# Patient Record
Sex: FEMALE | Race: WHITE | NOT HISPANIC OR LATINO | Employment: OTHER | URBAN - METROPOLITAN AREA
[De-identification: names, ages, dates, MRNs, and addresses within clinical notes are randomized per-mention and may not be internally consistent; named-entity substitution may affect disease eponyms.]

---

## 2019-11-07 ENCOUNTER — TELEPHONE (OUTPATIENT)
Dept: FAMILY MEDICINE CLINIC | Facility: CLINIC | Age: 44
End: 2019-11-07

## 2019-11-07 ENCOUNTER — APPOINTMENT (OUTPATIENT)
Dept: LAB | Facility: HOSPITAL | Age: 44
End: 2019-11-07

## 2019-11-07 ENCOUNTER — OFFICE VISIT (OUTPATIENT)
Dept: BARIATRICS/WEIGHT MGMT | Facility: CLINIC | Age: 44
End: 2019-11-07

## 2019-11-07 VITALS
TEMPERATURE: 97.8 F | HEIGHT: 64 IN | BODY MASS INDEX: 46.1 KG/M2 | SYSTOLIC BLOOD PRESSURE: 124 MMHG | WEIGHT: 270 LBS | RESPIRATION RATE: 18 BRPM | DIASTOLIC BLOOD PRESSURE: 72 MMHG | HEART RATE: 87 BPM | OXYGEN SATURATION: 99 %

## 2019-11-07 DIAGNOSIS — R10.13 DYSPEPSIA: ICD-10-CM

## 2019-11-07 DIAGNOSIS — K21.9 GASTROESOPHAGEAL REFLUX DISEASE, ESOPHAGITIS PRESENCE NOT SPECIFIED: Primary | ICD-10-CM

## 2019-11-07 DIAGNOSIS — E66.9 DIABETES MELLITUS TYPE 2 IN OBESE (HCC): ICD-10-CM

## 2019-11-07 DIAGNOSIS — R06.09 DYSPNEA ON EXERTION: ICD-10-CM

## 2019-11-07 DIAGNOSIS — R11.0 POSTPRANDIAL NAUSEA: ICD-10-CM

## 2019-11-07 DIAGNOSIS — Z87.891 FORMER SMOKER: ICD-10-CM

## 2019-11-07 DIAGNOSIS — I10 HYPERTENSION, UNSPECIFIED TYPE: ICD-10-CM

## 2019-11-07 DIAGNOSIS — R53.83 FATIGUE, UNSPECIFIED TYPE: ICD-10-CM

## 2019-11-07 DIAGNOSIS — E66.01 MORBID OBESITY (HCC): ICD-10-CM

## 2019-11-07 DIAGNOSIS — E78.1 HYPERTRIGLYCERIDEMIA: ICD-10-CM

## 2019-11-07 DIAGNOSIS — E11.69 DIABETES MELLITUS TYPE 2 IN OBESE (HCC): ICD-10-CM

## 2019-11-07 PROCEDURE — 84443 ASSAY THYROID STIM HORMONE: CPT | Performed by: PHYSICIAN ASSISTANT

## 2019-11-07 PROCEDURE — 83036 HEMOGLOBIN GLYCOSYLATED A1C: CPT | Performed by: PHYSICIAN ASSISTANT

## 2019-11-07 PROCEDURE — 36415 COLL VENOUS BLD VENIPUNCTURE: CPT | Performed by: PHYSICIAN ASSISTANT

## 2019-11-07 PROCEDURE — 80053 COMPREHEN METABOLIC PANEL: CPT | Performed by: PHYSICIAN ASSISTANT

## 2019-11-07 PROCEDURE — 80061 LIPID PANEL: CPT | Performed by: PHYSICIAN ASSISTANT

## 2019-11-07 PROCEDURE — 85025 COMPLETE CBC W/AUTO DIFF WBC: CPT | Performed by: PHYSICIAN ASSISTANT

## 2019-11-07 PROCEDURE — 99205 OFFICE O/P NEW HI 60 MIN: CPT | Performed by: PHYSICIAN ASSISTANT

## 2019-11-07 RX ORDER — BACLOFEN 10 MG/1
10 TABLET ORAL NIGHTLY PRN
COMMUNITY
Start: 2019-10-25 | End: 2020-04-01

## 2019-11-07 RX ORDER — L.RHAMNOSUS/B.ANIMALIS(LACTIS) 3B CELL
1 CAPSULE ORAL DAILY
COMMUNITY
End: 2021-12-09

## 2019-11-07 RX ORDER — OMEPRAZOLE 20 MG/1
20 CAPSULE, DELAYED RELEASE ORAL 2 TIMES DAILY
Refills: 1 | COMMUNITY
Start: 2019-09-30 | End: 2020-02-27 | Stop reason: HOSPADM

## 2019-11-07 RX ORDER — ALBUTEROL SULFATE 90 UG/1
AEROSOL, METERED RESPIRATORY (INHALATION)
COMMUNITY
Start: 2019-10-15 | End: 2021-12-09

## 2019-11-07 RX ORDER — IBUPROFEN 200 MG
200 TABLET ORAL EVERY 6 HOURS PRN
COMMUNITY
End: 2020-02-27 | Stop reason: HOSPADM

## 2019-11-07 RX ORDER — INHALER, ASSIST DEVICES
SPACER (EA) MISCELLANEOUS
Refills: 0 | COMMUNITY
Start: 2019-10-19 | End: 2020-04-01

## 2019-11-07 RX ORDER — LISINOPRIL 10 MG/1
10 TABLET ORAL DAILY
COMMUNITY
Start: 2019-10-25

## 2019-11-07 RX ORDER — FENOFIBRIC ACID 135 MG/1
135 CAPSULE, DELAYED RELEASE ORAL DAILY
COMMUNITY
Start: 2019-10-25

## 2019-11-07 NOTE — PROGRESS NOTES
[facility] GROUP BARIATRIC SURGERY  [address]  [facility] [address]  [phone]      Patient  Name:  Eloise Delatorre  :  1975      Date of Visit: 2019      Chief Complaint:  weight gain; unable to maintain weight loss    History of Present Illness:  Eloise Delatorre is a 44 y.o. female who presents today for evaluation, education and consultation regarding weight loss surgery. The patient is interested in sleeve gastrectomy with Dr. Guzman.     Eloise has been overweight for at least 17 years, has been 35 pounds or more overweight for at least 10 years, has been 100 pounds or more overweight for 10 or more years and started dieting at age 32.  Previous diet attempts include: High Protein, Herbal Life, Low Carbohydrate, Low Fat, Calorie Counting, Cee's Diet, Cabbage Soup, Fasting and Slim Fast; Nutri-System and Weight Watchers; Wellbutrin, Phenteramine and Contrave.  The most weight Eloise lost was 15 pounds on WW but was unable to maintain that weight loss.  Her maximum lifetime weight is 270 pounds - current weight.    As above, patient has been overweight for many years, with numerous failed dietary/weight loss attempts.  She now has obesity related comorbidities and as such has decided to pursue weight loss surgery.  All past medical, surgical, social and family history have been obtained and discussed today, as pertinent to bariatric surgery.     Past Medical History:   Diagnosis Date   • Anxiety     no meds, previously on Zoloft, avoids d/t weight gain   • Asthma     allergy induced, prn inhalers   • Chronic back pain     Baclofen + Ibuprofen, denies steroid injections d/t weight gain   • Dyspepsia     w/ postprandial nausea + abd.pain   • Dyspnea on exertion    • Fatigue    • Fibromyalgia    • Former smoker     quit    • GERD (gastroesophageal reflux disease)     BID PPI, denies prior eval   • Hypertension    • Hypertriglyceridemia    • Iron deficiency     in  the past   • Morbid obesity (CMS/HCC)    • Psoriasis    • Sleep apnea     suspected, denies prior testing   • Type 2 diabetes mellitus (CMS/HCC)     never on insulin, does not check FSBS, A1C 11??   • Vitamin D deficiency      Past Surgical History:   Procedure Laterality Date   • BACK SURGERY  2012    L4-L5   • BLADDER SUSPENSION  2009    during hysterectomy   • COLONOSCOPY  2015    unremarkable, but w/ hx polyps   • DIAGNOSTIC LAPAROSCOPY  1994    x 8 ([id number]) for endometriosis   • TOTAL LAPAROSCOPIC HYSTERECTOMY  2009    for endometriosis   • WISDOM TOOTH EXTRACTION  1992       No Known Allergies    Current Outpatient Medications:   •  albuterol sulfate  (90 Base) MCG/ACT inhaler, , Disp: , Rfl:   •  baclofen (LIORESAL) 10 MG tablet, Take 10 mg by mouth At Night As Needed., Disp: , Rfl:   •  Cholecalciferol (VITAMIN D3) 125 MCG (5000 UT) capsule capsule, Take 5,000 Units by mouth Daily., Disp: , Rfl:   •  fenofibric acid (TRILIPIX) 135 MG capsule delayed-release delayed release capsule, , Disp: , Rfl:   •  ibuprofen (ADVIL,MOTRIN) 200 MG tablet, Take 200 mg by mouth Every 6 (Six) Hours As Needed for Mild Pain ., Disp: , Rfl:   •  lisinopril (PRINIVIL,ZESTRIL) 10 MG tablet, Take 10 mg by mouth Daily., Disp: , Rfl:   •  Loratadine-Pseudoephedrine (CLARITIN-D 12 HOUR PO), Take  by mouth 2 (Two) Times a Day., Disp: , Rfl:   •  metFORMIN (GLUCOPHAGE) 500 MG tablet, TAKE 1 TABLET BY MOUTH EVERY DAY IN THE MORNING WITH BREAKFAST, Disp: , Rfl: 1  •  omeprazole (priLOSEC) 20 MG capsule, Take 20 mg by mouth 2 (Two) Times a Day., Disp: , Rfl: 1  •  Probiotic Product ([employer]) capsule, Take  by mouth., Disp: , Rfl:   •  Spacer/Aero-Holding Chambers (OPTICJohn [facility]. [facility] Children's Hospital[facility] RICCARDO) misc, 1 EACH BY MISC.(NON-DRUG COMBO ROUTE) ROUTE 4 TIMES DAILY., Disp: , Rfl: 0    Social History     Socioeconomic History   • Marital status:      Spouse name: Not on file   • Number of children: Not on file   • Years of  education: Not on file   • Highest education level: Not on file   Tobacco Use   • Smoking status: Former Smoker     Packs/day: 3.00     Years: 20.00     Pack years: 60.00     Last attempt to quit: 2009     Years since quitting: 10.8   • Smokeless tobacco: Never Used   Substance and Sexual Activity   • Alcohol use: No     Frequency: Never   • Drug use: No   Social History Narrative    Living in [place], [place] w/  and daughter.  On disability since 2/2019 for back pain.  Formerly worked w/ [employer] x 17 years.     Family History   Problem Relation Age of Onset   • Obesity Mother    • Hypertension Mother    • Diabetes Mother    • Obesity Father    • Hypertension Father    • Heart attack Father    • Aneurysm Father    • Obesity Maternal Grandmother    • Diabetes Maternal Grandmother    • Hypertension Maternal Grandmother    • Colon cancer Maternal Grandfather 87   • Obesity Maternal Grandfather    • Hypertension Maternal Grandfather    • Obesity Paternal Grandmother    • Hypertension Paternal Grandmother    • Obesity Paternal Grandfather    • Hypertension Paternal Grandfather        Review of Systems:  Constitutional:  reports fatigue, weight gain and denies fevers, chills.  HEENT:  denies headache, ear pain or loss of hearing, blurred or double vision, nasal discharge or sore throat.  Cardiovascular:  reports HTN, HLD and denies hx heart disease, hx MI, chest pain, palpitations, hx DVT.  Respiratory:  reports asthma, suspected sleep apnea and denies cough , wheezing, hx PE.  Gastrointestinal:  reports dysphagia, heartburn, nausea, abdominal pain, and denies liver disease.  Genitourinary:  denies history of  frequent UTI, incontinence, hematuria, dysuria, polyuria, renal insufficiency.    Musculoskeletal:  denies autoimmune disease.  Neurological:  denies migraines, numbness /tingling, dizziness, confusion, seizure.  Psychiatric:  reports anxiety, depression and denies bipolar disorder.  Endocrine:  denies  thyroid disease, gout.  Hematologic:  denies bleeding disorder, hx blood transfusion.  Skin:  denies rashes, hx MRSA.    Physical Exam:  Vital Signs:  Weight: 122 kg (270 lb)   Body mass index is 47.08 kg/m².  Temp: 97.8 °F (36.6 °C)   Heart Rate: 87   BP: 124/72     Physical Exam   Constitutional: She is oriented to person, place, and time. She appears well-developed and well-nourished.   HENT:   Head: Normocephalic and atraumatic.   Eyes: Conjunctivae are normal. No scleral icterus.   Neck: Neck supple. No thyromegaly present.   Cardiovascular: Normal rate and regular rhythm.   No murmur heard.  Pulmonary/Chest: Effort normal and breath sounds normal. No respiratory distress. She has no wheezes. She has no rales.   Abdominal: Soft. Bowel sounds are normal. She exhibits no mass. There is tenderness (RUQ). There is no rebound and no guarding. No hernia.   scars: lower lap incisions (KENDRA/BSO), periumbilical   Musculoskeletal: Normal range of motion. She exhibits no edema.   Neurological: She is alert and oriented to person, place, and time. Gait normal.   Skin: Skin is warm and dry. No rash noted.   Psychiatric: She has a normal mood and affect. Judgment normal.   Vitals reviewed.      Patient Active Problem List   Diagnosis   • Fatigue   • Dyspepsia   • Dyspnea on exertion   • Morbid obesity (CMS/HCC)   • Hypertension   • GERD (gastroesophageal reflux disease)   • Type 2 diabetes mellitus (CMS/HCC)   • Hypertriglyceridemia   • Chronic back pain   • Vitamin D deficiency   • Asthma   • Iron deficiency   • Former smoker   • Fibromyalgia   • Sleep apnea   • Anxiety   • Psoriasis       Assessment:    Eloise Delatorre is a 44 y.o. female with medically complicated obesity pursuing sleeve gastrectomy.    Weight loss surgery is deemed medically necessary given the following obesity related comorbidities including diabetes, hypertension, dyslipidemia, back pain, GERD and asthma with current Weight: 122 kg (270 lb) and Body mass  index is 47.08 kg/m².    Plan:  Patient understands that bariatric surgery is not cosmetic surgery but rather a tool to help make a lifelong commitment to lifestyle changes including diet, exercise, behavior modifications, and healthy habits.  The patient has been educated today on those expected postoperative lifestyle changes.  The surgical procedure was discussed and all questions were answered.  Instructions on how to access [employer] (an internet based site w/ educational surgical videos) were given to the patient.  Recommended vitamin supplementation was reviewed.  The importance of avoiding ASA/ NSAIDS/ steroids/ tobacco/ hormones/ immunomodulators perioperatively was discussed in detail.  Psychological and Nutritional evaluations will be arranged prior to surgery.  The patient was advised to start on a high protein and low carbohydrate diet in preparation for surgery.      Preop testing will include: CBC, CMP, Lipids, TSH, HgA1C, H.Pylori stool, Pulmonary Function Testing, CXR, EKG, Gallbladder Eval and EGD.    The risks and benefits of the upper endoscopy were discussed with the patient in detail and all questions were answered.  Possibility of perforation, bleeding, aspiration, and anesthesia reaction were reviewed.  Patient agrees to proceed.    Additional preop clearances required prior to surgery: Cardiac.      Patient is an acceptable candidate for surgery pending the above results and final consultation w/ Dr. Guzman.    AIMEE Garcia        MDM: New problem w/ further workup planned.  Labs, imaging, additional testing planned, old records (op notes) obtained /reviewed, all to be discussed further w/ surgeon.  HIGH complexity.

## 2019-11-08 LAB
ALBUMIN SERPL-MCNC: 4 G/DL (ref 3.5–5.2)
ALBUMIN/GLOB SERPL: 1.5 G/DL
ALP SERPL-CCNC: 87 U/L (ref 39–117)
ALT SERPL W P-5'-P-CCNC: 26 U/L (ref 1–33)
ANION GAP SERPL CALCULATED.3IONS-SCNC: 10.8 MMOL/L (ref 5–15)
AST SERPL-CCNC: 15 U/L (ref 1–32)
BASOPHILS # BLD AUTO: 0.05 10*3/MM3 (ref 0–0.2)
BASOPHILS NFR BLD AUTO: 0.5 % (ref 0–1.5)
BILIRUB SERPL-MCNC: <0.2 MG/DL (ref 0.2–1.2)
BUN BLD-MCNC: 15 MG/DL (ref 6–20)
BUN/CREAT SERPL: 22.4 (ref 7–25)
CALCIUM SPEC-SCNC: 9.1 MG/DL (ref 8.6–10.5)
CHLORIDE SERPL-SCNC: 107 MMOL/L (ref 98–107)
CHOLEST SERPL-MCNC: 165 MG/DL (ref 0–200)
CO2 SERPL-SCNC: 26.2 MMOL/L (ref 22–29)
CREAT BLD-MCNC: 0.67 MG/DL (ref 0.57–1)
DEPRECATED RDW RBC AUTO: 45.7 FL (ref 37–54)
EOSINOPHIL # BLD AUTO: 0.15 10*3/MM3 (ref 0–0.4)
EOSINOPHIL NFR BLD AUTO: 1.4 % (ref 0.3–6.2)
ERYTHROCYTE [DISTWIDTH] IN BLOOD BY AUTOMATED COUNT: 17.1 % (ref 12.3–15.4)
GFR SERPL CREATININE-BSD FRML MDRD: 95 ML/MIN/1.73
GLOBULIN UR ELPH-MCNC: 2.6 GM/DL
GLUCOSE BLD-MCNC: 167 MG/DL (ref 65–99)
HBA1C MFR BLD: 6.7 % (ref 4.8–5.6)
HCT VFR BLD AUTO: 36.2 % (ref 34–46.6)
HDLC SERPL-MCNC: 38 MG/DL (ref 40–60)
HGB BLD-MCNC: 10.7 G/DL (ref 12–15.9)
IMM GRANULOCYTES # BLD AUTO: 0.18 10*3/MM3 (ref 0–0.05)
IMM GRANULOCYTES NFR BLD AUTO: 1.7 % (ref 0–0.5)
LDLC SERPL CALC-MCNC: 66 MG/DL (ref 0–100)
LDLC/HDLC SERPL: 1.74 {RATIO}
LYMPHOCYTES # BLD AUTO: 2.35 10*3/MM3 (ref 0.7–3.1)
LYMPHOCYTES NFR BLD AUTO: 22.3 % (ref 19.6–45.3)
MCH RBC QN AUTO: 22.3 PG (ref 26.6–33)
MCHC RBC AUTO-ENTMCNC: 29.6 G/DL (ref 31.5–35.7)
MCV RBC AUTO: 75.6 FL (ref 79–97)
MONOCYTES # BLD AUTO: 0.95 10*3/MM3 (ref 0.1–0.9)
MONOCYTES NFR BLD AUTO: 9 % (ref 5–12)
NEUTROPHILS # BLD AUTO: 6.85 10*3/MM3 (ref 1.7–7)
NEUTROPHILS NFR BLD AUTO: 65.1 % (ref 42.7–76)
NRBC BLD AUTO-RTO: 0 /100 WBC (ref 0–0.2)
PLATELET # BLD AUTO: 302 10*3/MM3 (ref 140–450)
PMV BLD AUTO: 13.3 FL (ref 6–12)
POTASSIUM BLD-SCNC: 4.5 MMOL/L (ref 3.5–5.2)
PROT SERPL-MCNC: 6.6 G/DL (ref 6–8.5)
RBC # BLD AUTO: 4.79 10*6/MM3 (ref 3.77–5.28)
SODIUM BLD-SCNC: 144 MMOL/L (ref 136–145)
TRIGL SERPL-MCNC: 304 MG/DL (ref 0–150)
TSH SERPL DL<=0.05 MIU/L-ACNC: 1.11 UIU/ML (ref 0.27–4.2)
VLDLC SERPL-MCNC: 60.8 MG/DL (ref 5–40)
WBC NRBC COR # BLD: 10.53 10*3/MM3 (ref 3.4–10.8)

## 2019-11-12 ENCOUNTER — DOCUMENTATION (OUTPATIENT)
Dept: BARIATRICS/WEIGHT MGMT | Facility: CLINIC | Age: 44
End: 2019-11-12

## 2019-11-12 NOTE — PROGRESS NOTES
"Weight Loss Surgery  Presurgical Nutrition Assessment     Eloise Delatorre  11/12/2019 ( Nutrition consultation /c dietitian on 11/07/2019)  [id number]  [id number]  1975  female    Surgery desired: Sleeve Gastrectomy    Ht 161.3 cm (63.5\"); Wt 122 kg (270 #); BMI 47.1  Past Medical History:   Diagnosis Date   • Anxiety     no meds, previously on Zoloft, avoids d/t weight gain   • Asthma     allergy induced, prn inhalers   • Chronic back pain     Baclofen + Ibuprofen, denies steroid injections d/t weight gain   • Dyspepsia     w/ postprandial nausea + abd.pain   • Dyspnea on exertion    • Fatigue    • Fibromyalgia    • Former smoker     quit 2009   • GERD (gastroesophageal reflux disease)     BID PPI, denies prior eval   • Hypertension    • Hypertriglyceridemia    • Iron deficiency     in the past   • Morbid obesity (CMS/HCC)    • Psoriasis    • Sleep apnea     suspected, denies prior testing   • Type 2 diabetes mellitus (CMS/HCC)     never on insulin, does not check FSBS, A1C 11??   • Vitamin D deficiency      Past Surgical History:   Procedure Laterality Date   • BACK SURGERY  2012    L4-L5   • BLADDER SUSPENSION  2009    during hysterectomy   • COLONOSCOPY  2015    unremarkable, but w/ hx polyps   • DIAGNOSTIC LAPAROSCOPY  1994    x 8 ([id number]) for endometriosis   • TOTAL LAPAROSCOPIC HYSTERECTOMY  2009    for endometriosis   • WISDOM TOOTH EXTRACTION  1992     No Known Allergies    Current Outpatient Medications:   •  albuterol sulfate  (90 Base) MCG/ACT inhaler, , Disp: , Rfl:   •  baclofen (LIORESAL) 10 MG tablet, Take 10 mg by mouth At Night As Needed., Disp: , Rfl:   •  Cholecalciferol (VITAMIN D3) 125 MCG (5000 UT) capsule capsule, Take 5,000 Units by mouth Daily., Disp: , Rfl:   •  fenofibric acid (TRILIPIX) 135 MG capsule delayed-release delayed release capsule, , Disp: , Rfl:   •  ibuprofen (ADVIL,MOTRIN) 200 MG tablet, Take 200 mg by mouth Every 6 (Six) Hours As Needed for Mild Pain ., Disp: " , Rfl:   •  lisinopril (PRINIVIL,ZESTRIL) 10 MG tablet, Take 10 mg by mouth Daily., Disp: , Rfl:   •  Loratadine-Pseudoephedrine (CLARITIN-D 12 HOUR PO), Take  by mouth 2 (Two) Times a Day., Disp: , Rfl:   •  metFORMIN (GLUCOPHAGE) 500 MG tablet, TAKE 1 TABLET BY MOUTH EVERY DAY IN THE MORNING WITH BREAKFAST, Disp: , Rfl: 1  •  omeprazole (priLOSEC) 20 MG capsule, Take 20 mg by mouth 2 (Two) Times a Day., Disp: , Rfl: 1  •  Probiotic Product ([employer]) capsule, Take  by mouth., Disp: , Rfl:   •  Spacer/Aero-Holding Chambers (OPTICSt[facility] HospitalAventones) misc, 1 EACH BY MISC.(NON-DRUG COMBO ROUTE) ROUTE 4 TIMES DAILY., Disp: , Rfl: 0      Nutrition Assessment    Estimated energy needs:  1840 kcal    Estimated calories for weight loss:  1400 kcal    IBW (Pounds):  145 #        Excess body weight (Pounds):  125 #       Nutrition Recall  24 Hour recall: (B) (L) (D) -  Reviewed and discussed with patient.  Drinks water primarily - no soda or sweet tea.  Brkfast @ 8 am = 2 1/2 c salad:  Lettuce, 1/4 c shredded cheese, 20 jumbo shrimp /c no sauce & blue cheese dressing.  Dinner @ 4:30 pm = 6 slices of [employer] supreme pizza, ea piece /c ~ 1 oz cheese + 1 oz meat.  Snack @ 8 pm = reg sz Brandy bar /c almonds.  States that serving sizes & meal skipping are her primary food-related issues.  Diet adeq in total protein which is consumed in only 2 feedings. Pt to focus on ingesting adeq but not excessive prot for wt mgt in 3 meals + 2-3 snks qd.       Exercise  never      Education    Provided information packet re:  Sleeve Gastrectomy  1. Reviewed guidelines for higher protein, limited carbohydrate diet to promote weight loss.  Encouraged patient to incorporate these principles of healthy eating from now until approximately 2 weeks prior to bariatric surgery date, when an even lower carbohydrate “liver-shrinking” regimen will be followed. (Information sheet re pre-op diet given).  Explained that after recovery from  surgery this diet will again be followed to ensure further loss and for weight maintenance.    2. Encouraged patient to choose an acceptable protein supplement powder or shake for post-surgery liquid diet.  Provided product guidelines and examples.    3. Explained importance of goal setting to help in changing eating behaviors that are not conducive to weight loss.  Targeted several on a worksheet which also included spaces for patient to work on issues specific to them.  4. Provided follow-up options for support, including contact information for dietitians here, if desired.  Web-based support information and apps for smart phones and computers given.  Noted that monthly support group is offered at this clinic, and that support is associated with successful weight loss.    Recommend that team proceed with surgery and follow per protocol.      Nutrition Goals   Dietary Guidelines per information packet as described above  Protein goal:  grams per day   Carbohydrate goal:  100-140 grams per day  Eliminate soda, sweet tea, etc.     Exercise Goals  Continue current exercise routine   Add 15-30 minutes of activity per day as tolerated      Nicole Gonzalez RD  11/12/2019  10:05 AM

## 2019-11-19 ENCOUNTER — HOSPITAL ENCOUNTER (OUTPATIENT)
Dept: PULMONOLOGY | Facility: HOSPITAL | Age: 44
Discharge: HOME | End: 2019-11-19

## 2019-11-19 ENCOUNTER — LAB (OUTPATIENT)
Dept: LAB | Facility: HOSPITAL | Age: 44
End: 2019-11-19

## 2019-11-19 ENCOUNTER — HOSPITAL ENCOUNTER (OUTPATIENT)
Dept: ULTRASOUND IMAGING | Facility: HOSPITAL | Age: 44
Discharge: HOME | End: 2019-11-19
Admitting: PHYSICIAN ASSISTANT

## 2019-11-19 DIAGNOSIS — Z87.891 FORMER SMOKER: ICD-10-CM

## 2019-11-19 DIAGNOSIS — K21.9 GASTROESOPHAGEAL REFLUX DISEASE, ESOPHAGITIS PRESENCE NOT SPECIFIED: ICD-10-CM

## 2019-11-19 DIAGNOSIS — R10.13 DYSPEPSIA: ICD-10-CM

## 2019-11-19 DIAGNOSIS — R06.09 DYSPNEA ON EXERTION: ICD-10-CM

## 2019-11-19 DIAGNOSIS — E66.01 MORBID OBESITY (HCC): ICD-10-CM

## 2019-11-19 PROCEDURE — 94729 DIFFUSING CAPACITY: CPT

## 2019-11-19 PROCEDURE — 94727 GAS DIL/WSHOT DETER LNG VOL: CPT

## 2019-11-19 PROCEDURE — 94729 DIFFUSING CAPACITY: CPT | Performed by: INTERNAL MEDICINE

## 2019-11-19 PROCEDURE — 94010 BREATHING CAPACITY TEST: CPT

## 2019-11-19 PROCEDURE — 94010 BREATHING CAPACITY TEST: CPT | Performed by: INTERNAL MEDICINE

## 2019-11-19 PROCEDURE — 87338 HPYLORI STOOL AG IA: CPT

## 2019-11-19 PROCEDURE — 94727 GAS DIL/WSHOT DETER LNG VOL: CPT | Performed by: INTERNAL MEDICINE

## 2019-11-19 PROCEDURE — 76705 ECHO EXAM OF ABDOMEN: CPT

## 2019-11-22 LAB — H PYLORI AG STL QL IA: NEGATIVE

## 2019-11-27 ENCOUNTER — LAB REQUISITION (OUTPATIENT)
Dept: LAB | Facility: HOSPITAL | Age: 44
End: 2019-11-27

## 2019-11-27 DIAGNOSIS — K21.9 GASTRO-ESOPHAGEAL REFLUX DISEASE WITHOUT ESOPHAGITIS: ICD-10-CM

## 2019-11-27 PROCEDURE — 88305 TISSUE EXAM BY PATHOLOGIST: CPT | Performed by: SURGERY

## 2019-12-02 LAB
CYTO UR: NORMAL
LAB AP CASE REPORT: NORMAL
LAB AP CLINICAL INFORMATION: NORMAL
PATH REPORT.FINAL DX SPEC: NORMAL
PATH REPORT.GROSS SPEC: NORMAL

## 2019-12-10 ENCOUNTER — CONSULT (OUTPATIENT)
Dept: CARDIOLOGY | Facility: CLINIC | Age: 44
End: 2019-12-10

## 2019-12-10 VITALS
SYSTOLIC BLOOD PRESSURE: 118 MMHG | HEART RATE: 86 BPM | WEIGHT: 278 LBS | HEIGHT: 64 IN | DIASTOLIC BLOOD PRESSURE: 72 MMHG | BODY MASS INDEX: 47.46 KG/M2

## 2019-12-10 DIAGNOSIS — Z01.810 PREOPERATIVE CARDIOVASCULAR EXAMINATION: Primary | ICD-10-CM

## 2019-12-10 PROCEDURE — 99203 OFFICE O/P NEW LOW 30 MIN: CPT | Performed by: INTERNAL MEDICINE

## 2019-12-10 PROCEDURE — 93000 ELECTROCARDIOGRAM COMPLETE: CPT | Performed by: INTERNAL MEDICINE

## 2019-12-10 RX ORDER — MULTIVITAMIN/IRON/FOLIC ACID 18MG-0.4MG
1 TABLET ORAL DAILY
COMMUNITY
Start: 2019-12-01

## 2019-12-10 NOTE — PROGRESS NOTES
[facility]  Cardiology Consultation Note     Eloise Delatorre  1975  Requesting Provider: AIMEE Garcia  PCP: Onofre Butler MD    ID:  Eloise Delatorre is a 44 y.o. female seen for a consultation visit regarding the following:     Chief Complaint   Patient presents with   • Shortness of Breath   • Edema   • Surgical Clearance             The patient is a 44 y.o. female who is referred to my office by Latanya Jo to obtain cardiac clearance for bariatric surgery. She has a history of hypertension, morbid obesity, and diabetes mellitus.  The patient denies any exertional chest discomfort to suggest angina.  She does experience shortness of breath with activities which is been present since she has been obese.  She does suffer from low back pain which limits some of the activities that she can do.  However, she does participate in warm water aerobics 5 days a week without chest discomfort.  She is able to walk up a flight of stairs but typically chooses not to do so due to back pain.  She denies any family history of precocious coronary artery disease.      Past Medical History, Past Surgical History, Family history, Social History, and Medications were all reviewed with the patient today and updated as necessary.       Current Outpatient Medications:   •  albuterol sulfate  (90 Base) MCG/ACT inhaler, , Disp: , Rfl:   •  baclofen (LIORESAL) 10 MG tablet, Take 10 mg by mouth At Night As Needed., Disp: , Rfl:   •  Cholecalciferol (VITAMIN D3) 125 MCG (5000 UT) capsule capsule, Take 5,000 Units by mouth Daily., Disp: , Rfl:   •  fenofibric acid (TRILIPIX) 135 MG capsule delayed-release delayed release capsule, , Disp: , Rfl:   •  ibuprofen (ADVIL,MOTRIN) 200 MG tablet, Take 200 mg by mouth Every 6 (Six) Hours As Needed for Mild Pain ., Disp: , Rfl:   •  lisinopril (PRINIVIL,ZESTRIL) 10 MG tablet, Take 10 mg by mouth Daily., Disp: , Rfl:   •   Loratadine-Pseudoephedrine (CLARITIN-D 12 HOUR PO), Take  by mouth 2 (Two) Times a Day., Disp: , Rfl:   •  metFORMIN (GLUCOPHAGE) 500 MG tablet, TAKE 1 TABLET BY MOUTH EVERY DAY IN THE MORNING WITH BREAKFAST, Disp: , Rfl: 1  •  Multiple Vitamins-Minerals (C[employer]UM [employer] WOMENS) tablet, Take 1 tablet by mouth Daily., Disp: , Rfl:   •  omeprazole (priLOSEC) 20 MG capsule, Take 20 mg by mouth 2 (Two) Times a Day., Disp: , Rfl: 1  •  Probiotic Product ([employer]) capsule, Take  by mouth., Disp: , Rfl:   •  Spacer/Aero-Holding Chambers (OPTICUp[facility] Community CampusBER RICCARDO) misc, 1 EACH BY MISC.(NON-DRUG COMBO ROUTE) ROUTE 4 TIMES DAILY., Disp: , Rfl: 0    No Known Allergies      Past Medical History:   Diagnosis Date   • Anxiety     no meds, previously on Zoloft, avoids d/t weight gain   • Asthma     allergy induced, prn inhalers   • Chronic back pain     Baclofen + Ibuprofen, denies steroid injections d/t weight gain   • Dyspepsia     w/ postprandial nausea + abd.pain   • Dyspnea on exertion    • Fatigue    • Fibromyalgia    • Former smoker     quit 2009   • GERD (gastroesophageal reflux disease)     BID PPI, denies prior eval   • Hypertension    • Hypertriglyceridemia    • Iron deficiency     in the past   • Morbid obesity (CMS/HCC)    • Psoriasis    • Sleep apnea     suspected, denies prior testing   • Type 2 diabetes mellitus (CMS/HCC)     never on insulin, does not check FSBS, A1C 11??   • Vitamin D deficiency      Past Surgical History:   Procedure Laterality Date   • BACK SURGERY  2012    L4-L5   • BLADDER SUSPENSION  2009    during hysterectomy   • CAROTID STENT     • COLONOSCOPY  2015    unremarkable, but w/ hx polyps   • DIAGNOSTIC LAPAROSCOPY  1994    x 8 ([id number]) for endometriosis   • TOTAL LAPAROSCOPIC HYSTERECTOMY  2009    for endometriosis   • WISDOM TOOTH EXTRACTION  1992     Family History   Problem Relation Age of Onset   • Obesity Mother    • Hypertension Mother    • Diabetes Mother    • Obesity Father   "  • Hypertension Father    • Heart attack Father    • Aneurysm Father    • Obesity Maternal Grandmother    • Diabetes Maternal Grandmother    • Hypertension Maternal Grandmother    • Colon cancer Maternal Grandfather 87   • Obesity Maternal Grandfather    • Hypertension Maternal Grandfather    • Obesity Paternal Grandmother    • Hypertension Paternal Grandmother    • Obesity Paternal Grandfather    • Hypertension Paternal Grandfather      Social History     Tobacco Use   • Smoking status: Former Smoker     Packs/day: 3.00     Years: 20.00     Pack years: 60.00     Last attempt to quit: 2009     Years since quitting: 10.9   • Smokeless tobacco: Never Used   Substance Use Topics   • Alcohol use: No     Frequency: Never       Review of Systems   Constitution: Negative for fever and malaise/fatigue.   HENT: Positive for ear pain and sore throat.    Eyes: Negative for vision loss in left eye and vision loss in right eye.   Cardiovascular: Positive for chest pain, claudication, leg swelling and orthopnea. Negative for dyspnea on exertion, near-syncope, palpitations, paroxysmal nocturnal dyspnea and syncope.   Respiratory: Positive for wheezing. Negative for hemoptysis and sputum production.    Skin: Negative for rash.   Musculoskeletal: Positive for neck pain. Negative for myalgias.   Gastrointestinal: Positive for abdominal pain. Negative for vomiting.   Neurological: Positive for headaches. Negative for brief paralysis, excessive daytime sleepiness, focal weakness, numbness, paresthesias and weakness.   All other systems reviewed and are negative.              /72 (BP Location: Right arm, Patient Position: Sitting)   Pulse 86   Ht 161.3 cm (63.5\")   Wt 126 kg (278 lb)   BMI 48.47 kg/m²        Physical Exam   Constitutional: She is oriented to person, place, and time. She appears well-developed and well-nourished.   HENT:   Head: Normocephalic and atraumatic.   Eyes: Pupils are equal, round, and reactive to " light. No scleral icterus.   Neck: No JVD present. Carotid bruit is not present. No thyromegaly present.   Cardiovascular: Normal rate and regular rhythm. Exam reveals no gallop.   No murmur heard.  Pulmonary/Chest: Effort normal and breath sounds normal.   Abdominal: Soft. She exhibits no distension. There is no hepatosplenomegaly.   Musculoskeletal: She exhibits no edema.   Neurological: She is alert and oriented to person, place, and time.   Skin: Skin is warm and dry.   Psychiatric: She has a normal mood and affect. Her behavior is normal.           ECG 12 Lead  Date/Time: 12/10/2019 9:31 AM  Performed by: Maxwell Wills IV, MD  Authorized by: Maxwell Wills IV, MD   Rhythm: sinus rhythm  BPM: 86  Other findings: poor R wave progression    Clinical impression: non-specific ECG            Lab Results   Component Value Date    GLUCOSE 167 (H) 11/07/2019    BUN 15 11/07/2019    CREATININE 0.67 11/07/2019    EGFRIFNONA 95 11/07/2019    BCR 22.4 11/07/2019    K 4.5 11/07/2019    CO2 26.2 11/07/2019    CALCIUM 9.1 11/07/2019    ALBUMIN 4.00 11/07/2019    AST 15 11/07/2019    ALT 26 11/07/2019      Lab Results   Component Value Date    CHOL 165 11/07/2019    TRIG 304 (H) 11/07/2019    HDL 38 (L) 11/07/2019    LDL 66 11/07/2019      Lab Results   Component Value Date    HGBA1C 6.70 (H) 11/07/2019           Problem List Items Addressed This Visit     Preoperative cardiovascular examination - Primary    Overview     · EKG (12/10/2019): Sinus rhythm with poor R wave progression due to lead placed         Current Assessment & Plan     · Patient has acceptable exercise tolerance without anginal or heart failure symptoms  · Patient is at low risk of obstructive CAD based on age and lack of anginal symptoms  · EKG today shows poor R wave progression related to lead placement  · No indication for further cardiac testing at this time  · Proceed with surgery with acceptable cardiovascular risk                       · Proceed with bariatric surgery with acceptable cardiovascular risk  · Please call if any cardiac complications arise during hospitalization            LEANNE Wills MD FACC, [facility] [place] [facility]  Interventional and General Cardiology

## 2019-12-10 NOTE — ASSESSMENT & PLAN NOTE
· Patient has acceptable exercise tolerance without anginal or heart failure symptoms  · Patient is at low risk of obstructive CAD based on age and lack of anginal symptoms  · EKG today shows poor R wave progression related to lead placement  · No indication for further cardiac testing at this time  · Proceed with surgery with acceptable cardiovascular risk

## 2019-12-24 ENCOUNTER — HOSPITAL ENCOUNTER (OUTPATIENT)
Dept: NUCLEAR MEDICINE | Facility: HOSPITAL | Age: 44
Discharge: HOME | End: 2019-12-24

## 2019-12-24 VITALS — WEIGHT: 261 LBS | BODY MASS INDEX: 45.51 KG/M2

## 2019-12-24 DIAGNOSIS — R11.0 POSTPRANDIAL NAUSEA: ICD-10-CM

## 2019-12-24 DIAGNOSIS — R10.13 DYSPEPSIA: ICD-10-CM

## 2019-12-24 PROCEDURE — 0 TECHNETIUM TC 99M MEBROFENIN KIT: Performed by: PHYSICIAN ASSISTANT

## 2019-12-24 PROCEDURE — A9537 TC99M MEBROFENIN: HCPCS | Performed by: PHYSICIAN ASSISTANT

## 2019-12-24 PROCEDURE — 78227 HEPATOBIL SYST IMAGE W/DRUG: CPT

## 2019-12-24 PROCEDURE — 25010000002 SINCALIDE PER 5 MCG: Performed by: PHYSICIAN ASSISTANT

## 2019-12-24 RX ORDER — KIT FOR THE PREPARATION OF TECHNETIUM TC 99M MEBROFENIN 45 MG/10ML
1 INJECTION, POWDER, LYOPHILIZED, FOR SOLUTION INTRAVENOUS
Status: COMPLETED | OUTPATIENT
Start: 2019-12-24 | End: 2019-12-24

## 2019-12-24 RX ADMIN — SINCALIDE 2.4 MCG: 5 INJECTION, POWDER, LYOPHILIZED, FOR SOLUTION INTRAVENOUS at 10:25

## 2019-12-24 RX ADMIN — MEBROFENIN 1 DOSE: 45 INJECTION, POWDER, LYOPHILIZED, FOR SOLUTION INTRAVENOUS at 09:34

## 2020-01-27 DIAGNOSIS — R06.00 DYSPNEA, UNSPECIFIED TYPE: Primary | ICD-10-CM

## 2020-01-27 DIAGNOSIS — R53.83 FATIGUE, UNSPECIFIED TYPE: ICD-10-CM

## 2020-01-29 ENCOUNTER — HOSPITAL ENCOUNTER (OUTPATIENT)
Dept: GENERAL RADIOLOGY | Facility: HOSPITAL | Age: 45
Discharge: HOME | End: 2020-01-29
Admitting: PHYSICIAN ASSISTANT

## 2020-01-29 DIAGNOSIS — R53.83 FATIGUE, UNSPECIFIED TYPE: ICD-10-CM

## 2020-01-29 DIAGNOSIS — R06.00 DYSPNEA, UNSPECIFIED TYPE: ICD-10-CM

## 2020-01-29 PROCEDURE — 71046 X-RAY EXAM CHEST 2 VIEWS: CPT

## 2020-02-01 NOTE — PROGRESS NOTES
"[facility] BARIATRIC SURGERY  [address]  [address]  [facility] [id number] [phone]      Patient  Name:  Eloise Delatorre  :  1975      Date of Visit: 2/3/2020      Chief Complaint:  weight gain; unable to maintain weight loss    History of Present Illness:  Eloise Delatorre is a 44 y.o. female who presents today for evaluation, education and consultation regarding weight loss surgery.     Patient has been overweight for many years, with numerous failed dietary/weight loss attempts.  She now has obesity related comorbidities of HTN, HLD, asthma, KYLER, GERD, DM2, chronic back pain and as such has decided to pursue weight loss surgery.    Last A1C 6.7.  Intake trigs 300+.    She saw her labs results on Mychart, and she saw hypochromia on her RBCs, looked it up on internet, started taking iron.  She thinks it has increased her energy.    Has started metformin a few months ago for prediabetes.    No other changes to medical history.      No personal or family hx of VTE or clotting d/o.  No liver, lung, heart, or renal disease      Review of data:    TEO: tramadol  CBC: nl  CMP: glc 251, CO2 18.7  EGD: 19 PQ patulous hiatus/possible HH, DE reflux  HP negative  Cardiac clearance:\"acceptable cardiac risk\"  PFTs: \"No signs of obstruction.  Moderate restriction based on a decreased total lung capacity.  With a severely reduced DLCO.\"  She uses PRN inhalers, has dypsnea on exertion at baseline.  Carries dx of allergy-induced asthma.    EKG: NSR  CXR: nl    Gallbladder eval: GBUS showed fatty liver.  HIDA EF 48% with reproduction of symptoms of mild nausea lasting only a few seconds.    Last tobacco: 10 years ago  Last NSAIDs: last week ibuprofen  Last ASA: n/a  Last steroids: remote  Last hormones: n/a      Past Medical History:   Diagnosis Date   • Anxiety     no meds, previously on Zoloft, avoids d/t weight gain   • Asthma     allergy induced, prn inhalers   • Chronic back pain     " Baclofen + Ibuprofen, denies steroid injections d/t weight gain   • Dyspepsia     w/ postprandial nausea + abd.pain   • Dyspnea on exertion    • Fatigue    • Fibromyalgia    • Former smoker     quit 2009   • GERD (gastroesophageal reflux disease)     BID PPI, denies prior eval   • Hypertension    • Hypertriglyceridemia    • Iron deficiency     in the past   • Morbid obesity (CMS/HCC)    • Prediabetes    • Psoriasis    • Sleep apnea     suspected, denies prior testing   • Type 2 diabetes mellitus (CMS/HCC)     never on insulin, does not check FSBS, A1C 11??   • Vitamin D deficiency      Past Surgical History:   Procedure Laterality Date   • BACK SURGERY  2012    L4-L5   • BLADDER SUSPENSION  2009    during hysterectomy   • COLONOSCOPY  2015    unremarkable, but w/ hx polyps   • DIAGNOSTIC LAPAROSCOPY  1994    x 8 ([id number]) for endometriosis   • TOTAL LAPAROSCOPIC HYSTERECTOMY  2009    for endometriosis   • WISDOM TOOTH EXTRACTION  1992       No Known Allergies    Current Outpatient Medications:   •  albuterol sulfate  (90 Base) MCG/ACT inhaler, , Disp: , Rfl:   •  baclofen (LIORESAL) 10 MG tablet, Take 10 mg by mouth At Night As Needed., Disp: , Rfl:   •  Cholecalciferol (VITAMIN D3) 125 MCG (5000 UT) capsule capsule, Take 5,000 Units by mouth Daily., Disp: , Rfl:   •  fenofibric acid (TRILIPIX) 135 MG capsule delayed-release delayed release capsule, , Disp: , Rfl:   •  ibuprofen (ADVIL,MOTRIN) 200 MG tablet, Take 200 mg by mouth Every 6 (Six) Hours As Needed for Mild Pain ., Disp: , Rfl:   •  IRON PO, Take  by mouth., Disp: , Rfl:   •  lisinopril (PRINIVIL,ZESTRIL) 10 MG tablet, Take 10 mg by mouth Daily., Disp: , Rfl:   •  Loratadine-Pseudoephedrine (CLARITIN-D 12 HOUR PO), Take  by mouth 2 (Two) Times a Day., Disp: , Rfl:   •  metFORMIN (GLUCOPHAGE) 500 MG tablet, TAKE 1 TABLET BY MOUTH EVERY DAY IN THE MORNING WITH BREAKFAST, Disp: , Rfl: 1  •  Multiple Vitamins-Minerals (CENTRUM ULTRA WOMENS) tablet,  Take 1 tablet by mouth Daily., Disp: , Rfl:   •  omeprazole (priLOSEC) 20 MG capsule, Take 20 mg by mouth 2 (Two) Times a Day., Disp: , Rfl: 1  •  Probiotic Product ([employer]) capsule, Take  by mouth., Disp: , Rfl:   •  Spacer/Aero-Holding Chambers (OPTICHAMBER RICCARDO) misc, 1 EACH BY MISC.(NON-DRUG COMBO ROUTE) ROUTE 4 TIMES DAILY., Disp: , Rfl: 0    Social History     Socioeconomic History   • Marital status:      Spouse name: Not on file   • Number of children: Not on file   • Years of education: Not on file   • Highest education level: Not on file   Tobacco Use   • Smoking status: Former Smoker     Packs/day: 3.00     Years: 20.00     Pack years: 60.00     Last attempt to quit: 2009     Years since quittin.0   • Smokeless tobacco: Never Used   Substance and Sexual Activity   • Alcohol use: No     Frequency: Never   • Drug use: No   • Sexual activity: Not Currently     Partners: Male     Birth control/protection: None     Comment: Hysterectomy   Social History Narrative    Living in [place], [place] w/  and daughter.  On disability since 2019 for back pain.  Formerly worked w/ [employer] x 17 years.     Family History   Problem Relation Age of Onset   • Obesity Mother    • Hypertension Mother    • Diabetes Mother    • Obesity Father    • Hypertension Father    • Heart attack Father    • Aneurysm Father    • Obesity Maternal Grandmother    • Diabetes Maternal Grandmother    • Hypertension Maternal Grandmother    • Colon cancer Maternal Grandfather 87   • Obesity Maternal Grandfather    • Hypertension Maternal Grandfather    • Obesity Paternal Grandmother    • Hypertension Paternal Grandmother    • Obesity Paternal Grandfather    • Hypertension Paternal Grandfather        Review of Systems   Constitutional: Positive for fatigue and unexpected weight gain. Negative for chills, diaphoresis, fever and unexpected weight loss.   HENT: Negative for congestion and facial swelling.     Eyes: Negative for blurred vision, double vision and discharge.   Respiratory: Negative for chest tightness, shortness of breath and stridor.    Cardiovascular: Negative for chest pain, palpitations and leg swelling.   Gastrointestinal: Negative for blood in stool.   Endocrine: Negative for polydipsia.   Genitourinary: Negative for hematuria.   Musculoskeletal: Positive for arthralgias.   Skin: Negative for color change.   Allergic/Immunologic: Negative for immunocompromised state.   Neurological: Negative for confusion.   Psychiatric/Behavioral: Negative for self-injury.       Physical Exam:  Vital Signs:  Weight: 127 kg (281 lb)   Body mass index is 49 kg/m².  Temp: 96.4 °F (35.8 °C)   Heart Rate: 89   BP: 120/78     Physical Exam   Constitutional: She is oriented to person, place, and time. She appears well-developed and well-nourished. No distress.   HENT:   Head: Normocephalic and atraumatic.   Mouth/Throat: No oropharyngeal exudate.   Eyes: Pupils are equal, round, and reactive to light. Conjunctivae and EOM are normal.   Cardiovascular: Normal rate, regular rhythm and normal heart sounds.   Pulmonary/Chest: Effort normal and breath sounds normal. No respiratory distress.   Abdominal: Soft. She exhibits no distension.   Carries most of weight on abdomen.  Scattered lap scars.     Neurological: She is alert and oriented to person, place, and time. No cranial nerve deficit.   Skin: Skin is warm and dry. She is not diaphoretic. No pallor.   Psychiatric: She has a normal mood and affect. Her behavior is normal. Thought content normal.       Patient Active Problem List   Diagnosis   • Fatigue   • Dyspepsia   • Dyspnea on exertion   • Morbid obesity (CMS/HCC)   • Hypertension   • GERD (gastroesophageal reflux disease)   • Type 2 diabetes mellitus (CMS/HCC)   • Hypertriglyceridemia   • Chronic back pain   • Vitamin D deficiency   • Asthma   • Iron deficiency   • Former smoker   • Fibromyalgia   • Sleep apnea   •  "Anxiety   • Psoriasis   • Preoperative cardiovascular examination       Assessment:    Eloise Delatorre is a 44 y.o. year old female with medically complicated obesity.    Weight loss surgery is deemed medically necessary given the following obesity related comorbidities including HTN, HLD, asthma, KYLER, GERD, DM2, chronic back pain with current Weight: 127 kg (281 lb) and Body mass index is 49 kg/m²..    Patient is aware that surgery is a tool, and that weight loss is not guaranteed but only seen in the context of appropriate use, follow up and exercise.    The patient was present for an approximately a 2.5 hour discussion of the purpose of weight loss surgery, how WLS is a tool to assist in achieving weight loss goals, the most common complications and how best to avoid them, and the strategies for short and long term weight loss.  Ample opportunity to discuss questions was available both in group and during the time of individual examination.    I reviewed all available preop labs, Xrays, tests, clearances, etc and signed off on these in the record.  All of this in addition to the patient's unique history and exam has been taken into consideration in determining their appropriate candidacy for weight loss surgery.    Complications  of laparoscopic/possible robotic gastric sleeve were discussed. The patient is well aware of the potential complications of surgery that include but not limited to bleeding, infections, deep venous thrombosis, pulmonary embolism, pulmonary complications such as pneumonia, cardiac events, hernias, small bowel obstruction, damage to the spleen or other organs, bowel injury, disfiguring scars, failure to lose weight, need for additional surgery, conversion to an open procedure, and death. Patient is also aware of complications which apply in this particular procedure that can include but are not limited to a \"leak\" at the staple line which in some instances may require conversion to gastric " bypass.    The patient is aware if a hiatal hernia is encountered, it likely will be repaired.  R/B/A Rx to hiatal hernia repair were discussed as outlined in our long consent form.  Briefly risks in addition to those for LSG include recurrent hernia, NISSA, dysphagia, esophageal injury, pneumothorax, injury to the vagus nerves, injury to the thoracic duct, aorta or vena cava.    Greater than 3 minutes was spent with the patient discussing avoiding all tobacco products and second hand smoke at least 2 weeks pre-operatively and 6 weeks post-operatively to minimize the risk of sleeve leak.  This included discussing the importance of avoiding even secondhand smoke as the risk of leak is increased.  Examples discussed:  I made it very clear that the patient understands they should avoid even riding in a car where someone has previously smoked in the last 2 weeks, living in a house where someone smokes (even if it's in a separate room/patio/attached garage, etc.) we discussed that they should not have a conversation with a group of people who are smoking even if it's outside.  They can be around wood burning fires and barbecue.  I told them I do not know if marijuana has a same effects but my overall recommendation is to avoid it for 2 weeks prior in 6 weeks after surgery.  They also are aware that nicotine may also increase the risk of leak and I strongly encouraged him to avoid that as well for 2 weeks prior in 6 weeks after surgery.    Discussed the risks, benefits and alternative therapies at great length as outlined in our extensive consent forms, consent videos, and educational teaching process under the direction of the center's .    A copy of the patient's signed informed consent is on file.    Plan:  Laparoscopic sleeve gastrectomy + possible HHR at [facility].  We discussed her equivocal HIDA results (barely had nausea, normal EF).  If nausea post op that is prolonged, would consider  cholecystectomy then.  Pt does not desire cholecystectomy now.      R/B/A Rx discussed to postop anticoagulation incl but not limited to bleeding, drug reaction, venothromboembolic events, etc. and she declined.    MDM high:  Elective procedure with the following risk factors: morbid obesity, KYLER, asthma, DM2, HTN, unfavorable body habitus.  4+ chronic medical problems reviewed.      Lila Guzman MD

## 2020-02-03 ENCOUNTER — CONSULT (OUTPATIENT)
Dept: BARIATRICS/WEIGHT MGMT | Facility: CLINIC | Age: 45
End: 2020-02-03

## 2020-02-03 VITALS
OXYGEN SATURATION: 99 % | SYSTOLIC BLOOD PRESSURE: 120 MMHG | BODY MASS INDEX: 47.97 KG/M2 | WEIGHT: 281 LBS | RESPIRATION RATE: 18 BRPM | HEART RATE: 89 BPM | DIASTOLIC BLOOD PRESSURE: 78 MMHG | HEIGHT: 64 IN | TEMPERATURE: 96.4 F

## 2020-02-03 DIAGNOSIS — G47.33 OBSTRUCTIVE SLEEP APNEA SYNDROME: ICD-10-CM

## 2020-02-03 DIAGNOSIS — R53.83 FATIGUE, UNSPECIFIED TYPE: Primary | ICD-10-CM

## 2020-02-03 DIAGNOSIS — E78.1 HYPERTRIGLYCERIDEMIA: ICD-10-CM

## 2020-02-03 DIAGNOSIS — E66.01 OBESITY, CLASS III, BMI 40-49.9 (MORBID OBESITY) (HCC): ICD-10-CM

## 2020-02-03 DIAGNOSIS — K21.9 GASTROESOPHAGEAL REFLUX DISEASE, ESOPHAGITIS PRESENCE NOT SPECIFIED: ICD-10-CM

## 2020-02-03 DIAGNOSIS — M54.9 BACK PAIN, UNSPECIFIED BACK LOCATION, UNSPECIFIED BACK PAIN LATERALITY, UNSPECIFIED CHRONICITY: ICD-10-CM

## 2020-02-03 DIAGNOSIS — J45.909 ASTHMA, UNSPECIFIED ASTHMA SEVERITY, UNSPECIFIED WHETHER COMPLICATED, UNSPECIFIED WHETHER PERSISTENT: ICD-10-CM

## 2020-02-03 DIAGNOSIS — Z87.891 FORMER SMOKER: ICD-10-CM

## 2020-02-03 DIAGNOSIS — E66.9 DIABETES MELLITUS TYPE 2 IN OBESE (HCC): ICD-10-CM

## 2020-02-03 DIAGNOSIS — E11.69 DIABETES MELLITUS TYPE 2 IN OBESE (HCC): ICD-10-CM

## 2020-02-03 DIAGNOSIS — I10 HYPERTENSION, UNSPECIFIED TYPE: ICD-10-CM

## 2020-02-03 DIAGNOSIS — K44.9 HIATAL HERNIA: ICD-10-CM

## 2020-02-03 PROCEDURE — 99215 OFFICE O/P EST HI 40 MIN: CPT | Performed by: SURGERY

## 2020-02-03 RX ORDER — GABAPENTIN 100 MG/1
600 CAPSULE ORAL ONCE
Status: CANCELLED | OUTPATIENT
Start: 2020-02-03 | End: 2020-02-03

## 2020-02-03 RX ORDER — SODIUM CHLORIDE 9 MG/ML
150 INJECTION, SOLUTION INTRAVENOUS CONTINUOUS
Status: CANCELLED | OUTPATIENT
Start: 2020-02-03

## 2020-02-03 RX ORDER — SCOLOPAMINE TRANSDERMAL SYSTEM 1 MG/1
1 PATCH, EXTENDED RELEASE TRANSDERMAL ONCE
Status: CANCELLED | OUTPATIENT
Start: 2020-02-03 | End: 2020-02-03

## 2020-02-03 RX ORDER — ACETAMINOPHEN 500 MG
1000 TABLET ORAL ONCE
Status: CANCELLED | OUTPATIENT
Start: 2020-02-03 | End: 2020-02-03

## 2020-02-03 RX ORDER — CHLORHEXIDINE GLUCONATE 0.12 MG/ML
15 RINSE ORAL
Status: CANCELLED | OUTPATIENT
Start: 2020-02-03 | End: 2020-02-03

## 2020-02-03 RX ORDER — PANTOPRAZOLE SODIUM 40 MG/10ML
40 INJECTION, POWDER, LYOPHILIZED, FOR SOLUTION INTRAVENOUS ONCE
Status: CANCELLED | OUTPATIENT
Start: 2020-02-03 | End: 2020-02-03

## 2020-02-03 NOTE — H&P
"[facility] BARIATRIC SURGERY  [address]  [address]  [facility] [id number] [phone]      Patient  Name:  Eloise Delatorre  :  1975      Date of Visit: 2/3/2020      Chief Complaint:  weight gain; unable to maintain weight loss    History of Present Illness:  Eloise Delatorre is a 44 y.o. female who presents today for evaluation, education and consultation regarding weight loss surgery.     Patient has been overweight for many years, with numerous failed dietary/weight loss attempts.  She now has obesity related comorbidities of HTN, HLD, asthma, KYLER, GERD, DM2, chronic back pain and as such has decided to pursue weight loss surgery.    Last A1C 6.7.  Intake trigs 300+.    She saw her labs results on Mychart, and she saw hypochromia on her RBCs, looked it up on internet, started taking iron.  She thinks it has increased her energy.    Has started metformin a few months ago for prediabetes.    No other changes to medical history.      No personal or family hx of VTE or clotting d/o.  No liver, lung, heart, or renal disease      Review of data:    TEO: tramadol  CBC: nl  CMP: glc 251, CO2 18.7  EGD: 19 PQ patulous hiatus/possible HH, DE reflux  HP negative  Cardiac clearance:\"acceptable cardiac risk\"  PFTs: \"No signs of obstruction.  Moderate restriction based on a decreased total lung capacity.  With a severely reduced DLCO.\"  She uses PRN inhalers, has dypsnea on exertion at baseline.  Carries dx of allergy-induced asthma.    EKG: NSR  CXR: nl    Gallbladder eval: GBUS showed fatty liver.  HIDA EF 48% with reproduction of symptoms of mild nausea lasting only a few seconds.    Last tobacco: 10 years ago  Last NSAIDs: last week ibuprofen  Last ASA: n/a  Last steroids: remote  Last hormones: n/a      Past Medical History:   Diagnosis Date   • Anxiety     no meds, previously on Zoloft, avoids d/t weight gain   • Asthma     allergy induced, prn inhalers   • Chronic back pain     " Baclofen + Ibuprofen, denies steroid injections d/t weight gain   • Dyspepsia     w/ postprandial nausea + abd.pain   • Dyspnea on exertion    • Fatigue    • Fibromyalgia    • Former smoker     quit 2009   • GERD (gastroesophageal reflux disease)     BID PPI, denies prior eval   • Hypertension    • Hypertriglyceridemia    • Iron deficiency     in the past   • Morbid obesity (CMS/HCC)    • Prediabetes    • Psoriasis    • Sleep apnea     suspected, denies prior testing   • Type 2 diabetes mellitus (CMS/HCC)     never on insulin, does not check FSBS, A1C 11??   • Vitamin D deficiency      Past Surgical History:   Procedure Laterality Date   • BACK SURGERY  2012    L4-L5   • BLADDER SUSPENSION  2009    during hysterectomy   • COLONOSCOPY  2015    unremarkable, but w/ hx polyps   • DIAGNOSTIC LAPAROSCOPY  1994    x 8 ([id number]) for endometriosis   • TOTAL LAPAROSCOPIC HYSTERECTOMY  2009    for endometriosis   • WISDOM TOOTH EXTRACTION  1992       No Known Allergies    Current Outpatient Medications:   •  albuterol sulfate  (90 Base) MCG/ACT inhaler, , Disp: , Rfl:   •  baclofen (LIORESAL) 10 MG tablet, Take 10 mg by mouth At Night As Needed., Disp: , Rfl:   •  Cholecalciferol (VITAMIN D3) 125 MCG (5000 UT) capsule capsule, Take 5,000 Units by mouth Daily., Disp: , Rfl:   •  fenofibric acid (TRILIPIX) 135 MG capsule delayed-release delayed release capsule, , Disp: , Rfl:   •  ibuprofen (ADVIL,MOTRIN) 200 MG tablet, Take 200 mg by mouth Every 6 (Six) Hours As Needed for Mild Pain ., Disp: , Rfl:   •  IRON PO, Take  by mouth., Disp: , Rfl:   •  lisinopril (PRINIVIL,ZESTRIL) 10 MG tablet, Take 10 mg by mouth Daily., Disp: , Rfl:   •  Loratadine-Pseudoephedrine (CLARITIN-D 12 HOUR PO), Take  by mouth 2 (Two) Times a Day., Disp: , Rfl:   •  metFORMIN (GLUCOPHAGE) 500 MG tablet, TAKE 1 TABLET BY MOUTH EVERY DAY IN THE MORNING WITH BREAKFAST, Disp: , Rfl: 1  •  Multiple Vitamins-Minerals (CENTRUM ULTRA WOMENS) tablet,  Take 1 tablet by mouth Daily., Disp: , Rfl:   •  omeprazole (priLOSEC) 20 MG capsule, Take 20 mg by mouth 2 (Two) Times a Day., Disp: , Rfl: 1  •  Probiotic Product (Performance Indicator) capsule, Take  by mouth., Disp: , Rfl:   •  Spacer/Aero-Holding Chambers (OPTICHAMBER RICCARDO) misc, 1 EACH BY MISC.(NON-DRUG COMBO ROUTE) ROUTE 4 TIMES DAILY., Disp: , Rfl: 0    Social History     Socioeconomic History   • Marital status:      Spouse name: Not on file   • Number of children: Not on file   • Years of education: Not on file   • Highest education level: Not on file   Tobacco Use   • Smoking status: Former Smoker     Packs/day: 3.00     Years: 20.00     Pack years: 60.00     Last attempt to quit: 2009     Years since quittin.0   • Smokeless tobacco: Never Used   Substance and Sexual Activity   • Alcohol use: No     Frequency: Never   • Drug use: No   • Sexual activity: Not Currently     Partners: Male     Birth control/protection: None     Comment: Hysterectomy   Social History Narrative    Living in [place], [place] w/  and daughter.  On disability since 2019 for back pain.  Formerly worked w/ [employer] x 17 years.     Family History   Problem Relation Age of Onset   • Obesity Mother    • Hypertension Mother    • Diabetes Mother    • Obesity Father    • Hypertension Father    • Heart attack Father    • Aneurysm Father    • Obesity Maternal Grandmother    • Diabetes Maternal Grandmother    • Hypertension Maternal Grandmother    • Colon cancer Maternal Grandfather 87   • Obesity Maternal Grandfather    • Hypertension Maternal Grandfather    • Obesity Paternal Grandmother    • Hypertension Paternal Grandmother    • Obesity Paternal Grandfather    • Hypertension Paternal Grandfather        Review of Systems   Constitutional: Positive for fatigue and unexpected weight gain. Negative for chills, diaphoresis, fever and unexpected weight loss.   HENT: Negative for congestion and facial swelling.     Eyes: Negative for blurred vision, double vision and discharge.   Respiratory: Negative for chest tightness, shortness of breath and stridor.    Cardiovascular: Negative for chest pain, palpitations and leg swelling.   Gastrointestinal: Negative for blood in stool.   Endocrine: Negative for polydipsia.   Genitourinary: Negative for hematuria.   Musculoskeletal: Positive for arthralgias.   Skin: Negative for color change.   Allergic/Immunologic: Negative for immunocompromised state.   Neurological: Negative for confusion.   Psychiatric/Behavioral: Negative for self-injury.       Physical Exam:  Vital Signs:  Weight: 127 kg (281 lb)   Body mass index is 49 kg/m².  Temp: 96.4 °F (35.8 °C)   Heart Rate: 89   BP: 120/78     Physical Exam   Constitutional: She is oriented to person, place, and time. She appears well-developed and well-nourished. No distress.   HENT:   Head: Normocephalic and atraumatic.   Mouth/Throat: No oropharyngeal exudate.   Eyes: Pupils are equal, round, and reactive to light. Conjunctivae and EOM are normal.   Cardiovascular: Normal rate, regular rhythm and normal heart sounds.   Pulmonary/Chest: Effort normal and breath sounds normal. No respiratory distress.   Abdominal: Soft. She exhibits no distension.   Carries most of weight on abdomen.  Scattered lap scars.     Neurological: She is alert and oriented to person, place, and time. No cranial nerve deficit.   Skin: Skin is warm and dry. She is not diaphoretic. No pallor.   Psychiatric: She has a normal mood and affect. Her behavior is normal. Thought content normal.       Patient Active Problem List   Diagnosis   • Fatigue   • Dyspepsia   • Dyspnea on exertion   • Morbid obesity (CMS/HCC)   • Hypertension   • GERD (gastroesophageal reflux disease)   • Type 2 diabetes mellitus (CMS/HCC)   • Hypertriglyceridemia   • Chronic back pain   • Vitamin D deficiency   • Asthma   • Iron deficiency   • Former smoker   • Fibromyalgia   • Sleep apnea   •  "Anxiety   • Psoriasis   • Preoperative cardiovascular examination       Assessment:    Eloise Delatorre is a 44 y.o. year old female with medically complicated obesity.    Weight loss surgery is deemed medically necessary given the following obesity related comorbidities including HTN, HLD, asthma, KYLER, GERD, DM2, chronic back pain with current Weight: 127 kg (281 lb) and Body mass index is 49 kg/m²..    Patient is aware that surgery is a tool, and that weight loss is not guaranteed but only seen in the context of appropriate use, follow up and exercise.    The patient was present for an approximately a 2.5 hour discussion of the purpose of weight loss surgery, how WLS is a tool to assist in achieving weight loss goals, the most common complications and how best to avoid them, and the strategies for short and long term weight loss.  Ample opportunity to discuss questions was available both in group and during the time of individual examination.    I reviewed all available preop labs, Xrays, tests, clearances, etc and signed off on these in the record.  All of this in addition to the patient's unique history and exam has been taken into consideration in determining their appropriate candidacy for weight loss surgery.    Complications  of laparoscopic/possible robotic gastric sleeve were discussed. The patient is well aware of the potential complications of surgery that include but not limited to bleeding, infections, deep venous thrombosis, pulmonary embolism, pulmonary complications such as pneumonia, cardiac events, hernias, small bowel obstruction, damage to the spleen or other organs, bowel injury, disfiguring scars, failure to lose weight, need for additional surgery, conversion to an open procedure, and death. Patient is also aware of complications which apply in this particular procedure that can include but are not limited to a \"leak\" at the staple line which in some instances may require conversion to gastric " bypass.    The patient is aware if a hiatal hernia is encountered, it likely will be repaired.  R/B/A Rx to hiatal hernia repair were discussed as outlined in our long consent form.  Briefly risks in addition to those for LSG include recurrent hernia, NISSA, dysphagia, esophageal injury, pneumothorax, injury to the vagus nerves, injury to the thoracic duct, aorta or vena cava.    Greater than 3 minutes was spent with the patient discussing avoiding all tobacco products and second hand smoke at least 2 weeks pre-operatively and 6 weeks post-operatively to minimize the risk of sleeve leak.  This included discussing the importance of avoiding even secondhand smoke as the risk of leak is increased.  Examples discussed:  I made it very clear that the patient understands they should avoid even riding in a car where someone has previously smoked in the last 2 weeks, living in a house where someone smokes (even if it's in a separate room/patio/attached garage, etc.) we discussed that they should not have a conversation with a group of people who are smoking even if it's outside.  They can be around wood burning fires and barbecue.  I told them I do not know if marijuana has a same effects but my overall recommendation is to avoid it for 2 weeks prior in 6 weeks after surgery.  They also are aware that nicotine may also increase the risk of leak and I strongly encouraged him to avoid that as well for 2 weeks prior in 6 weeks after surgery.    Discussed the risks, benefits and alternative therapies at great length as outlined in our extensive consent forms, consent videos, and educational teaching process under the direction of the center's .    A copy of the patient's signed informed consent is on file.    Plan:  Laparoscopic sleeve gastrectomy + possible HHR at [facility].  We discussed her equivocal HIDA results (barely had nausea, normal EF).  If nausea post op that is prolonged, would consider  cholecystectomy then.  Pt does not desire cholecystectomy now.      R/B/A Rx discussed to postop anticoagulation incl but not limited to bleeding, drug reaction, venothromboembolic events, etc. and she declined.    MDM high:  Elective procedure with the following risk factors: morbid obesity, KYLER, asthma, DM2, HTN, unfavorable body habitus.  4+ chronic medical problems reviewed.      Lila Guzman MD

## 2020-02-04 PROBLEM — E66.813 OBESITY, CLASS III, BMI 40-49.9 (MORBID OBESITY): Status: ACTIVE | Noted: 2020-02-04

## 2020-02-04 PROBLEM — E66.01 OBESITY, CLASS III, BMI 40-49.9 (MORBID OBESITY): Status: ACTIVE | Noted: 2020-02-04

## 2020-02-04 PROBLEM — K44.9 HIATAL HERNIA: Status: ACTIVE | Noted: 2020-02-04

## 2020-02-24 ENCOUNTER — APPOINTMENT (OUTPATIENT)
Dept: PREADMISSION TESTING | Facility: HOSPITAL | Age: 45
End: 2020-02-24

## 2020-02-24 LAB
DEPRECATED RDW RBC AUTO: 57.3 FL (ref 37–54)
ERYTHROCYTE [DISTWIDTH] IN BLOOD BY AUTOMATED COUNT: 20.8 % (ref 12.3–15.4)
HBA1C MFR BLD: 6.8 % (ref 4.8–5.6)
HCT VFR BLD AUTO: 43.7 % (ref 34–46.6)
HGB BLD-MCNC: 13 G/DL (ref 12–15.9)
MCH RBC QN AUTO: 23.5 PG (ref 26.6–33)
MCHC RBC AUTO-ENTMCNC: 29.7 G/DL (ref 31.5–35.7)
MCV RBC AUTO: 79 FL (ref 79–97)
PLATELET # BLD AUTO: 255 10*3/MM3 (ref 140–450)
PMV BLD AUTO: 11.8 FL (ref 6–12)
POTASSIUM BLD-SCNC: 4.4 MMOL/L (ref 3.5–5.2)
RBC # BLD AUTO: 5.53 10*6/MM3 (ref 3.77–5.28)
WBC NRBC COR # BLD: 9.47 10*3/MM3 (ref 3.4–10.8)

## 2020-02-24 PROCEDURE — 83036 HEMOGLOBIN GLYCOSYLATED A1C: CPT | Performed by: SURGERY

## 2020-02-24 PROCEDURE — 36415 COLL VENOUS BLD VENIPUNCTURE: CPT

## 2020-02-24 PROCEDURE — 85027 COMPLETE CBC AUTOMATED: CPT | Performed by: SURGERY

## 2020-02-24 PROCEDURE — 84132 ASSAY OF SERUM POTASSIUM: CPT | Performed by: SURGERY

## 2020-02-24 NOTE — PAT
Per Anesthesia Request, patient instructed not to take their ACE/ARB medications on the AM of surgery.    EKG AND CARDIAC CLEARANCE IN CHART FROM 12/10/2019.    CXRAY IN CHART FROM 1/30/2020.    PATIENT REPORTS WAKING UP THIS MORNING WITH BURNING DURING URINATION, FREQUENCY, AND URGENCY. DR DUPREE'S CLINICAL STAFF (PRESTON GAFFNEY) NOTIFIED. PER PRESTON THE PROVIDER WANTS PATIENT TO SEE PCP FOR UA. PATIENT AWARE. PATIENT INSTURCTED TO CALL DR DUPREE'S OFFICE WITH RESULTS.

## 2020-02-25 ENCOUNTER — HOSPITAL ENCOUNTER (INPATIENT)
Facility: HOSPITAL | Age: 45
LOS: 2 days | Discharge: HOME | End: 2020-02-27
Attending: SURGERY | Admitting: SURGERY

## 2020-02-25 ENCOUNTER — ANESTHESIA EVENT (OUTPATIENT)
Dept: PERIOP | Facility: HOSPITAL | Age: 45
End: 2020-02-25

## 2020-02-25 ENCOUNTER — ANESTHESIA (OUTPATIENT)
Dept: PERIOP | Facility: HOSPITAL | Age: 45
End: 2020-02-25

## 2020-02-25 DIAGNOSIS — K44.9 HIATAL HERNIA: ICD-10-CM

## 2020-02-25 DIAGNOSIS — E66.01 OBESITY, CLASS III, BMI 40-49.9 (MORBID OBESITY) (HCC): ICD-10-CM

## 2020-02-25 LAB
BACTERIA UR QL AUTO: ABNORMAL /HPF
BILIRUB UR QL STRIP: NEGATIVE
CLARITY UR: ABNORMAL
COLOR UR: YELLOW
GLUCOSE BLDC GLUCOMTR-MCNC: 106 MG/DL (ref 70–130)
GLUCOSE BLDC GLUCOMTR-MCNC: 129 MG/DL (ref 70–130)
GLUCOSE BLDC GLUCOMTR-MCNC: 169 MG/DL (ref 70–130)
GLUCOSE BLDC GLUCOMTR-MCNC: 204 MG/DL (ref 70–130)
GLUCOSE UR STRIP-MCNC: NEGATIVE MG/DL
HGB UR QL STRIP.AUTO: NEGATIVE
HYALINE CASTS UR QL AUTO: ABNORMAL /LPF
KETONES UR QL STRIP: NEGATIVE
LEUKOCYTE ESTERASE UR QL STRIP.AUTO: ABNORMAL
NITRITE UR QL STRIP: NEGATIVE
PH UR STRIP.AUTO: 8 [PH] (ref 5–8)
PROT UR QL STRIP: NEGATIVE
RBC # UR: ABNORMAL /HPF
REF LAB TEST METHOD: ABNORMAL
SP GR UR STRIP: 1.02 (ref 1–1.03)
SQUAMOUS #/AREA URNS HPF: ABNORMAL /HPF
UROBILINOGEN UR QL STRIP: ABNORMAL
WBC UR QL AUTO: ABNORMAL /HPF

## 2020-02-25 PROCEDURE — 63710000001 INSULIN LISPRO (HUMAN) PER 5 UNITS

## 2020-02-25 PROCEDURE — 25010000002 PHENYLEPHRINE PER 1 ML: Performed by: NURSE ANESTHETIST, CERTIFIED REGISTERED

## 2020-02-25 PROCEDURE — 47001 NDL BIOPSY LVR TM OTH MAJ PX: CPT | Performed by: SURGERY

## 2020-02-25 PROCEDURE — 25010000003 CEFAZOLIN IN DEXTROSE 2-4 GM/100ML-% SOLUTION: Performed by: SURGERY

## 2020-02-25 PROCEDURE — 88307 TISSUE EXAM BY PATHOLOGIST: CPT | Performed by: SURGERY

## 2020-02-25 PROCEDURE — 25010000002 ONDANSETRON PER 1 MG: Performed by: NURSE ANESTHETIST, CERTIFIED REGISTERED

## 2020-02-25 PROCEDURE — 25010000002 HYDROMORPHONE PER 4 MG: Performed by: NURSE ANESTHETIST, CERTIFIED REGISTERED

## 2020-02-25 PROCEDURE — 25010000002 PROPOFOL 10 MG/ML EMULSION: Performed by: NURSE ANESTHETIST, CERTIFIED REGISTERED

## 2020-02-25 PROCEDURE — 25010000002 BUPRENORPHINE PER 0.1 MG: Performed by: ANESTHESIOLOGY

## 2020-02-25 PROCEDURE — 25010000002 ENOXAPARIN PER 10 MG: Performed by: SURGERY

## 2020-02-25 PROCEDURE — 87186 SC STD MICRODIL/AGAR DIL: CPT | Performed by: SURGERY

## 2020-02-25 PROCEDURE — 94799 UNLISTED PULMONARY SVC/PX: CPT

## 2020-02-25 PROCEDURE — 0FB24ZX EXCISION OF LEFT LOBE LIVER, PERCUTANEOUS ENDOSCOPIC APPROACH, DIAGNOSTIC: ICD-10-PCS | Performed by: SURGERY

## 2020-02-25 PROCEDURE — 25010000002 DEXAMETHASONE PER 1 MG: Performed by: NURSE ANESTHETIST, CERTIFIED REGISTERED

## 2020-02-25 PROCEDURE — 82962 GLUCOSE BLOOD TEST: CPT

## 2020-02-25 PROCEDURE — 25010000002 NEOSTIGMINE 10 MG/10ML SOLUTION: Performed by: NURSE ANESTHETIST, CERTIFIED REGISTERED

## 2020-02-25 PROCEDURE — 0DJ08ZZ INSPECTION OF UPPER INTESTINAL TRACT, VIA NATURAL OR ARTIFICIAL OPENING ENDOSCOPIC: ICD-10-PCS | Performed by: SURGERY

## 2020-02-25 PROCEDURE — 43775 LAP SLEEVE GASTRECTOMY: CPT | Performed by: SURGERY

## 2020-02-25 PROCEDURE — 87077 CULTURE AEROBIC IDENTIFY: CPT | Performed by: SURGERY

## 2020-02-25 PROCEDURE — 0DB64Z3 EXCISION OF STOMACH, PERCUTANEOUS ENDOSCOPIC APPROACH, VERTICAL: ICD-10-PCS | Performed by: SURGERY

## 2020-02-25 PROCEDURE — 63710000001 INSULIN LISPRO (HUMAN) PER 5 UNITS: Performed by: SURGERY

## 2020-02-25 PROCEDURE — 88313 SPECIAL STAINS GROUP 2: CPT | Performed by: SURGERY

## 2020-02-25 PROCEDURE — 81001 URINALYSIS AUTO W/SCOPE: CPT | Performed by: SURGERY

## 2020-02-25 PROCEDURE — 87086 URINE CULTURE/COLONY COUNT: CPT | Performed by: SURGERY

## 2020-02-25 PROCEDURE — 94640 AIRWAY INHALATION TREATMENT: CPT

## 2020-02-25 PROCEDURE — 25010000002 ONDANSETRON PER 1 MG: Performed by: SURGERY

## 2020-02-25 PROCEDURE — 25010000002 DEXAMETHASONE SODIUM PHOSPHATE 10 MG/ML SOLUTION: Performed by: ANESTHESIOLOGY

## 2020-02-25 DEVICE — SEALANT WND FIBRIN TISSEEL PREFIL/SYR/PRIMAFZ 4ML: Type: IMPLANTABLE DEVICE | Site: ABDOMEN | Status: FUNCTIONAL

## 2020-02-25 DEVICE — BLACK REINFORCED INTELLIGENT RELOAD, FOR USE WITH SIGNIA STAPLING SYSTEM
Type: IMPLANTABLE DEVICE | Site: ABDOMEN | Status: FUNCTIONAL
Brand: TRI-STAPLE 2.0

## 2020-02-25 DEVICE — REINFORCED INTELLIGENT RELOAD, FOR USE WITH SIGNIA STAPLING SYSTEM
Type: IMPLANTABLE DEVICE | Site: ABDOMEN | Status: FUNCTIONAL
Brand: TRI-STAPLE 2.0

## 2020-02-25 RX ORDER — ACETAMINOPHEN 500 MG
1000 TABLET ORAL EVERY 12 HOURS
Status: COMPLETED | OUTPATIENT
Start: 2020-02-25 | End: 2020-02-27

## 2020-02-25 RX ORDER — MAGNESIUM HYDROXIDE 1200 MG/15ML
LIQUID ORAL AS NEEDED
Status: DISCONTINUED | OUTPATIENT
Start: 2020-02-25 | End: 2020-02-25 | Stop reason: HOSPADM

## 2020-02-25 RX ORDER — PROCHLORPERAZINE MALEATE 10 MG
10 TABLET ORAL EVERY 6 HOURS PRN
Status: DISCONTINUED | OUTPATIENT
Start: 2020-02-25 | End: 2020-02-27 | Stop reason: HOSPADM

## 2020-02-25 RX ORDER — CYANOCOBALAMIN 1000 UG/ML
1000 INJECTION, SOLUTION INTRAMUSCULAR; SUBCUTANEOUS ONCE
Status: COMPLETED | OUTPATIENT
Start: 2020-02-26 | End: 2020-02-26

## 2020-02-25 RX ORDER — HYDROMORPHONE HYDROCHLORIDE 1 MG/ML
0.5 INJECTION, SOLUTION INTRAMUSCULAR; INTRAVENOUS; SUBCUTANEOUS
Status: DISCONTINUED | OUTPATIENT
Start: 2020-02-25 | End: 2020-02-25 | Stop reason: HOSPADM

## 2020-02-25 RX ORDER — PROMETHAZINE HYDROCHLORIDE 12.5 MG/1
12.5 TABLET ORAL EVERY 6 HOURS PRN
Status: DISCONTINUED | OUTPATIENT
Start: 2020-02-25 | End: 2020-02-27 | Stop reason: HOSPADM

## 2020-02-25 RX ORDER — HYDROMORPHONE HYDROCHLORIDE 1 MG/ML
0.5 INJECTION, SOLUTION INTRAMUSCULAR; INTRAVENOUS; SUBCUTANEOUS
Status: DISCONTINUED | OUTPATIENT
Start: 2020-02-25 | End: 2020-02-27 | Stop reason: HOSPADM

## 2020-02-25 RX ORDER — SODIUM CHLORIDE, SODIUM LACTATE, POTASSIUM CHLORIDE, CALCIUM CHLORIDE 600; 310; 30; 20 MG/100ML; MG/100ML; MG/100ML; MG/100ML
150 INJECTION, SOLUTION INTRAVENOUS CONTINUOUS
Status: ACTIVE | OUTPATIENT
Start: 2020-02-25 | End: 2020-02-26

## 2020-02-25 RX ORDER — LISINOPRIL 10 MG/1
10 TABLET ORAL DAILY
Status: DISCONTINUED | OUTPATIENT
Start: 2020-02-26 | End: 2020-02-27 | Stop reason: HOSPADM

## 2020-02-25 RX ORDER — NICOTINE POLACRILEX 4 MG
15 LOZENGE BUCCAL
Status: DISCONTINUED | OUTPATIENT
Start: 2020-02-25 | End: 2020-02-27 | Stop reason: HOSPADM

## 2020-02-25 RX ORDER — ROCURONIUM BROMIDE 10 MG/ML
INJECTION, SOLUTION INTRAVENOUS AS NEEDED
Status: DISCONTINUED | OUTPATIENT
Start: 2020-02-25 | End: 2020-02-25 | Stop reason: SURG

## 2020-02-25 RX ORDER — ACETAMINOPHEN 500 MG
1000 TABLET ORAL ONCE
Status: COMPLETED | OUTPATIENT
Start: 2020-02-25 | End: 2020-02-25

## 2020-02-25 RX ORDER — SODIUM CHLORIDE 9 MG/ML
INJECTION, SOLUTION INTRAVENOUS AS NEEDED
Status: DISCONTINUED | OUTPATIENT
Start: 2020-02-25 | End: 2020-02-25 | Stop reason: HOSPADM

## 2020-02-25 RX ORDER — GLYCOPYRROLATE 0.2 MG/ML
INJECTION INTRAMUSCULAR; INTRAVENOUS AS NEEDED
Status: DISCONTINUED | OUTPATIENT
Start: 2020-02-25 | End: 2020-02-25 | Stop reason: SURG

## 2020-02-25 RX ORDER — PANTOPRAZOLE SODIUM 40 MG/10ML
40 INJECTION, POWDER, LYOPHILIZED, FOR SOLUTION INTRAVENOUS ONCE
Status: COMPLETED | OUTPATIENT
Start: 2020-02-25 | End: 2020-02-25

## 2020-02-25 RX ORDER — PANTOPRAZOLE SODIUM 40 MG/1
40 TABLET, DELAYED RELEASE ORAL EVERY MORNING
Status: DISCONTINUED | OUTPATIENT
Start: 2020-02-26 | End: 2020-02-25 | Stop reason: SDUPTHER

## 2020-02-25 RX ORDER — OXYCODONE HYDROCHLORIDE 5 MG/1
5 TABLET ORAL EVERY 4 HOURS PRN
Status: DISCONTINUED | OUTPATIENT
Start: 2020-02-25 | End: 2020-02-27 | Stop reason: HOSPADM

## 2020-02-25 RX ORDER — SIMETHICONE 80 MG
80 TABLET,CHEWABLE ORAL 4 TIMES DAILY PRN
Status: DISCONTINUED | OUTPATIENT
Start: 2020-02-25 | End: 2020-02-27 | Stop reason: HOSPADM

## 2020-02-25 RX ORDER — FAMOTIDINE 10 MG/ML
20 INJECTION, SOLUTION INTRAVENOUS ONCE
Status: CANCELLED | OUTPATIENT
Start: 2020-02-25 | End: 2020-02-25

## 2020-02-25 RX ORDER — LIDOCAINE HYDROCHLORIDE 10 MG/ML
0.2 INJECTION, SOLUTION INFILTRATION; PERINEURAL ONCE
Status: COMPLETED | OUTPATIENT
Start: 2020-02-25 | End: 2020-02-25

## 2020-02-25 RX ORDER — DIPHENHYDRAMINE HYDROCHLORIDE 50 MG/ML
25 INJECTION INTRAMUSCULAR; INTRAVENOUS EVERY 4 HOURS PRN
Status: DISCONTINUED | OUTPATIENT
Start: 2020-02-25 | End: 2020-02-27 | Stop reason: HOSPADM

## 2020-02-25 RX ORDER — GABAPENTIN 300 MG/1
600 CAPSULE ORAL ONCE
Status: COMPLETED | OUTPATIENT
Start: 2020-02-25 | End: 2020-02-25

## 2020-02-25 RX ORDER — ONDANSETRON 2 MG/ML
4 INJECTION INTRAMUSCULAR; INTRAVENOUS EVERY 6 HOURS
Status: COMPLETED | OUTPATIENT
Start: 2020-02-25 | End: 2020-02-26

## 2020-02-25 RX ORDER — HYDROMORPHONE HYDROCHLORIDE 2 MG/1
2 TABLET ORAL
Status: DISCONTINUED | OUTPATIENT
Start: 2020-02-25 | End: 2020-02-27 | Stop reason: HOSPADM

## 2020-02-25 RX ORDER — SODIUM CHLORIDE, SODIUM LACTATE, POTASSIUM CHLORIDE, CALCIUM CHLORIDE 600; 310; 30; 20 MG/100ML; MG/100ML; MG/100ML; MG/100ML
9 INJECTION, SOLUTION INTRAVENOUS CONTINUOUS
Status: CANCELLED | OUTPATIENT
Start: 2020-02-25

## 2020-02-25 RX ORDER — DEXAMETHASONE SODIUM PHOSPHATE 4 MG/ML
INJECTION, SOLUTION INTRA-ARTICULAR; INTRALESIONAL; INTRAMUSCULAR; INTRAVENOUS; SOFT TISSUE AS NEEDED
Status: DISCONTINUED | OUTPATIENT
Start: 2020-02-25 | End: 2020-02-25 | Stop reason: SURG

## 2020-02-25 RX ORDER — FENTANYL CITRATE 50 UG/ML
50 INJECTION, SOLUTION INTRAMUSCULAR; INTRAVENOUS
Status: DISCONTINUED | OUTPATIENT
Start: 2020-02-25 | End: 2020-02-25 | Stop reason: HOSPADM

## 2020-02-25 RX ORDER — ALBUTEROL SULFATE 2.5 MG/3ML
2.5 SOLUTION RESPIRATORY (INHALATION)
Status: DISCONTINUED | OUTPATIENT
Start: 2020-02-25 | End: 2020-02-27 | Stop reason: HOSPADM

## 2020-02-25 RX ORDER — BACLOFEN 10 MG/1
10 TABLET ORAL NIGHTLY PRN
Status: DISCONTINUED | OUTPATIENT
Start: 2020-02-25 | End: 2020-02-27 | Stop reason: HOSPADM

## 2020-02-25 RX ORDER — ONDANSETRON 2 MG/ML
INJECTION INTRAMUSCULAR; INTRAVENOUS AS NEEDED
Status: DISCONTINUED | OUTPATIENT
Start: 2020-02-25 | End: 2020-02-25 | Stop reason: SURG

## 2020-02-25 RX ORDER — SODIUM CHLORIDE 9 MG/ML
150 INJECTION, SOLUTION INTRAVENOUS CONTINUOUS
Status: DISCONTINUED | OUTPATIENT
Start: 2020-02-25 | End: 2020-02-27 | Stop reason: HOSPADM

## 2020-02-25 RX ORDER — CEFAZOLIN SODIUM 2 G/100ML
2 INJECTION, SOLUTION INTRAVENOUS EVERY 8 HOURS
Status: COMPLETED | OUTPATIENT
Start: 2020-02-25 | End: 2020-02-26

## 2020-02-25 RX ORDER — PROMETHAZINE HYDROCHLORIDE 25 MG/ML
12.5 INJECTION, SOLUTION INTRAMUSCULAR; INTRAVENOUS EVERY 6 HOURS PRN
Status: DISCONTINUED | OUTPATIENT
Start: 2020-02-25 | End: 2020-02-27 | Stop reason: HOSPADM

## 2020-02-25 RX ORDER — SCOLOPAMINE TRANSDERMAL SYSTEM 1 MG/1
1 PATCH, EXTENDED RELEASE TRANSDERMAL ONCE
Status: DISCONTINUED | OUTPATIENT
Start: 2020-02-25 | End: 2020-02-25 | Stop reason: SDUPTHER

## 2020-02-25 RX ORDER — LABETALOL HYDROCHLORIDE 5 MG/ML
10 INJECTION, SOLUTION INTRAVENOUS
Status: DISCONTINUED | OUTPATIENT
Start: 2020-02-25 | End: 2020-02-27 | Stop reason: HOSPADM

## 2020-02-25 RX ORDER — BUPRENORPHINE HYDROCHLORIDE 0.32 MG/ML
INJECTION INTRAMUSCULAR; INTRAVENOUS
Status: COMPLETED | OUTPATIENT
Start: 2020-02-25 | End: 2020-02-25

## 2020-02-25 RX ORDER — ONDANSETRON 2 MG/ML
4 INJECTION INTRAMUSCULAR; INTRAVENOUS ONCE AS NEEDED
Status: DISCONTINUED | OUTPATIENT
Start: 2020-02-25 | End: 2020-02-25 | Stop reason: HOSPADM

## 2020-02-25 RX ORDER — SODIUM CHLORIDE 0.9 % (FLUSH) 0.9 %
3 SYRINGE (ML) INJECTION EVERY 12 HOURS SCHEDULED
Status: DISCONTINUED | OUTPATIENT
Start: 2020-02-25 | End: 2020-02-27 | Stop reason: HOSPADM

## 2020-02-25 RX ORDER — SODIUM CHLORIDE 0.9 % (FLUSH) 0.9 %
10 SYRINGE (ML) INJECTION EVERY 12 HOURS SCHEDULED
Status: CANCELLED | OUTPATIENT
Start: 2020-02-25

## 2020-02-25 RX ORDER — GABAPENTIN 100 MG/1
100 CAPSULE ORAL 3 TIMES DAILY
Status: COMPLETED | OUTPATIENT
Start: 2020-02-25 | End: 2020-02-27

## 2020-02-25 RX ORDER — BUPIVACAINE HYDROCHLORIDE 2.5 MG/ML
INJECTION, SOLUTION EPIDURAL; INFILTRATION; INTRACAUDAL
Status: COMPLETED | OUTPATIENT
Start: 2020-02-25 | End: 2020-02-25

## 2020-02-25 RX ORDER — NALOXONE HCL 0.4 MG/ML
0.1 VIAL (ML) INJECTION
Status: DISCONTINUED | OUTPATIENT
Start: 2020-02-25 | End: 2020-02-27 | Stop reason: HOSPADM

## 2020-02-25 RX ORDER — PANTOPRAZOLE SODIUM 40 MG/1
40 TABLET, DELAYED RELEASE ORAL
Status: DISCONTINUED | OUTPATIENT
Start: 2020-02-26 | End: 2020-02-27 | Stop reason: HOSPADM

## 2020-02-25 RX ORDER — DEXAMETHASONE SODIUM PHOSPHATE 10 MG/ML
INJECTION, SOLUTION INTRAMUSCULAR; INTRAVENOUS
Status: COMPLETED | OUTPATIENT
Start: 2020-02-25 | End: 2020-02-25

## 2020-02-25 RX ORDER — ALPRAZOLAM 0.25 MG/1
0.25 TABLET ORAL 2 TIMES DAILY PRN
Status: DISCONTINUED | OUTPATIENT
Start: 2020-02-25 | End: 2020-02-27 | Stop reason: HOSPADM

## 2020-02-25 RX ORDER — LIDOCAINE HYDROCHLORIDE 10 MG/ML
0.5 INJECTION, SOLUTION EPIDURAL; INFILTRATION; INTRACAUDAL; PERINEURAL ONCE AS NEEDED
Status: CANCELLED | OUTPATIENT
Start: 2020-02-25

## 2020-02-25 RX ORDER — PROPOFOL 10 MG/ML
VIAL (ML) INTRAVENOUS AS NEEDED
Status: DISCONTINUED | OUTPATIENT
Start: 2020-02-25 | End: 2020-02-25 | Stop reason: SURG

## 2020-02-25 RX ORDER — METOCLOPRAMIDE HYDROCHLORIDE 5 MG/ML
10 INJECTION INTRAMUSCULAR; INTRAVENOUS EVERY 6 HOURS PRN
Status: DISCONTINUED | OUTPATIENT
Start: 2020-02-25 | End: 2020-02-27 | Stop reason: HOSPADM

## 2020-02-25 RX ORDER — PANTOPRAZOLE SODIUM 40 MG/10ML
40 INJECTION, POWDER, LYOPHILIZED, FOR SOLUTION INTRAVENOUS ONCE
Status: DISCONTINUED | OUTPATIENT
Start: 2020-02-25 | End: 2020-02-25 | Stop reason: SDUPTHER

## 2020-02-25 RX ORDER — SCOLOPAMINE TRANSDERMAL SYSTEM 1 MG/1
1 PATCH, EXTENDED RELEASE TRANSDERMAL ONCE
Status: DISCONTINUED | OUTPATIENT
Start: 2020-02-25 | End: 2020-02-27 | Stop reason: HOSPADM

## 2020-02-25 RX ORDER — CHLORHEXIDINE GLUCONATE 0.12 MG/ML
15 RINSE ORAL
Status: DISCONTINUED | OUTPATIENT
Start: 2020-02-25 | End: 2020-02-25

## 2020-02-25 RX ORDER — SODIUM CHLORIDE AND POTASSIUM CHLORIDE 150; 450 MG/100ML; MG/100ML
100 INJECTION, SOLUTION INTRAVENOUS CONTINUOUS
Status: DISCONTINUED | OUTPATIENT
Start: 2020-02-26 | End: 2020-02-27 | Stop reason: HOSPADM

## 2020-02-25 RX ORDER — LIDOCAINE HYDROCHLORIDE 10 MG/ML
INJECTION, SOLUTION EPIDURAL; INFILTRATION; INTRACAUDAL; PERINEURAL AS NEEDED
Status: DISCONTINUED | OUTPATIENT
Start: 2020-02-25 | End: 2020-02-25 | Stop reason: SURG

## 2020-02-25 RX ORDER — SODIUM CHLORIDE 0.9 % (FLUSH) 0.9 %
10 SYRINGE (ML) INJECTION AS NEEDED
Status: CANCELLED | OUTPATIENT
Start: 2020-02-25

## 2020-02-25 RX ORDER — IPRATROPIUM BROMIDE AND ALBUTEROL SULFATE 2.5; .5 MG/3ML; MG/3ML
3 SOLUTION RESPIRATORY (INHALATION) ONCE
Status: COMPLETED | OUTPATIENT
Start: 2020-02-25 | End: 2020-02-25

## 2020-02-25 RX ORDER — CEFAZOLIN SODIUM 2 G/100ML
2 INJECTION, SOLUTION INTRAVENOUS ONCE
Status: COMPLETED | OUTPATIENT
Start: 2020-02-25 | End: 2020-02-25

## 2020-02-25 RX ORDER — FAMOTIDINE 20 MG/1
20 TABLET, FILM COATED ORAL ONCE
Status: CANCELLED | OUTPATIENT
Start: 2020-02-25 | End: 2020-02-25

## 2020-02-25 RX ORDER — DEXTROSE MONOHYDRATE 25 G/50ML
25 INJECTION, SOLUTION INTRAVENOUS
Status: DISCONTINUED | OUTPATIENT
Start: 2020-02-25 | End: 2020-02-27 | Stop reason: HOSPADM

## 2020-02-25 RX ORDER — NEOSTIGMINE METHYLSULFATE 1 MG/ML
INJECTION, SOLUTION INTRAVENOUS AS NEEDED
Status: DISCONTINUED | OUTPATIENT
Start: 2020-02-25 | End: 2020-02-25 | Stop reason: SURG

## 2020-02-25 RX ORDER — ONDANSETRON 4 MG/1
4 TABLET, FILM COATED ORAL EVERY 6 HOURS PRN
Status: DISCONTINUED | OUTPATIENT
Start: 2020-02-26 | End: 2020-02-27 | Stop reason: HOSPADM

## 2020-02-25 RX ORDER — SODIUM CHLORIDE 0.9 % (FLUSH) 0.9 %
1-10 SYRINGE (ML) INJECTION AS NEEDED
Status: DISCONTINUED | OUTPATIENT
Start: 2020-02-25 | End: 2020-02-27 | Stop reason: HOSPADM

## 2020-02-25 RX ORDER — ESMOLOL HYDROCHLORIDE 10 MG/ML
INJECTION INTRAVENOUS AS NEEDED
Status: DISCONTINUED | OUTPATIENT
Start: 2020-02-25 | End: 2020-02-25 | Stop reason: SURG

## 2020-02-25 RX ADMIN — INSULIN LISPRO 2 UNITS: 100 INJECTION, SOLUTION INTRAVENOUS; SUBCUTANEOUS at 20:53

## 2020-02-25 RX ADMIN — GABAPENTIN 600 MG: 300 CAPSULE ORAL at 07:57

## 2020-02-25 RX ADMIN — CEFAZOLIN SODIUM 2 G: 2 INJECTION, SOLUTION INTRAVENOUS at 09:08

## 2020-02-25 RX ADMIN — PANTOPRAZOLE SODIUM 40 MG: 40 INJECTION, POWDER, FOR SOLUTION INTRAVENOUS at 07:57

## 2020-02-25 RX ADMIN — CEFAZOLIN SODIUM 2 G: 2 INJECTION, SOLUTION INTRAVENOUS at 17:18

## 2020-02-25 RX ADMIN — NEOSTIGMINE 5 MG: 1 INJECTION INTRAVENOUS at 10:52

## 2020-02-25 RX ADMIN — DEXAMETHASONE SODIUM PHOSPHATE 4 MG: 10 INJECTION INTRAMUSCULAR; INTRAVENOUS at 09:35

## 2020-02-25 RX ADMIN — SODIUM CHLORIDE 150 ML/HR: 900 INJECTION INTRAVENOUS at 08:22

## 2020-02-25 RX ADMIN — ROCURONIUM BROMIDE 50 MG: 10 INJECTION INTRAVENOUS at 09:12

## 2020-02-25 RX ADMIN — LIDOCAINE HYDROCHLORIDE 50 MG: 10 INJECTION, SOLUTION EPIDURAL; INFILTRATION; INTRACAUDAL; PERINEURAL at 09:12

## 2020-02-25 RX ADMIN — PHENYLEPHRINE HYDROCHLORIDE 200 MCG: 10 INJECTION INTRAVENOUS at 10:09

## 2020-02-25 RX ADMIN — LIDOCAINE HYDROCHLORIDE 0.2 ML: 10 INJECTION, SOLUTION EPIDURAL; INFILTRATION; INTRACAUDAL; PERINEURAL at 07:44

## 2020-02-25 RX ADMIN — ACETAMINOPHEN 1000 MG: 500 TABLET ORAL at 07:57

## 2020-02-25 RX ADMIN — DEXAMETHASONE SODIUM PHOSPHATE 8 MG: 4 INJECTION, SOLUTION INTRAMUSCULAR; INTRAVENOUS at 09:12

## 2020-02-25 RX ADMIN — HYDROMORPHONE HYDROCHLORIDE 0.5 MG: 1 INJECTION, SOLUTION INTRAMUSCULAR; INTRAVENOUS; SUBCUTANEOUS at 11:50

## 2020-02-25 RX ADMIN — CHLORHEXIDINE GLUCONATE 0.12% ORAL RINSE 30 ML: 1.2 LIQUID ORAL at 08:00

## 2020-02-25 RX ADMIN — INSULIN LISPRO 4 UNITS: 100 INJECTION, SOLUTION INTRAVENOUS; SUBCUTANEOUS at 12:02

## 2020-02-25 RX ADMIN — BUPIVACAINE HYDROCHLORIDE 60 ML: 2.5 INJECTION, SOLUTION EPIDURAL; INFILTRATION; INTRACAUDAL; PERINEURAL at 09:35

## 2020-02-25 RX ADMIN — IPRATROPIUM BROMIDE AND ALBUTEROL SULFATE 3 ML: 2.5; .5 SOLUTION RESPIRATORY (INHALATION) at 11:47

## 2020-02-25 RX ADMIN — ESMOLOL HYDROCHLORIDE 20 MG: 10 INJECTION INTRAVENOUS at 09:12

## 2020-02-25 RX ADMIN — PHENYLEPHRINE HYDROCHLORIDE 100 MCG: 10 INJECTION INTRAVENOUS at 09:40

## 2020-02-25 RX ADMIN — BUPRENORPHINE HYDROCHLORIDE 0.3 MG: 0.32 INJECTION INTRAMUSCULAR; INTRAVENOUS at 09:35

## 2020-02-25 RX ADMIN — PHENYLEPHRINE HYDROCHLORIDE 100 MCG: 10 INJECTION INTRAVENOUS at 10:00

## 2020-02-25 RX ADMIN — ALBUTEROL SULFATE 2.5 MG: 2.5 SOLUTION RESPIRATORY (INHALATION) at 20:13

## 2020-02-25 RX ADMIN — SODIUM CHLORIDE 1000 ML: 9 INJECTION, SOLUTION INTRAVENOUS at 07:44

## 2020-02-25 RX ADMIN — SODIUM CHLORIDE 150 ML/HR: 900 INJECTION INTRAVENOUS at 13:20

## 2020-02-25 RX ADMIN — SODIUM CHLORIDE, POTASSIUM CHLORIDE, SODIUM LACTATE AND CALCIUM CHLORIDE 150 ML/HR: 600; 310; 30; 20 INJECTION, SOLUTION INTRAVENOUS at 11:26

## 2020-02-25 RX ADMIN — PHENYLEPHRINE HYDROCHLORIDE 100 MCG: 10 INJECTION INTRAVENOUS at 09:22

## 2020-02-25 RX ADMIN — ACETAMINOPHEN 1000 MG: 500 TABLET, FILM COATED ORAL at 20:53

## 2020-02-25 RX ADMIN — PHENYLEPHRINE HYDROCHLORIDE 100 MCG: 10 INJECTION INTRAVENOUS at 09:21

## 2020-02-25 RX ADMIN — ONDANSETRON 4 MG: 2 INJECTION INTRAMUSCULAR; INTRAVENOUS at 10:41

## 2020-02-25 RX ADMIN — SODIUM CHLORIDE: 900 INJECTION INTRAVENOUS at 10:43

## 2020-02-25 RX ADMIN — PHENYLEPHRINE HYDROCHLORIDE 100 MCG: 10 INJECTION INTRAVENOUS at 10:13

## 2020-02-25 RX ADMIN — GABAPENTIN 100 MG: 100 CAPSULE ORAL at 20:53

## 2020-02-25 RX ADMIN — ESMOLOL HYDROCHLORIDE 30 MG: 10 INJECTION INTRAVENOUS at 09:29

## 2020-02-25 RX ADMIN — PROPOFOL 200 MG: 10 INJECTION, EMULSION INTRAVENOUS at 09:12

## 2020-02-25 RX ADMIN — OXYCODONE HYDROCHLORIDE 5 MG: 5 TABLET ORAL at 21:00

## 2020-02-25 RX ADMIN — ONDANSETRON 4 MG: 2 INJECTION INTRAMUSCULAR; INTRAVENOUS at 17:40

## 2020-02-25 RX ADMIN — GABAPENTIN 100 MG: 100 CAPSULE ORAL at 17:13

## 2020-02-25 RX ADMIN — HYDROMORPHONE HYDROCHLORIDE 0.5 MG: 1 INJECTION, SOLUTION INTRAMUSCULAR; INTRAVENOUS; SUBCUTANEOUS at 11:26

## 2020-02-25 RX ADMIN — ROCURONIUM BROMIDE 5 MG: 10 INJECTION INTRAVENOUS at 10:13

## 2020-02-25 RX ADMIN — PROPOFOL 25 MCG/KG/MIN: 10 INJECTION, EMULSION INTRAVENOUS at 09:12

## 2020-02-25 RX ADMIN — SCOPALAMINE 1 PATCH: 1 PATCH, EXTENDED RELEASE TRANSDERMAL at 07:57

## 2020-02-25 RX ADMIN — SODIUM CHLORIDE 150 ML/HR: 900 INJECTION INTRAVENOUS at 21:00

## 2020-02-25 RX ADMIN — INSULIN LISPRO 2 UNITS: 100 INJECTION, SOLUTION INTRAVENOUS; SUBCUTANEOUS at 17:13

## 2020-02-25 RX ADMIN — PHENYLEPHRINE HYDROCHLORIDE 100 MCG: 10 INJECTION INTRAVENOUS at 09:57

## 2020-02-25 RX ADMIN — PHENYLEPHRINE HYDROCHLORIDE 200 MCG: 10 INJECTION INTRAVENOUS at 09:48

## 2020-02-25 RX ADMIN — GLYCOPYRROLATE 0.8 MG: 0.2 INJECTION INTRAMUSCULAR; INTRAVENOUS at 10:52

## 2020-02-25 NOTE — ANESTHESIA PROCEDURE NOTES
Airway  Urgency: elective    Date/Time: 2/25/2020 9:14 AM  Airway not difficult    General Information and Staff    Patient location during procedure: OR  CRNA: Ricardo Moise CRNA    Indications and Patient Condition  Indications for airway management: airway protection    Preoxygenated: yes  MILS not maintained throughout  Mask difficulty assessment: 2 - vent by mask + OA or adjuvant +/- NMBA    Final Airway Details  Final airway type: endotracheal airway      Successful airway: ETT  Cuffed: yes   Successful intubation technique: direct laryngoscopy  Facilitating devices/methods: intubating stylet  Endotracheal tube insertion site: oral  Blade: Fortino  Blade size: 3  ETT size (mm): 7.5  Cormack-Lehane Classification: grade I - full view of glottis  Placement verified by: chest auscultation and capnometry   Measured from: lips  ETT/EBT  to lips (cm): 20  Number of attempts at approach: 1  Assessment: lips, teeth, and gum same as pre-op and atraumatic intubation    Additional Comments  Negative epigastric sounds, Breath sound equal bilaterally with symmetric chest rise and fall

## 2020-02-25 NOTE — ANESTHESIA PREPROCEDURE EVALUATION
Anesthesia Evaluation                  Airway   Mallampati: II  TM distance: >3 FB  Neck ROM: full  No difficulty expected  Dental - normal exam     Pulmonary - normal exam   (+) a smoker Former, asthma,shortness of breath, sleep apnea,   Cardiovascular - normal exam    (+) hypertension, hyperlipidemia,       Neuro/Psych  (+) psychiatric history Anxiety,     GI/Hepatic/Renal/Endo    (+) morbid obesity, hiatal hernia, GERD,  diabetes mellitus,     Musculoskeletal     Abdominal  - normal exam    Bowel sounds: normal.   Substance History      OB/GYN          Other                      Anesthesia Plan    ASA 3     general   (TAPS FOR POSTOP PAIN)  intravenous induction     Anesthetic plan, all risks, benefits, and alternatives have been provided, discussed and informed consent has been obtained with: patient.    Plan discussed with CRNA.

## 2020-02-25 NOTE — ANESTHESIA PROCEDURE NOTES
Peripheral Block      Patient reassessed immediately prior to procedure    Patient location during procedure: OR  Reason for block: at surgeon's request and post-op pain management  Performed by  CRNA: Vandana Schmitt CRNA  Assisted by: Ricardo Moise CRNA  Preanesthetic Checklist  Completed: patient identified, site marked, surgical consent, pre-op evaluation, timeout performed, IV checked, risks and benefits discussed and monitors and equipment checked  Prep:  Pt Position: supine  Sterile barriers:cap, gloves, sterile barriers and mask  Prep: ChloraPrep  Patient monitoring: blood pressure monitoring, continuous pulse oximetry and EKG  Procedure  Sedation:yes  Performed under: general  Guidance:ultrasound guided  Images:still images obtained, printed/placed on chart    Laterality:Bilateral  Block Type:TAP  Injection Technique:single-shot  Needle Type:short-bevel and echogenic  Needle Gauge:20 G  Resistance on Injection: none    Medications Used: buprenorphine (BUPRENEX) injection, 0.3 mg  dexamethasone sodium phosphate injection, 4 mg  bupivacaine PF (MARCAINE) 0.25 % injection, 60 mL  Med admintered at 2/25/2020 9:35 AM      Medications  Comment:Block Injection:  LA dose divided between Right and Left block    4 quadrant    Post Assessment  Injection Assessment: negative aspiration for heme, incremental injection and no paresthesia on injection  Patient Tolerance:comfortable throughout block  Complications:no  Additional Notes      Under Ultrasound guidance, a BBraun 4inch 360 degree needle was advanced with Normal Saline hydro dissection of tissue.  The Internal Oblique and Transversus Abdominus muscles where visualized.  At or before the aponeurosis of Internal Oblique, local anesthetic spread was visualized in the Transversus Abdominus Plane. Injection was made incrementally with aspiration every 5 mls.  There was no  intravascular injection,  injection pressure was normal, there was no neural  injection, and the procedure was completed without difficulty.  Thank You.

## 2020-02-26 ENCOUNTER — APPOINTMENT (OUTPATIENT)
Dept: GENERAL RADIOLOGY | Facility: HOSPITAL | Age: 45
End: 2020-02-26

## 2020-02-26 LAB
ALBUMIN SERPL-MCNC: 3.7 G/DL (ref 3.5–5.2)
ALBUMIN/GLOB SERPL: 1.6 G/DL
ALP SERPL-CCNC: 65 U/L (ref 39–117)
ALT SERPL W P-5'-P-CCNC: 193 U/L (ref 1–33)
ANION GAP SERPL CALCULATED.3IONS-SCNC: 13 MMOL/L (ref 5–15)
AST SERPL-CCNC: 93 U/L (ref 1–32)
BASOPHILS # BLD AUTO: 0.03 10*3/MM3 (ref 0–0.2)
BASOPHILS NFR BLD AUTO: 0.2 % (ref 0–1.5)
BILIRUB SERPL-MCNC: 0.2 MG/DL (ref 0.2–1.2)
BUN BLD-MCNC: 14 MG/DL (ref 6–20)
BUN/CREAT SERPL: 18.4 (ref 7–25)
CALCIUM SPEC-SCNC: 8.4 MG/DL (ref 8.6–10.5)
CHLORIDE SERPL-SCNC: 106 MMOL/L (ref 98–107)
CO2 SERPL-SCNC: 22 MMOL/L (ref 22–29)
CREAT BLD-MCNC: 0.76 MG/DL (ref 0.57–1)
DEPRECATED RDW RBC AUTO: 57.8 FL (ref 37–54)
EOSINOPHIL # BLD AUTO: 0.03 10*3/MM3 (ref 0–0.4)
EOSINOPHIL NFR BLD AUTO: 0.2 % (ref 0.3–6.2)
ERYTHROCYTE [DISTWIDTH] IN BLOOD BY AUTOMATED COUNT: 20.1 % (ref 12.3–15.4)
GFR SERPL CREATININE-BSD FRML MDRD: 82 ML/MIN/1.73
GLOBULIN UR ELPH-MCNC: 2.3 GM/DL
GLUCOSE BLD-MCNC: 86 MG/DL (ref 65–99)
GLUCOSE BLDC GLUCOMTR-MCNC: 79 MG/DL (ref 70–130)
GLUCOSE BLDC GLUCOMTR-MCNC: 90 MG/DL (ref 70–130)
GLUCOSE BLDC GLUCOMTR-MCNC: 91 MG/DL (ref 70–130)
GLUCOSE BLDC GLUCOMTR-MCNC: 94 MG/DL (ref 70–130)
HCT VFR BLD AUTO: 37.8 % (ref 34–46.6)
HGB BLD-MCNC: 11.2 G/DL (ref 12–15.9)
IMM GRANULOCYTES # BLD AUTO: 0.1 10*3/MM3 (ref 0–0.05)
IMM GRANULOCYTES NFR BLD AUTO: 0.7 % (ref 0–0.5)
IRON 24H UR-MRATE: 24 MCG/DL (ref 37–145)
LYMPHOCYTES # BLD AUTO: 1.8 10*3/MM3 (ref 0.7–3.1)
LYMPHOCYTES NFR BLD AUTO: 11.7 % (ref 19.6–45.3)
MCH RBC QN AUTO: 23.8 PG (ref 26.6–33)
MCHC RBC AUTO-ENTMCNC: 29.6 G/DL (ref 31.5–35.7)
MCV RBC AUTO: 80.3 FL (ref 79–97)
MONOCYTES # BLD AUTO: 1.82 10*3/MM3 (ref 0.1–0.9)
MONOCYTES NFR BLD AUTO: 11.8 % (ref 5–12)
NEUTROPHILS # BLD AUTO: 11.6 10*3/MM3 (ref 1.7–7)
NEUTROPHILS NFR BLD AUTO: 75.4 % (ref 42.7–76)
NRBC BLD AUTO-RTO: 0 /100 WBC (ref 0–0.2)
PLATELET # BLD AUTO: 211 10*3/MM3 (ref 140–450)
POTASSIUM BLD-SCNC: 3.8 MMOL/L (ref 3.5–5.2)
PROT SERPL-MCNC: 6 G/DL (ref 6–8.5)
RBC # BLD AUTO: 4.71 10*6/MM3 (ref 3.77–5.28)
SODIUM BLD-SCNC: 141 MMOL/L (ref 136–145)
WBC NRBC COR # BLD: 15.38 10*3/MM3 (ref 3.4–10.8)

## 2020-02-26 PROCEDURE — 80053 COMPREHEN METABOLIC PANEL: CPT | Performed by: SURGERY

## 2020-02-26 PROCEDURE — 25010000002 ONDANSETRON PER 1 MG: Performed by: SURGERY

## 2020-02-26 PROCEDURE — 82962 GLUCOSE BLOOD TEST: CPT

## 2020-02-26 PROCEDURE — 25010000002 ENOXAPARIN PER 10 MG: Performed by: SURGERY

## 2020-02-26 PROCEDURE — 25010000002 CEFTRIAXONE PER 250 MG: Performed by: SURGERY

## 2020-02-26 PROCEDURE — 25010000002 NA FERRIC GLUC CPLX PER 12.5 MG: Performed by: SURGERY

## 2020-02-26 PROCEDURE — 25010000002 CYANOCOBALAMIN PER 1000 MCG: Performed by: SURGERY

## 2020-02-26 PROCEDURE — 99024 POSTOP FOLLOW-UP VISIT: CPT | Performed by: SURGERY

## 2020-02-26 PROCEDURE — 85025 COMPLETE CBC W/AUTO DIFF WBC: CPT | Performed by: SURGERY

## 2020-02-26 PROCEDURE — 25010000002 THIAMINE PER 100 MG: Performed by: SURGERY

## 2020-02-26 PROCEDURE — 74240 X-RAY XM UPR GI TRC 1CNTRST: CPT

## 2020-02-26 PROCEDURE — 25010000003 POTASSIUM CHLORIDE PER 2 MEQ: Performed by: SURGERY

## 2020-02-26 PROCEDURE — 94799 UNLISTED PULMONARY SVC/PX: CPT

## 2020-02-26 PROCEDURE — 63710000001 ONDANSETRON PER 8 MG: Performed by: SURGERY

## 2020-02-26 PROCEDURE — 25010000003 CEFAZOLIN IN DEXTROSE 2-4 GM/100ML-% SOLUTION: Performed by: SURGERY

## 2020-02-26 PROCEDURE — 25010000002 METOCLOPRAMIDE PER 10 MG: Performed by: SURGERY

## 2020-02-26 PROCEDURE — G0108 DIAB MANAGE TRN  PER INDIV: HCPCS

## 2020-02-26 PROCEDURE — 83540 ASSAY OF IRON: CPT | Performed by: SURGERY

## 2020-02-26 RX ADMIN — ACETAMINOPHEN 1000 MG: 500 TABLET, FILM COATED ORAL at 21:53

## 2020-02-26 RX ADMIN — ONDANSETRON 4 MG: 2 INJECTION INTRAMUSCULAR; INTRAVENOUS at 06:50

## 2020-02-26 RX ADMIN — LISINOPRIL 10 MG: 10 TABLET ORAL at 08:07

## 2020-02-26 RX ADMIN — CEFAZOLIN SODIUM 2 G: 2 INJECTION, SOLUTION INTRAVENOUS at 01:07

## 2020-02-26 RX ADMIN — ALBUTEROL SULFATE 2.5 MG: 2.5 SOLUTION RESPIRATORY (INHALATION) at 10:56

## 2020-02-26 RX ADMIN — SODIUM CHLORIDE 125 MG: 9 INJECTION, SOLUTION INTRAVENOUS at 11:22

## 2020-02-26 RX ADMIN — ONDANSETRON 4 MG: 2 INJECTION INTRAMUSCULAR; INTRAVENOUS at 11:22

## 2020-02-26 RX ADMIN — GABAPENTIN 100 MG: 100 CAPSULE ORAL at 08:06

## 2020-02-26 RX ADMIN — BACLOFEN 10 MG: 10 TABLET ORAL at 20:22

## 2020-02-26 RX ADMIN — CEFTRIAXONE SODIUM 1 G: 1 INJECTION, POWDER, FOR SOLUTION INTRAMUSCULAR; INTRAVENOUS at 20:23

## 2020-02-26 RX ADMIN — SIMETHICONE 80 MG: 80 TABLET, CHEWABLE ORAL at 20:22

## 2020-02-26 RX ADMIN — CYANOCOBALAMIN 1000 MCG: 1000 INJECTION, SOLUTION INTRAMUSCULAR; SUBCUTANEOUS at 08:06

## 2020-02-26 RX ADMIN — GABAPENTIN 100 MG: 100 CAPSULE ORAL at 20:22

## 2020-02-26 RX ADMIN — ALBUTEROL SULFATE 2.5 MG: 2.5 SOLUTION RESPIRATORY (INHALATION) at 15:33

## 2020-02-26 RX ADMIN — SODIUM CHLORIDE 150 ML/HR: 900 INJECTION INTRAVENOUS at 20:28

## 2020-02-26 RX ADMIN — POTASSIUM CHLORIDE AND SODIUM CHLORIDE 100 ML/HR: 450; 150 INJECTION, SOLUTION INTRAVENOUS at 12:34

## 2020-02-26 RX ADMIN — METOCLOPRAMIDE 10 MG: 5 INJECTION, SOLUTION INTRAMUSCULAR; INTRAVENOUS at 15:41

## 2020-02-26 RX ADMIN — GABAPENTIN 100 MG: 100 CAPSULE ORAL at 15:37

## 2020-02-26 RX ADMIN — ALBUTEROL SULFATE 2.5 MG: 2.5 SOLUTION RESPIRATORY (INHALATION) at 06:56

## 2020-02-26 RX ADMIN — ENOXAPARIN SODIUM 40 MG: 40 INJECTION SUBCUTANEOUS at 08:06

## 2020-02-26 RX ADMIN — FOLIC ACID 250 ML/HR: 5 INJECTION, SOLUTION INTRAMUSCULAR; INTRAVENOUS; SUBCUTANEOUS at 07:00

## 2020-02-26 RX ADMIN — METOCLOPRAMIDE 10 MG: 5 INJECTION, SOLUTION INTRAMUSCULAR; INTRAVENOUS at 21:53

## 2020-02-26 RX ADMIN — OXYCODONE HYDROCHLORIDE 5 MG: 5 TABLET ORAL at 18:50

## 2020-02-26 RX ADMIN — ACETAMINOPHEN 1000 MG: 500 TABLET, FILM COATED ORAL at 08:06

## 2020-02-26 RX ADMIN — PANTOPRAZOLE SODIUM 40 MG: 40 TABLET, DELAYED RELEASE ORAL at 06:50

## 2020-02-26 RX ADMIN — ONDANSETRON HYDROCHLORIDE 4 MG: 4 TABLET, FILM COATED ORAL at 18:50

## 2020-02-26 RX ADMIN — SODIUM CHLORIDE, PRESERVATIVE FREE 3 ML: 5 INJECTION INTRAVENOUS at 08:06

## 2020-02-26 RX ADMIN — METOCLOPRAMIDE 10 MG: 5 INJECTION, SOLUTION INTRAMUSCULAR; INTRAVENOUS at 03:57

## 2020-02-26 RX ADMIN — OXYCODONE HYDROCHLORIDE 5 MG: 5 TABLET ORAL at 10:19

## 2020-02-26 RX ADMIN — ONDANSETRON 4 MG: 2 INJECTION INTRAMUSCULAR; INTRAVENOUS at 01:07

## 2020-02-26 RX ADMIN — OXYCODONE HYDROCHLORIDE 5 MG: 5 TABLET ORAL at 04:07

## 2020-02-27 VITALS
DIASTOLIC BLOOD PRESSURE: 83 MMHG | WEIGHT: 291.3 LBS | TEMPERATURE: 98.5 F | HEART RATE: 77 BPM | HEIGHT: 64 IN | BODY MASS INDEX: 49.73 KG/M2 | SYSTOLIC BLOOD PRESSURE: 158 MMHG | RESPIRATION RATE: 18 BRPM | OXYGEN SATURATION: 93 %

## 2020-02-27 LAB
ALBUMIN SERPL-MCNC: 3.4 G/DL (ref 3.5–5.2)
ALBUMIN/GLOB SERPL: 1.3 G/DL
ALP SERPL-CCNC: 63 U/L (ref 39–117)
ALT SERPL W P-5'-P-CCNC: 113 U/L (ref 1–33)
ANION GAP SERPL CALCULATED.3IONS-SCNC: 13 MMOL/L (ref 5–15)
AST SERPL-CCNC: 41 U/L (ref 1–32)
BACTERIA SPEC AEROBE CULT: ABNORMAL
BACTERIA SPEC AEROBE CULT: ABNORMAL
BASOPHILS # BLD AUTO: 0.05 10*3/MM3 (ref 0–0.2)
BASOPHILS NFR BLD AUTO: 0.4 % (ref 0–1.5)
BILIRUB SERPL-MCNC: 0.3 MG/DL (ref 0.2–1.2)
BUN BLD-MCNC: 13 MG/DL (ref 6–20)
BUN/CREAT SERPL: 18.8 (ref 7–25)
CALCIUM SPEC-SCNC: 8.3 MG/DL (ref 8.6–10.5)
CHLORIDE SERPL-SCNC: 105 MMOL/L (ref 98–107)
CO2 SERPL-SCNC: 23 MMOL/L (ref 22–29)
CREAT BLD-MCNC: 0.69 MG/DL (ref 0.57–1)
DEPRECATED RDW RBC AUTO: 58.1 FL (ref 37–54)
EOSINOPHIL # BLD AUTO: 0.21 10*3/MM3 (ref 0–0.4)
EOSINOPHIL NFR BLD AUTO: 1.8 % (ref 0.3–6.2)
ERYTHROCYTE [DISTWIDTH] IN BLOOD BY AUTOMATED COUNT: 19.9 % (ref 12.3–15.4)
GFR SERPL CREATININE-BSD FRML MDRD: 92 ML/MIN/1.73
GLOBULIN UR ELPH-MCNC: 2.6 GM/DL
GLUCOSE BLD-MCNC: 96 MG/DL (ref 65–99)
GLUCOSE BLDC GLUCOMTR-MCNC: 96 MG/DL (ref 70–130)
GLUCOSE BLDC GLUCOMTR-MCNC: 98 MG/DL (ref 70–130)
HCT VFR BLD AUTO: 36.3 % (ref 34–46.6)
HGB BLD-MCNC: 10.8 G/DL (ref 12–15.9)
IMM GRANULOCYTES # BLD AUTO: 0.09 10*3/MM3 (ref 0–0.05)
IMM GRANULOCYTES NFR BLD AUTO: 0.8 % (ref 0–0.5)
LYMPHOCYTES # BLD AUTO: 1.51 10*3/MM3 (ref 0.7–3.1)
LYMPHOCYTES NFR BLD AUTO: 12.7 % (ref 19.6–45.3)
MCH RBC QN AUTO: 24 PG (ref 26.6–33)
MCHC RBC AUTO-ENTMCNC: 29.8 G/DL (ref 31.5–35.7)
MCV RBC AUTO: 80.7 FL (ref 79–97)
MONOCYTES # BLD AUTO: 1.42 10*3/MM3 (ref 0.1–0.9)
MONOCYTES NFR BLD AUTO: 11.9 % (ref 5–12)
NEUTROPHILS # BLD AUTO: 8.61 10*3/MM3 (ref 1.7–7)
NEUTROPHILS NFR BLD AUTO: 72.4 % (ref 42.7–76)
NRBC BLD AUTO-RTO: 0 /100 WBC (ref 0–0.2)
PLATELET # BLD AUTO: 208 10*3/MM3 (ref 140–450)
PMV BLD AUTO: 12.4 FL (ref 6–12)
POTASSIUM BLD-SCNC: 3.4 MMOL/L (ref 3.5–5.2)
PROT SERPL-MCNC: 6 G/DL (ref 6–8.5)
RBC # BLD AUTO: 4.5 10*6/MM3 (ref 3.77–5.28)
SODIUM BLD-SCNC: 141 MMOL/L (ref 136–145)
WBC NRBC COR # BLD: 11.89 10*3/MM3 (ref 3.4–10.8)

## 2020-02-27 PROCEDURE — 25010000002 CEFTRIAXONE PER 250 MG: Performed by: SURGERY

## 2020-02-27 PROCEDURE — 94799 UNLISTED PULMONARY SVC/PX: CPT

## 2020-02-27 PROCEDURE — 82962 GLUCOSE BLOOD TEST: CPT

## 2020-02-27 PROCEDURE — 25010000002 ENOXAPARIN PER 10 MG: Performed by: SURGERY

## 2020-02-27 PROCEDURE — 80053 COMPREHEN METABOLIC PANEL: CPT | Performed by: SURGERY

## 2020-02-27 PROCEDURE — 99024 POSTOP FOLLOW-UP VISIT: CPT | Performed by: SURGERY

## 2020-02-27 PROCEDURE — 85025 COMPLETE CBC W/AUTO DIFF WBC: CPT | Performed by: SURGERY

## 2020-02-27 RX ORDER — SENNOSIDES 8.6 MG
650 CAPSULE ORAL EVERY 6 HOURS
Qty: 20 TABLET | Refills: 0 | Status: SHIPPED | OUTPATIENT
Start: 2020-02-27 | End: 2021-10-19

## 2020-02-27 RX ORDER — ONDANSETRON 4 MG/1
4 TABLET, ORALLY DISINTEGRATING ORAL EVERY 8 HOURS PRN
Qty: 10 TABLET | Refills: 0 | Status: SHIPPED | OUTPATIENT
Start: 2020-02-27 | End: 2021-10-19

## 2020-02-27 RX ORDER — OXYCODONE HYDROCHLORIDE 5 MG/1
5 TABLET ORAL EVERY 6 HOURS PRN
Qty: 10 TABLET | Refills: 0 | Status: SHIPPED | OUTPATIENT
Start: 2020-02-27 | End: 2020-04-01

## 2020-02-27 RX ORDER — POTASSIUM CHLORIDE 750 MG/1
40 CAPSULE, EXTENDED RELEASE ORAL ONCE
Status: COMPLETED | OUTPATIENT
Start: 2020-02-27 | End: 2020-02-27

## 2020-02-27 RX ORDER — AMOXICILLIN AND CLAVULANATE POTASSIUM 875; 125 MG/1; MG/1
1 TABLET, FILM COATED ORAL 2 TIMES DAILY
Qty: 10 TABLET | Refills: 0 | Status: SHIPPED | OUTPATIENT
Start: 2020-02-27 | End: 2020-04-01

## 2020-02-27 RX ORDER — OMEPRAZOLE 40 MG/1
40 CAPSULE, DELAYED RELEASE ORAL DAILY
Qty: 60 CAPSULE | Refills: 0 | Status: SHIPPED | OUTPATIENT
Start: 2020-02-27 | End: 2020-02-28 | Stop reason: SDUPTHER

## 2020-02-27 RX ORDER — PROMETHAZINE HYDROCHLORIDE 12.5 MG/1
12.5 TABLET ORAL EVERY 6 HOURS PRN
Qty: 10 TABLET | Refills: 0 | Status: SHIPPED | OUTPATIENT
Start: 2020-02-27 | End: 2021-10-19

## 2020-02-27 RX ADMIN — ACETAMINOPHEN 1000 MG: 500 TABLET, FILM COATED ORAL at 08:26

## 2020-02-27 RX ADMIN — CEFTRIAXONE SODIUM 1 G: 1 INJECTION, POWDER, FOR SOLUTION INTRAMUSCULAR; INTRAVENOUS at 08:25

## 2020-02-27 RX ADMIN — ENOXAPARIN SODIUM 40 MG: 40 INJECTION SUBCUTANEOUS at 08:25

## 2020-02-27 RX ADMIN — ALBUTEROL SULFATE 2.5 MG: 2.5 SOLUTION RESPIRATORY (INHALATION) at 06:59

## 2020-02-27 RX ADMIN — GABAPENTIN 100 MG: 100 CAPSULE ORAL at 08:26

## 2020-02-27 RX ADMIN — POTASSIUM CHLORIDE 40 MEQ: 10 CAPSULE, COATED, EXTENDED RELEASE ORAL at 11:53

## 2020-02-27 RX ADMIN — PANTOPRAZOLE SODIUM 40 MG: 40 TABLET, DELAYED RELEASE ORAL at 05:26

## 2020-02-27 RX ADMIN — LISINOPRIL 10 MG: 10 TABLET ORAL at 08:26

## 2020-02-28 ENCOUNTER — READMISSION MANAGEMENT (OUTPATIENT)
Dept: CALL CENTER | Facility: HOSPITAL | Age: 45
End: 2020-02-28

## 2020-02-28 RX ORDER — OMEPRAZOLE 40 MG/1
40 CAPSULE, DELAYED RELEASE ORAL DAILY
Qty: 30 CAPSULE | Refills: 0 | Status: SHIPPED | OUTPATIENT
Start: 2020-02-28 | End: 2020-04-14

## 2020-02-28 NOTE — OUTREACH NOTE
Prep Survey      Responses   Facility patient discharged from?  [facility]   Is patient eligible?  Yes   Discharge diagnosis  Obesity, Class III, BMI 40-49.9 (morbid obesity) (CMS/[facility])    Does the patient have one of the following disease processes/diagnoses(primary or secondary)?  General Surgery   Does the patient have Home health ordered?  No   Is there a DME ordered?  No   Prep survey completed?  Yes          Chanda Ashford RN

## 2020-02-28 NOTE — ANESTHESIA POSTPROCEDURE EVALUATION
Patient: Eloise Delatorre    Procedure Summary     Date:  02/25/20 Room / Location:   [facility]    Anesthesia Start:  0908 Anesthesia Stop:  1115    Procedures:       GASTRIC SLEEVE LAPAROSCOPIC (N/A Abdomen)      ESOPHAGOGASTRODUODENOSCOPY (N/A Esophagus)      LIVER BIOPSY LAPAROSCOPIC (N/A Abdomen) Diagnosis:       Obesity, Class III, BMI 40-49.9 (morbid obesity) (CMS/HCC)      Hiatal hernia      (Obesity, Class III, BMI 40-49.9 (morbid obesity) (CMS/HCC) [E66.01])      (Hiatal hernia [K44.9])    Surgeon:  Lila Guzman MD Provider:  Mika Jones MD    Anesthesia Type:  general ASA Status:  3          Anesthesia Type: general    Vitals  Vitals Value Taken Time   /100 2/25/2020 12:15 PM   Temp 99 °F (37.2 °C) 2/25/2020 12:00 PM   Pulse 95 2/25/2020 12:29 PM   Resp 20 2/25/2020 12:15 PM   SpO2 88 % 2/25/2020 12:30 PM   Vitals shown include unvalidated device data.        Post Anesthesia Care and Evaluation    Patient location during evaluation: PACU  Patient participation: complete - patient participated  Level of consciousness: awake and alert  Pain score: 0  Pain management: adequate  Airway patency: patent  Anesthetic complications: No anesthetic complications  PONV Status: none  Cardiovascular status: hemodynamically stable and acceptable  Respiratory status: nonlabored ventilation, acceptable and nasal cannula  Hydration status: acceptable

## 2020-03-02 ENCOUNTER — READMISSION MANAGEMENT (OUTPATIENT)
Dept: CALL CENTER | Facility: HOSPITAL | Age: 45
End: 2020-03-02

## 2020-03-02 NOTE — OUTREACH NOTE
General Surgery Week 1 Survey      Responses   Facility patient discharged from?  [facility]   Does the patient have one of the following disease processes/diagnoses(primary or secondary)?  General Surgery   Is there a successful TCM telephone encounter documented?  No   Week 1 attempt successful?  Yes   Call start time  1245   Call end time  1248   Discharge diagnosis  Obesity, Class III, BMI 40-49.9 (morbid obesity) (CMS/formerly [facility])    Meds reviewed with patient/caregiver?  Yes   Is the patient having any side effects they believe may be caused by any medication additions or changes?  No   Does the patient have all medications related to this admission filled (includes all antibiotics, pain medications, etc.)  Yes   Is the patient taking all medications as directed (includes completed medication regime)?  Yes   Does the patient have a follow up appointment scheduled with their surgeon?  Yes   Has the patient kept scheduled appointments due by today?  N/A   Comments  Has followup with surgeon tomorrow 3/3/2020   Has home health visited the patient within 72 hours of discharge?  N/A   Psychosocial issues?  No   Did the patient receive a copy of their discharge instructions?  Yes   Nursing interventions  Reviewed instructions with patient   What is the patient's perception of their health status since discharge?  Improving   Nursing interventions  Nurse provided patient education   Is the patient /caregiver able to teach back basic post-op care?  Continue use of incentive spirometry at least 1 week post discharge, Lifting as instructed by MD in discharge instructions, Keep incision areas clean,dry and protected, No tub bath, swimming, or hot tub until instructed by MD, Take showers only when approved by MD-sponge bathe until then, Drive as instructed by MD in discharge instructions   Is the patient/caregiver able to teach back signs and symptoms of incisional infection?  Increased redness, swelling or pain at the incisonal  site, Increased drainage or bleeding, Incisional warmth, Pus or odor from incision, Fever   Is the patient/caregiver able to teach back steps to recovery at home?  Set small, achievable goals for return to baseline health, Rest and rebuild strength, gradually increase activity, Make a list of questions for surgeon's appointment   Is the patient/caregiver able to teach back the hierarchy of who to call/visit for symptoms/problems? PCP, Specialist, Home health nurse, Urgent Care, ED, 911  Yes   Week 1 call completed?  Yes   Revoked  No further contact(revokes)-requires comment   Graduated/Revoked comments  Declines further calls.           Markus Chavarria RN

## 2020-03-03 NOTE — DISCHARGE SUMMARY
Discharge Summary    Patient name: Eloise Delatorre    Medical record number: [id number]    Admission date: 2/25/2020  Discharge date:  2/27/2020    Attending physician: Lila Guzman MD    Primary care physician: Onofre Butler MD    Condition on discharge: Stable    Primary Diagnoses:  Morbid obesity with co-morbidities    Operative Procedure:  2/25/20 laparoscopic sleeve gastrectomy and liver biopsy    Hospital Course: The patient is a very pleasant 44 y.o. female that was admitted to the hospital after elective laparoscopic sleeve gastrectomy and liver biopsy.  On POD#1, UGI was without obstruction or leak. The patient had some had some nausea, but by POD#2, was ambulating well, tolerating stage 1 diet, and felt ready to be discharged.  Please see daily progress notes for full details of hospital stay.  Eloise Delatorre was discharged home in good condition with instructions to follow up in the office in 1 week. Additionally, she was given 5 days of Augmentin for UTI with dx + for >100,000 Proteus, present on admission.      Discharge medications:      Discharge Medications      New Medications      Instructions Start Date   8 HOUR ARTHRITIS PAIN RELIEVER 650 MG 8 hr tablet  Generic drug:  acetaminophen   650 mg, Oral, Every 6 Hours      amoxicillin-clavulanate 875-125 MG per tablet  Commonly known as:  AUGMENTIN   1 tablet, Oral, 2 Times Daily      ondansetron ODT 4 MG disintegrating tablet  Commonly known as:  ZOFRAN-ODT   4 mg, Oral, Every 8 Hours PRN      oxyCODONE 5 MG immediate release tablet  Commonly known as:  ROXICODONE   5 mg, Oral, Every 6 Hours PRN      promethazine 12.5 MG tablet  Commonly known as:  PHENERGAN   12.5 mg, Oral, Every 6 Hours PRN         Changes to Medications      Instructions Start Date   omeprazole 40 MG capsule  Commonly known as:  PrilOSEC  What changed:    · medication strength  · how much to take  · when to take this   40 mg, Oral, Daily         Continue  These Medications      Instructions Start Date   albuterol sulfate  (90 Base) MCG/ACT inhaler  Commonly known as:  PROVENTIL HFA;VENTOLIN HFA;PROAIR HFA   No dose, route, or frequency recorded.      baclofen 10 MG tablet  Commonly known as:  LIORESAL   10 mg, Oral, Nightly PRN      CENTRUM ULTRA WOMENS tablet   1 tablet, Oral, Daily      CLARITIN-D 12 HOUR PO   1 tablet, Oral, Daily      fenofibric acid 135 MG capsule delayed-release delayed release capsule  Commonly known as:  TRILIPIX   135 mg, Oral, Daily      IRON PO   65 mg, Oral, Daily, 65 MG ELEMENTAL IRON      lisinopril 10 MG tablet  Commonly known as:  PRINIVIL,ZESTRIL   10 mg, Oral, Daily      [facility] misc   1 EACH BY MISC.(NON-DRUG COMBO ROUTE) ROUTE 4 TIMES DAILY.      Mytonomy capsule   1 capsule, Oral, Daily      vitamin D3 125 MCG (5000 UT) capsule capsule   5,000 Units, Oral, Daily         Stop These Medications    ibuprofen 200 MG tablet  Commonly known as:  ADVIL,MOTRIN     metFORMIN 500 MG tablet  Commonly known as:  GLUCOPHAGE            Discharge instructions:  Per Bariatric manual      Follow-up appointment: Follow up with Bariatric PA in the office in 1 week.

## 2020-03-04 ENCOUNTER — OFFICE VISIT (OUTPATIENT)
Dept: BARIATRICS/WEIGHT MGMT | Facility: CLINIC | Age: 45
End: 2020-03-04

## 2020-03-04 VITALS
RESPIRATION RATE: 18 BRPM | TEMPERATURE: 96.4 F | WEIGHT: 265.5 LBS | DIASTOLIC BLOOD PRESSURE: 64 MMHG | HEART RATE: 79 BPM | SYSTOLIC BLOOD PRESSURE: 120 MMHG | HEIGHT: 64 IN | BODY MASS INDEX: 45.33 KG/M2 | OXYGEN SATURATION: 97 %

## 2020-03-04 DIAGNOSIS — I10 HYPERTENSION, UNSPECIFIED TYPE: ICD-10-CM

## 2020-03-04 DIAGNOSIS — E78.1 HYPERTRIGLYCERIDEMIA: ICD-10-CM

## 2020-03-04 DIAGNOSIS — G47.33 OBSTRUCTIVE SLEEP APNEA SYNDROME: ICD-10-CM

## 2020-03-04 DIAGNOSIS — K21.9 GASTROESOPHAGEAL REFLUX DISEASE, ESOPHAGITIS PRESENCE NOT SPECIFIED: ICD-10-CM

## 2020-03-04 DIAGNOSIS — M54.9 BACK PAIN, UNSPECIFIED BACK LOCATION, UNSPECIFIED BACK PAIN LATERALITY, UNSPECIFIED CHRONICITY: ICD-10-CM

## 2020-03-04 DIAGNOSIS — E66.9 DIABETES MELLITUS TYPE 2 IN OBESE (HCC): ICD-10-CM

## 2020-03-04 DIAGNOSIS — E66.01 OBESITY, CLASS III, BMI 40-49.9 (MORBID OBESITY) (HCC): Primary | ICD-10-CM

## 2020-03-04 DIAGNOSIS — E11.69 DIABETES MELLITUS TYPE 2 IN OBESE (HCC): ICD-10-CM

## 2020-03-04 DIAGNOSIS — K75.81 NASH (NONALCOHOLIC STEATOHEPATITIS): ICD-10-CM

## 2020-03-04 PROCEDURE — 99024 POSTOP FOLLOW-UP VISIT: CPT | Performed by: SURGERY

## 2020-03-04 RX ORDER — URSODIOL 300 MG/1
300 CAPSULE ORAL 2 TIMES DAILY
Qty: 60 CAPSULE | Refills: 5 | Status: SHIPPED | OUTPATIENT
Start: 2020-03-04 | End: 2020-04-03

## 2020-03-04 NOTE — PROGRESS NOTES
[facility] Bariatric Surgery  [address]  [address]  [facility] [address]  [phone]      Patient Name:  Eloise Delatorre.  :  1975      Date of Visit: 3/4/2020      Reason for Visit:  POD #8    HPI:  Eloise Delatorre is a 44 y.o. female s/p 20 LSG/liver biopsy (KAY) by Dr. Guzman.  Discharged with Augmentin for Proteus UTI present on admission. She finished the Augmentin.  No dysuria/LUTS.    Doing well.  No issues/concerns. Denies dysphagia, reflux, nausea, vomiting and abdominal pain.  Tolerating diet progression - on stage 2.  Getting 90-100g prot/day.  Drinking 64 fluid oz/day.  Taking MVI, B12, B1, Calcium, Vit D, iron and Vit C.  On Omeprazole .  Holding ASA , NSAIDs  and Steroids.  Holding metformin.  Ambulating.     Presurgery weight: 281 pounds.  Today's weight is 120 kg (265 lb 8 oz) pounds, today's  Body mass index is 46.29 kg/m²., and her weight loss since surgery is 16 pounds.       Path reviewed with patient:  Final Diagnosis    1. STOMACH, SUBTOTAL GASTRECTOMY:  Parietal cell hyperplasia, gastric mucosa.   2. LIVER, NEEDLE CORE BIOPSY:  Marked steatosis with steatohepatitis.  No increased fibrosis on trichrome stains   No iron deposition identified.         Past Medical History:   Diagnosis Date   • Anemia    • Anxiety     no meds, previously on Zoloft, avoids d/t weight gain   • Asthma     allergy induced, prn inhalers   • Chronic back pain     Baclofen + Ibuprofen, denies steroid injections d/t weight gain   • Dyspepsia     w/ postprandial nausea + abd.pain   • Dyspnea on exertion    • Fatigue    • Fibromyalgia    • Frequent UTI    • GERD (gastroesophageal reflux disease)     BID PPI, denies prior eval   • Hypertension    • Hypertriglyceridemia    • Iron deficiency     in the past   • Morbid obesity (CMS/HCC)    • Psoriasis    • Sleep apnea     PATIENT HAS NEVER HAD A SLEEP STUDY   • Type 2 diabetes mellitus (CMS/HCC)     never on insulin, does not  check FSBS, A1C 11??; RECENTLY TAKEN OFF METFORMIN   • Vitamin D deficiency    • Wears glasses      Past Surgical History:   Procedure Laterality Date   • BACK SURGERY  2012    L4-L5   • BLADDER SUSPENSION  2009    during hysterectomy   • COLONOSCOPY  2015    unremarkable, but w/ hx polyps   • DIAGNOSTIC LAPAROSCOPY  1994    x 8 ([id number]) for endometriosis   • ENDOSCOPY     • ENDOSCOPY N/A 2/25/2020    Procedure: ESOPHAGOGASTRODUODENOSCOPY;  Surgeon: Lila Guzman MD;  Location:  [facility];  Service: Bariatric;  Laterality: N/A;   • GASTRIC SLEEVE LAPAROSCOPIC N/A 2/25/2020    Procedure: GASTRIC SLEEVE LAPAROSCOPIC;  Surgeon: Lila Guzman MD;  Location:  [facility];  Service: Bariatric;  Laterality: N/A;   • LIVER BIOPSY N/A 2/25/2020    Procedure: LIVER BIOPSY LAPAROSCOPIC;  Surgeon: Lila Guzman MD;  Location:  [facility];  Service: Bariatric;  Laterality: N/A;   • TOTAL LAPAROSCOPIC HYSTERECTOMY  2009    for endometriosis   • WISDOM TOOTH EXTRACTION  1992     Outpatient Medications Marked as Taking for the 3/4/20 encounter (Office Visit) with Lila Guzman MD   Medication Sig Dispense Refill   • acetaminophen (TYLENOL 8 HOUR) 650 MG 8 hr tablet Take 1 tablet by mouth Every 6 (Six) Hours. 20 tablet 0   • albuterol sulfate  (90 Base) MCG/ACT inhaler      • baclofen (LIORESAL) 10 MG tablet Take 10 mg by mouth At Night As Needed.     • Cholecalciferol (VITAMIN D3) 125 MCG (5000 UT) capsule capsule Take 5,000 Units by mouth Daily.     • fenofibric acid (TRILIPIX) 135 MG capsule delayed-release delayed release capsule Take 135 mg by mouth Daily.     • IRON PO Take 65 mg by mouth Daily. 65 MG ELEMENTAL IRON     • lisinopril (PRINIVIL,ZESTRIL) 10 MG tablet Take 10 mg by mouth Daily.     • Loratadine-Pseudoephedrine (CLARITIN-D 12 HOUR PO) Take 1 tablet by mouth Daily.     • Multiple Vitamins-Minerals (CENTRUM ULTRA WOMENS) tablet Take 1 tablet by mouth Daily.     • omeprazole  "(PrilOSEC) 40 MG capsule Take 1 capsule by mouth Daily for 60 days. 30 capsule 0   • ondansetron ODT (ZOFRAN ODT) 4 MG disintegrating tablet Take 1 tablet by mouth Every 8 (Eight) Hours As Needed for Nausea or Vomiting. 10 tablet 0   • oxyCODONE (ROXICODONE) 5 MG immediate release tablet Take 1 tablet by mouth Every 6 (Six) Hours As Needed for Moderate Pain . 10 tablet 0   • Probiotic Product (Athenix) capsule Take 1 capsule by mouth Daily.     • promethazine (PHENERGAN) 12.5 MG tablet Take 1 tablet by mouth Every 6 (Six) Hours As Needed for Nausea or Vomiting. 10 tablet 0     No Known Allergies    Social History     Socioeconomic History   • Marital status:      Spouse name: Not on file   • Number of children: Not on file   • Years of education: Not on file   • Highest education level: Not on file   Tobacco Use   • Smoking status: Former Smoker     Packs/day: 3.00     Years: 20.00     Pack years: 60.00     Types: Cigarettes     Last attempt to quit: 2009     Years since quittin.1   • Smokeless tobacco: Never Used   Substance and Sexual Activity   • Alcohol use: No     Frequency: Never   • Drug use: No   • Sexual activity: Not Currently     Partners: Male     Birth control/protection: None     Comment: Hysterectomy   Social History Narrative    Living in [place], [place] ([facility]) w/  and daughter.  On disability since 2019 for back pain.  Formerly worked w/ [employer] x 17 years.       /64 (BP Location: Left arm, Patient Position: Sitting, Cuff Size: Large Adult)   Pulse 79   Temp 96.4 °F (35.8 °C) (Temporal)   Resp 18   Ht 161.3 cm (63.5\")   Wt 120 kg (265 lb 8 oz)   SpO2 97%   BMI 46.29 kg/m²   Physical Exam   Constitutional: She is oriented to person, place, and time. She appears well-developed and well-nourished. No distress.   HENT:   Head: Normocephalic and atraumatic.   Mouth/Throat: No oropharyngeal exudate.   Eyes: Pupils are equal, round, and reactive to light. " Conjunctivae and EOM are normal.   Pulmonary/Chest: Effort normal. No respiratory distress.   Abdominal: Soft. She exhibits no distension.   Incisions are well-healing without induration, erythema, fluctuance, or drainage.       Neurological: She is alert and oriented to person, place, and time. No cranial nerve deficit.   Skin: Skin is warm and dry. She is not diaphoretic. No pallor.   Psychiatric: She has a normal mood and affect. Her behavior is normal. Thought content normal.         Assessment:   POD # 8      Plan:  Doing well. Continue to advance diet per manual.  Increase protein intake to 100g/day.  Actigall Rx given.  Increase exercise/activity as tolerated.  Reviewed lifting restrictions, nothing >25 lbs x 2 more weeks.  Continue vitamins.  Continue PPI.  Continue to avoid ASA/NSAIDs/Steroids x 6 weeks postop.  Call w/ problems/concerns.    The patient was instructed to follow up in 3 weeks, sooner if needed.       Re: liver biopsy showing KAY/fatty liver, will refer to [facility].  Pt instructed to avoid high fructose corn syrup for life!    Lila Guzman MD  Answers for HPI/ROS submitted by the patient on 2/26/2020   What is the primary reason for your visit?: Other  Please describe your symptoms.: Bariatric  Have you had these symptoms before?: No  How long have you been having these symptoms?: Other  Please list any medications you are currently taking for this condition.: In file  Please describe any probable cause for these symptoms. : Sleeve surgery

## 2020-03-12 RX ORDER — FLUCONAZOLE 150 MG/1
150 TABLET ORAL ONCE
Qty: 1 TABLET | Refills: 0 | Status: SHIPPED | OUTPATIENT
Start: 2020-03-12 | End: 2020-03-12

## 2020-03-12 RX ORDER — SULFAMETHOXAZOLE AND TRIMETHOPRIM 800; 160 MG/1; MG/1
1 TABLET ORAL 2 TIMES DAILY
Qty: 14 TABLET | Refills: 0 | Status: SHIPPED | OUTPATIENT
Start: 2020-03-12 | End: 2020-04-01

## 2020-04-01 ENCOUNTER — TELEMEDICINE (OUTPATIENT)
Dept: BARIATRICS/WEIGHT MGMT | Facility: CLINIC | Age: 45
End: 2020-04-01

## 2020-04-01 VITALS — HEIGHT: 64 IN | BODY MASS INDEX: 43.19 KG/M2 | WEIGHT: 253 LBS

## 2020-04-01 DIAGNOSIS — Z98.84 S/P BARIATRIC SURGERY: Primary | ICD-10-CM

## 2020-04-01 PROCEDURE — 99024 POSTOP FOLLOW-UP VISIT: CPT | Performed by: PHYSICIAN ASSISTANT

## 2020-04-01 NOTE — PROGRESS NOTES
[facility] Bariatric Surgery  [address]  [address]  [facility] [id number] [phone]      Patient Name:  Eloise Delatorre  :  1975      Date of Visit: 2020      Reason for Visit:  1 month postop    HPI:  Eloise Delatorre is a 44 y.o. female s/p LSG/liver bx (steatohepatitis) 20 w/ Dr. Guzman.     FINDINGS: Hepatomegaly with microvesicular steatosis.  Technically challenging case with more than normal amount of visceral fat.  No hiatal hernia observed.    Virtual visit today d/t COVID pandemic.  Feeling good.  No concerns.  Denies dysphagia, reflux, nausea, vomiting and abdominal pain.  Tolerating diet progression - eating meats w/out issue.  Getting 70g prot/day.  Still drinking 2 protein shakes/day.  Drinking lots of water o/w.  Continues on Omeprazole + Actigall.  Taking MVI, B12, B1, Calcium, Vit D, iron and Vit C.  Physical activity: limited d/t back pain, but staying very busy around the house, using hand weights when able.     Presurgery weight:  281 pounds. Today's weight is 115 kg (253 lb) pounds, today's Body mass index is 44.11 kg/m²., and weight loss since surgery is 28 pounds.       Past Medical History:   Diagnosis Date   • Anemia    • Anxiety     no meds, previously on Zoloft, avoids d/t weight gain   • Asthma     allergy induced, prn inhalers   • Chronic back pain     Baclofen + Ibuprofen, denies steroid injections d/t weight gain   • Dyspepsia     w/ postprandial nausea + abd.pain   • Dyspnea on exertion    • Fatigue    • Fibromyalgia    • Frequent UTI    • GERD (gastroesophageal reflux disease)     BID PPI, denies prior eval   • Hypertension    • Hypertriglyceridemia    • Iron deficiency     in the past   • Morbid obesity (CMS/HCC)    • Psoriasis    • Sleep apnea     PATIENT HAS NEVER HAD A SLEEP STUDY   • Type 2 diabetes mellitus (CMS/HCC)     never on insulin, does not check FSBS, A1C 11??; RECENTLY TAKEN OFF METFORMIN   • Vitamin D deficiency     • Wears glasses      Past Surgical History:   Procedure Laterality Date   • BACK SURGERY  2012    L4-L5   • BLADDER SUSPENSION  2009    during hysterectomy   • COLONOSCOPY  2015    unremarkable, but w/ hx polyps   • DIAGNOSTIC LAPAROSCOPY  1994    x 8 ([id number]) for endometriosis   • ENDOSCOPY     • ENDOSCOPY N/A 2/25/2020    Procedure: ESOPHAGOGASTRODUODENOSCOPY;  Surgeon: Lila Guzman MD;  Location:  [facility];  Service: Bariatric;  Laterality: N/A;   • GASTRIC SLEEVE LAPAROSCOPIC N/A 2/25/2020    Procedure: GASTRIC SLEEVE LAPAROSCOPIC;  Surgeon: Lila Guzman MD;  Location:  [facility];  Service: Bariatric;  Laterality: N/A;   • LIVER BIOPSY N/A 2/25/2020    Procedure: LIVER BIOPSY LAPAROSCOPIC;  Surgeon: Lila Guzman MD;  Location:  [facility];  Service: Bariatric;  Laterality: N/A;   • TOTAL LAPAROSCOPIC HYSTERECTOMY  2009    for endometriosis   • WISDOM TOOTH EXTRACTION  1992     Outpatient Medications Marked as Taking for the 4/1/20 encounter (Telemedicine) with Latanya Jo PA   Medication Sig Dispense Refill   • acetaminophen (TYLENOL 8 HOUR) 650 MG 8 hr tablet Take 1 tablet by mouth Every 6 (Six) Hours. 20 tablet 0   • albuterol sulfate  (90 Base) MCG/ACT inhaler      • Cholecalciferol (VITAMIN D3) 125 MCG (5000 UT) capsule capsule Take 5,000 Units by mouth Daily.     • fenofibric acid (TRILIPIX) 135 MG capsule delayed-release delayed release capsule Take 135 mg by mouth Daily.     • IRON PO Take 65 mg by mouth Daily. 65 MG ELEMENTAL IRON     • lisinopril (PRINIVIL,ZESTRIL) 10 MG tablet Take 10 mg by mouth Daily.     • Loratadine-Pseudoephedrine (CLARITIN-D 12 HOUR PO) Take 1 tablet by mouth Daily.     • Multiple Vitamins-Minerals (CENTRUM ULTRA WOMENS) tablet Take 1 tablet by mouth Daily.     • omeprazole (PrilOSEC) 40 MG capsule Take 1 capsule by mouth Daily for 60 days. 30 capsule 0   • ursodiol (ACTIGALL) 300 MG capsule Take 1 capsule by mouth 2 (Two) Times a  "Day for 30 days. 60 capsule 5     No Known Allergies    Social History     Socioeconomic History   • Marital status:      Spouse name: Not on file   • Number of children: Not on file   • Years of education: Not on file   • Highest education level: Not on file   Tobacco Use   • Smoking status: Former Smoker     Packs/day: 3.00     Years: 20.00     Pack years: 60.00     Types: Cigarettes     Last attempt to quit: 2009     Years since quittin.2   • Smokeless tobacco: Never Used   Substance and Sexual Activity   • Alcohol use: No     Frequency: Never   • Drug use: No   • Sexual activity: Not Currently     Partners: Male     Birth control/protection: None     Comment: Hysterectomy   Social History Narrative    Living in [place], [place] w/  and daughter.  On disability since 2019 for back pain.  Formerly worked w/ [employer] x 17 years.       Ht 161.3 cm (63.5\")   Wt 115 kg (253 lb)   BMI 44.11 kg/m²     Physical Exam   Constitutional: She is oriented to person, place, and time. She appears well-developed and well-nourished. No distress.   Eyes: No scleral icterus.   Pulmonary/Chest: Effort normal.   Abdominal:   incisions healing well   Neurological: She is alert and oriented to person, place, and time.   Psychiatric: She has a normal mood and affect.         Assessment:  1 month s/p LSG/liver bx (steatohepatitis) 20 w/ Dr. Guzman.       ICD-10-CM ICD-9-CM   1. S/P bariatric surgery Z98.84 V45.86         Plan:  Doing well.  Continue protein >70g/day.  Encouraged good food choices - high protein, low carb.  Continue w/ routine physical activity as able.  Routine bariatric labs deferred.  Continue taking vitamins faithfully.  Continue PPI.  Continue to avoid ASA/NSAIDs/tobacco x 6 weeks postop, steroids x 8 weeks postop.   Call w/ problems/concerns.    The patient was instructed to follow up in 2 months, sooner if needed.     Note: This was an audio and video enabled telemedicine encounter to " comply with social distancing guidelines during the COVID-19 pandemic.  Consent was obtained prior to the visit.

## 2020-04-01 NOTE — PATIENT INSTRUCTIONS
Continue to focus on good food choices and healthy habits.  Continue taking vitamins faithfully.  Call us with any questions/concerns.

## 2020-04-14 RX ORDER — OMEPRAZOLE 40 MG/1
CAPSULE, DELAYED RELEASE ORAL
Qty: 30 CAPSULE | Refills: 0 | Status: SHIPPED | OUTPATIENT
Start: 2020-04-14 | End: 2020-05-07

## 2020-05-07 RX ORDER — OMEPRAZOLE 40 MG/1
CAPSULE, DELAYED RELEASE ORAL
Qty: 30 CAPSULE | Refills: 0 | Status: SHIPPED | OUTPATIENT
Start: 2020-05-07 | End: 2020-06-02

## 2020-05-20 ENCOUNTER — TELEMEDICINE (OUTPATIENT)
Dept: BARIATRICS/WEIGHT MGMT | Facility: CLINIC | Age: 45
End: 2020-05-20

## 2020-05-20 DIAGNOSIS — Z90.3 POSTGASTRECTOMY MALABSORPTION: ICD-10-CM

## 2020-05-20 DIAGNOSIS — E11.69 DIABETES MELLITUS TYPE 2 IN OBESE (HCC): ICD-10-CM

## 2020-05-20 DIAGNOSIS — K91.2 POSTGASTRECTOMY MALABSORPTION: ICD-10-CM

## 2020-05-20 DIAGNOSIS — Z98.84 S/P BARIATRIC SURGERY: ICD-10-CM

## 2020-05-20 DIAGNOSIS — E66.9 DIABETES MELLITUS TYPE 2 IN OBESE (HCC): ICD-10-CM

## 2020-05-20 DIAGNOSIS — J45.909 ASTHMA, UNSPECIFIED ASTHMA SEVERITY, UNSPECIFIED WHETHER COMPLICATED, UNSPECIFIED WHETHER PERSISTENT: ICD-10-CM

## 2020-05-20 DIAGNOSIS — K21.9 GASTROESOPHAGEAL REFLUX DISEASE, ESOPHAGITIS PRESENCE NOT SPECIFIED: ICD-10-CM

## 2020-05-20 DIAGNOSIS — M54.9 BACK PAIN, UNSPECIFIED BACK LOCATION, UNSPECIFIED BACK PAIN LATERALITY, UNSPECIFIED CHRONICITY: ICD-10-CM

## 2020-05-20 DIAGNOSIS — Z98.84 STATUS POST BARIATRIC SURGERY: ICD-10-CM

## 2020-05-20 DIAGNOSIS — K75.81 NASH (NONALCOHOLIC STEATOHEPATITIS): ICD-10-CM

## 2020-05-20 DIAGNOSIS — Z13.0 SCREENING, IRON DEFICIENCY ANEMIA: ICD-10-CM

## 2020-05-20 DIAGNOSIS — I10 HYPERTENSION, UNSPECIFIED TYPE: ICD-10-CM

## 2020-05-20 DIAGNOSIS — Z13.21 MALNUTRITION SCREEN: ICD-10-CM

## 2020-05-20 DIAGNOSIS — G47.33 OBSTRUCTIVE SLEEP APNEA SYNDROME: ICD-10-CM

## 2020-05-20 DIAGNOSIS — R53.83 FATIGUE, UNSPECIFIED TYPE: Primary | ICD-10-CM

## 2020-05-20 DIAGNOSIS — E78.1 HYPERTRIGLYCERIDEMIA: ICD-10-CM

## 2020-05-20 DIAGNOSIS — E55.9 HYPOVITAMINOSIS D: ICD-10-CM

## 2020-05-20 PROCEDURE — 99214 OFFICE O/P EST MOD 30 MIN: CPT | Performed by: SURGERY

## 2020-05-20 NOTE — PROGRESS NOTES
[facility] Bariatric Surgery  [address]  [address]  [facility] [address] [phone]        Patient Name:  Eloise Delatorre.  :  1975      Date of Visit: 2020      Reason for Visit:   3 months postop     HPI: Eloise Delatorre is a 44 y.o. female s/p LSG/liver bx (steatohepatitis) 20 w/ Dr. Guzman.      Doing well.  No major issues/concerns. Denies dysphagia, reflux, nausea, vomiting and abdominal pain.  Getting 60g prot/day including drinks and food.  Drinking 64 fluid oz/day.  1 month labs revealed no vitamin deficiencies. Taking MVI, B12, B1, Calcium, Vit D, iron and Vit C.  On Omeprazole .  Exercise: walks to barn to feed animals.  Lifts weight sometimes.  Some snacking.     Presurgery weight: 281 pounds.  Today's weight is 244 pounds, and weight loss since surgery is 37 pounds.  Denies all calorie drinks.      Past Medical History:   Diagnosis Date   • Anemia    • Anxiety     no meds, previously on Zoloft, avoids d/t weight gain   • Asthma     allergy induced, prn inhalers   • Chronic back pain     Baclofen + Ibuprofen, denies steroid injections d/t weight gain   • Dyspepsia     w/ postprandial nausea + abd.pain   • Dyspnea on exertion    • Fatigue    • Fibromyalgia    • Frequent UTI    • GERD (gastroesophageal reflux disease)     BID PPI, denies prior eval   • Hypertension    • Hypertriglyceridemia    • Iron deficiency     in the past   • Morbid obesity (CMS/HCC)    • Psoriasis    • Sleep apnea     PATIENT HAS NEVER HAD A SLEEP STUDY   • Type 2 diabetes mellitus (CMS/HCC)     never on insulin, does not check FSBS, A1C 11??; RECENTLY TAKEN OFF METFORMIN   • Vitamin D deficiency    • Wears glasses      Past Surgical History:   Procedure Laterality Date   • BACK SURGERY      L4-L5   • BLADDER SUSPENSION      during hysterectomy   • COLONOSCOPY      unremarkable, but w/ hx polyps   • DIAGNOSTIC LAPAROSCOPY  1994    x 8 ([id number]) for endometriosis    • ENDOSCOPY     • ENDOSCOPY N/A 2020    Procedure: ESOPHAGOGASTRODUODENOSCOPY;  Surgeon: Lila Guzman MD;  Location:  [facility];  Service: Bariatric;  Laterality: N/A;   • GASTRIC SLEEVE LAPAROSCOPIC N/A 2020    Procedure: GASTRIC SLEEVE LAPAROSCOPIC;  Surgeon: Lila Guzman MD;  Location:  [facility];  Service: Bariatric;  Laterality: N/A;   • LIVER BIOPSY N/A 2020    Procedure: LIVER BIOPSY LAPAROSCOPIC;  Surgeon: Lila Guzman MD;  Location:  [facility];  Service: Bariatric;  Laterality: N/A;   • TOTAL LAPAROSCOPIC HYSTERECTOMY      for endometriosis   • WISDOM TOOTH EXTRACTION       No outpatient medications have been marked as taking for the 20 encounter (Telemedicine) with Lila Guzman MD.       No Known Allergies    Social History     Socioeconomic History   • Marital status:      Spouse name: Not on file   • Number of children: Not on file   • Years of education: Not on file   • Highest education level: Not on file   Tobacco Use   • Smoking status: Former Smoker     Packs/day: 3.00     Years: 20.00     Pack years: 60.00     Types: Cigarettes     Last attempt to quit:      Years since quittin.3   • Smokeless tobacco: Never Used   Substance and Sexual Activity   • Alcohol use: No     Frequency: Never   • Drug use: No   • Sexual activity: Not Currently     Partners: Male     Birth control/protection: None     Comment: Hysterectomy   Social History Narrative    Living in [place], [place] ([place] w/  and daughter.  On disability since 2019 for back pain.  Formerly worked w/ [employer] x 17 years.       There were no vitals taken for this visit.    Physical Exam   Constitutional: She is oriented to person, place, and time. She appears well-developed and well-nourished. No distress.   HENT:   Head: Normocephalic and atraumatic.   Mouth/Throat: No oropharyngeal exudate.   Eyes: Pupils are equal, round, and reactive to light. Conjunctivae  and EOM are normal. No scleral icterus.   Pulmonary/Chest: Effort normal. No respiratory distress.   Neurological: She is alert and oriented to person, place, and time.   Skin: She is not diaphoretic. No pallor.   Psychiatric: She has a normal mood and affect. Her behavior is normal.         Assessment:  3 months s/p LSG/liver bx (steatohepatitis) 2/25/20 w/ Dr. Guzman.       ICD-10-CM ICD-9-CM   1. Fatigue, unspecified type R53.83 780.79   2. Status post bariatric surgery Z98.84 V45.86   3. Malnutrition screen Z13.21 V77.2   4. 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 Hypovitaminosis D E55.9 268.9   6. Screening, iron deficiency anemia Z13.0 V78.0   7. S/P bariatric surgery Z98.84 V45.86   8. Back pain, unspecified back location, unspecified back pain laterality, unspecified chronicity M54.9 724.5   9. Asthma, unspecified asthma severity, unspecified whether complicated, unspecified whether persistent J45.909 493.90   10. Gastroesophageal reflux disease, esophagitis presence not specified K21.9 530.81   11. Obstructive sleep apnea syndrome G47.33 327.23   12. Diabetes mellitus type 2 in obese (CMS/[facility]) E11.69 250.00    E66.9 278.00   13. KAY (nonalcoholic steatohepatitis) K75.81 571.8   14. Hypertension, unspecified type I10 401.9   15. Hypertriglyceridemia E78.1 272.1         Plan:  Doing well. Continue w/ good food choices and healthy habits.  Continue protein >70g/day.  Continue routine exercise.  Routine bariatric labs ordered.  Continue vitamins w/ adjustments pending lab results.  Call w/ problems/concerns.     This visit was conducted as a video visit, in an effort to limit spread of the novel coronavirus during the 2020 pandemic.      Increase protein.  Log on [employer]  Walk 30 minutes 5 days per week.  [email or web address] daily.      The patient was instructed to follow up in 3 months, sooner if needed.    note: approx 15 of the 25 minute visit was spent counseling on nutrition and necessary  dietary/lifestyle modifications face to face.    Lila Guzman MD

## 2020-06-02 RX ORDER — OMEPRAZOLE 40 MG/1
40 CAPSULE, DELAYED RELEASE ORAL DAILY
Qty: 90 CAPSULE | Refills: 0 | Status: SHIPPED | OUTPATIENT
Start: 2020-06-02

## 2020-06-08 ENCOUNTER — OFFICE VISIT (OUTPATIENT)
Dept: GASTROENTEROLOGY | Facility: CLINIC | Age: 45
End: 2020-06-08

## 2020-06-08 VITALS
BODY MASS INDEX: 40.79 KG/M2 | DIASTOLIC BLOOD PRESSURE: 88 MMHG | WEIGHT: 244.8 LBS | HEIGHT: 65 IN | HEART RATE: 71 BPM | SYSTOLIC BLOOD PRESSURE: 125 MMHG | TEMPERATURE: 98 F

## 2020-06-08 DIAGNOSIS — R74.8 ELEVATED LIVER ENZYMES: ICD-10-CM

## 2020-06-08 DIAGNOSIS — K76.0 HEPATIC STEATOSIS DETERMINED BY BIOPSY OF LIVER: Primary | ICD-10-CM

## 2020-06-08 DIAGNOSIS — E66.01 CLASS 3 SEVERE OBESITY DUE TO EXCESS CALORIES WITHOUT SERIOUS COMORBIDITY WITH BODY MASS INDEX (BMI) OF 40.0 TO 44.9 IN ADULT (HCC): ICD-10-CM

## 2020-06-08 PROCEDURE — 99204 OFFICE O/P NEW MOD 45 MIN: CPT | Performed by: NURSE PRACTITIONER

## 2020-06-08 RX ORDER — AZELASTINE 1 MG/ML
2 SPRAY, METERED NASAL
COMMUNITY
Start: 2018-12-27

## 2020-06-08 NOTE — PROGRESS NOTES
GASTROENTEROLOGY OFFICE NOTE  Eloise Delatorre  [id number]  1975    CARE TEAM  Patient Care Team:  Onofre Butler MD as PCP - General (Family Medicine)  Lila Guzman MD as Surgeon (General Surgery)    Referring Provider: Onofre Butler MD    Chief Complaint   Patient presents with   • Liver Eval     liver bx showed Fatty liver        HISTORY OF PRESENT ILLNESS:  Patient is a 44-year-old female with medical history of hypertension, dyslipidemia, and obesity who underwent a gastric sleeve procedure back on February 25.  Liver biopsy was done at that time that showed marked steatosis >70% with no fibrosis noted.  Liver enzymes are noted to be elevated AST 41//ALP 63.  She is doing well with no liver related complaints and exhibits no stigmata of advanced liver disease.  She has lost about 37 pounds since her gastric sleeve surgery.  She continues to take medications for hypertension and dyslipidemia.    PAST MEDICAL HISTORY  Past Medical History:   Diagnosis Date   • Anemia    • Anxiety     no meds, previously on Zoloft, avoids d/t weight gain   • Asthma     allergy induced, prn inhalers   • Chronic back pain     Baclofen + Ibuprofen, denies steroid injections d/t weight gain   • Dyspepsia     w/ postprandial nausea + abd.pain   • Dyspnea on exertion    • Fatigue    • Fibromyalgia    • Frequent UTI    • GERD (gastroesophageal reflux disease)     BID PPI, denies prior eval   • Hypertension    • Hypertriglyceridemia    • Iron deficiency     in the past   • Morbid obesity (CMS/HCC)    • Psoriasis    • Sleep apnea     PATIENT HAS NEVER HAD A SLEEP STUDY   • Type 2 diabetes mellitus (CMS/HCC)     never on insulin, does not check FSBS, A1C 11??; RECENTLY TAKEN OFF METFORMIN   • Vitamin D deficiency    • Wears glasses         PAST SURGICAL HISTORY  Past Surgical History:   Procedure Laterality Date   • BACK SURGERY  2012    L4-L5   • BLADDER SUSPENSION  2009    during  hysterectomy   • COLONOSCOPY  2015    unremarkable, but w/ hx polyps   • DIAGNOSTIC LAPAROSCOPY  1994    x 8 ([id number]) for endometriosis   • ENDOSCOPY     • ENDOSCOPY N/A 2/25/2020    Procedure: ESOPHAGOGASTRODUODENOSCOPY;  Surgeon: Lila Guzman MD;  Location:  [facility];  Service: Bariatric;  Laterality: N/A;   • GASTRIC SLEEVE LAPAROSCOPIC N/A 2/25/2020    Procedure: GASTRIC SLEEVE LAPAROSCOPIC;  Surgeon: Lila Guzman MD;  Location:  [facility];  Service: Bariatric;  Laterality: N/A;   • LIVER BIOPSY N/A 2/25/2020    Procedure: LIVER BIOPSY LAPAROSCOPIC;  Surgeon: Lila Guzman MD;  Location:  [facility];  Service: Bariatric;  Laterality: N/A;   • TOTAL LAPAROSCOPIC HYSTERECTOMY  2009    for endometriosis   • WISDOM TOOTH EXTRACTION  1992        MEDICATIONS:    Current Outpatient Medications:   •  albuterol sulfate  (90 Base) MCG/ACT inhaler, , Disp: , Rfl:   •  azelastine (ASTELIN) 0.1 % nasal spray, 2 sprays into the nostril(s) as directed by provider., Disp: , Rfl:   •  Cholecalciferol (VITAMIN D3) 125 MCG (5000 UT) capsule capsule, Take 5,000 Units by mouth Daily., Disp: , Rfl:   •  fenofibric acid (TRILIPIX) 135 MG capsule delayed-release delayed release capsule, Take 135 mg by mouth Daily., Disp: , Rfl:   •  IRON PO, Take 65 mg by mouth Daily. 65 MG ELEMENTAL IRON, Disp: , Rfl:   •  lisinopril (PRINIVIL,ZESTRIL) 10 MG tablet, Take 10 mg by mouth Daily., Disp: , Rfl:   •  Loratadine-Pseudoephedrine (CLARITIN-D 12 HOUR PO), Take 1 tablet by mouth Daily., Disp: , Rfl:   •  Multiple Vitamins-Minerals (CENTRUM ULTRA WOMENS) tablet, Take 1 tablet by mouth Daily., Disp: , Rfl:   •  omeprazole (priLOSEC) 40 MG capsule, Take 1 capsule by mouth Daily., Disp: 90 capsule, Rfl: 0  •  Probiotic Product (Arlettie) capsule, Take 1 capsule by mouth Daily., Disp: , Rfl:   •  acetaminophen (TYLENOL 8 HOUR) 650 MG 8 hr tablet, Take 1 tablet by mouth Every 6 (Six) Hours., Disp: 20  tablet, Rfl: 0  •  ondansetron ODT (ZOFRAN ODT) 4 MG disintegrating tablet, Take 1 tablet by mouth Every 8 (Eight) Hours As Needed for Nausea or Vomiting., Disp: 10 tablet, Rfl: 0  •  promethazine (PHENERGAN) 12.5 MG tablet, Take 1 tablet by mouth Every 6 (Six) Hours As Needed for Nausea or Vomiting., Disp: 10 tablet, Rfl: 0    ALLERGIES  No Known Allergies    FAMILY HISTORY:  Family History   Problem Relation Age of Onset   • Obesity Mother    • Hypertension Mother    • Diabetes Mother    • Obesity Father    • Hypertension Father    • Heart attack Father    • Aneurysm Father    • Obesity Maternal Grandmother    • Diabetes Maternal Grandmother    • Hypertension Maternal Grandmother    • Colon cancer Maternal Grandfather 87   • Obesity Maternal Grandfather    • Hypertension Maternal Grandfather    • Obesity Paternal Grandmother    • Hypertension Paternal Grandmother    • Obesity Paternal Grandfather    • Hypertension Paternal Grandfather        SOCIAL HISTORY  Social History     Socioeconomic History   • Marital status:      Spouse name: Not on file   • Number of children: Not on file   • Years of education: Not on file   • Highest education level: Not on file   Tobacco Use   • Smoking status: Former Smoker     Packs/day: 3.00     Years: 20.00     Pack years: 60.00     Types: Cigarettes     Last attempt to quit: 2009     Years since quittin.4   • Smokeless tobacco: Never Used   Substance and Sexual Activity   • Alcohol use: No     Frequency: Never   • Drug use: No   • Sexual activity: Not Currently     Partners: Male     Birth control/protection: None     Comment: Hysterectomy   Social History Narrative    Living in [place], [place] w/  and daughter.  On disability since 2019 for back pain.  Formerly worked w/ [employer] x 17 years.       REVIEW OF SYSTEMS  Review of Systems   Constitutional: Negative for activity change, appetite change, chills, diaphoresis, fatigue, fever, unexpected weight gain  "and unexpected weight loss.   HENT: Negative for congestion, dental problem, drooling, ear discharge, ear pain, facial swelling, hearing loss, mouth sores, nosebleeds, postnasal drip, rhinorrhea, sinus pressure, sneezing, sore throat, swollen glands, tinnitus, trouble swallowing and voice change.    Respiratory: Negative for apnea, cough, choking, chest tightness, shortness of breath, wheezing and stridor.    Cardiovascular: Negative for chest pain, palpitations and leg swelling.   Gastrointestinal: Negative for abdominal distention, abdominal pain, anal bleeding, blood in stool, constipation, diarrhea, nausea, rectal pain, vomiting, GERD and indigestion.   Endocrine: Negative for cold intolerance, heat intolerance, polydipsia, polyphagia and polyuria.   Musculoskeletal: Negative for arthralgias, back pain, gait problem, joint swelling, myalgias, neck pain, neck stiffness and bursitis.   Skin: Negative for color change, dry skin, rash and skin lesions.   Allergic/Immunologic: Negative for environmental allergies, food allergies and immunocompromised state.   Neurological: Negative for dizziness, tremors, seizures, syncope, facial asymmetry, speech difficulty, weakness, light-headedness, numbness, headache, memory problem and confusion.   Hematological: Negative for adenopathy. Does not bruise/bleed easily.   Psychiatric/Behavioral: Negative for agitation, behavioral problems, decreased concentration, dysphoric mood, hallucinations, self-injury, sleep disturbance, suicidal ideas, negative for hyperactivity, depressed mood and stress. The patient is not nervous/anxious.      PHYSICAL EXAM   /88   Pulse 71   Temp 98 °F (36.7 °C)   Ht 165.1 cm (65\")   Wt 111 kg (244 lb 12.8 oz)   BMI 40.74 kg/m²   Physical Exam   Constitutional: She is oriented to person, place, and time. She appears well-developed and well-nourished.   HENT:   Head: Normocephalic.   Mouth/Throat: Oropharynx is clear and moist.   Eyes: Pupils " are equal, round, and reactive to light. EOM are normal.   Neck: Normal range of motion. Neck supple.   Cardiovascular: Normal rate and regular rhythm.   Pulmonary/Chest: Effort normal and breath sounds normal. She has no wheezes. She has no rales.   Abdominal: Soft. Bowel sounds are normal. She exhibits no mass. There is no tenderness. There is no rebound and no guarding. No hernia.   Musculoskeletal: Normal range of motion.   Neurological: She is alert and oriented to person, place, and time. No cranial nerve deficit.   Skin: Skin is warm and dry.   Psychiatric: She has a normal mood and affect. Her behavior is normal. Judgment normal.   Nursing note and vitals reviewed.    Results Review:  [facility] records reviewed and discussed with patient.     ASSESSMENT / PLAN  1.  Hepatic steatosis  2.  Elevated liver enzymes    -Fatty liver disease; risk factors include-obesity, HTN, dyslipidemia  -Liver biopsy: Marked steatosis, no fibrosis  - Labs: AST 41//ALP 63  - Body mass index is 40.74 kg/m².  - denies alcohol and recreational drug use; denies viral hepatitis risk factors; denies use of hepatotoxins    -Weight loss generally reduces HS, achieved either by hypocaloric diet alone or in conjunction with increased physical activity. A combination of a hypocaloric diet (daily reduction by 500-1,000 kcal) and moderate-intensity exercise is likely to provide the best likelihood of sustaining weight loss over time.  Weight loss of at least 3%-5% of body weight within 6 months and maintaining weight loss appears necessary to improve steatosis.      - Monitoring for fibrosis--For patients who achieve their weight loss goals and have normal serum aminotransferases, recommend obtain noninvasive assessment every four years (Fibroscan, US with elastography,or serum fibrosis markers); If the noninvasive assessment shows a low risk fibrosis score (=F1), we continue monitoring patients every four years (if weight loss was  achieved and maintained).   - If the noninvasive assessment shows an increased, high risk fibrosis score (=F2), patient to return for follow up with hepatology.     3. Obesity  - Body mass index is 40.74 kg/m².  - complicates all aspects of care  Plan  - encouraged diet, exercise, and weight loss    4.  Hypertension  5.  Dyslipidemia  -Continue to follow with PCP for control of metabolic disorders    Return in about 1 year (around 6/8/2021).    I discussed the patients findings and my recommendations with patient    GIANA Zhang

## 2020-07-13 RX ORDER — URSODIOL 300 MG/1
300 CAPSULE ORAL 2 TIMES DAILY
Qty: 180 CAPSULE | Refills: 1 | OUTPATIENT
Start: 2020-07-13 | End: 2020-08-12

## 2020-09-14 RX ORDER — URSODIOL 300 MG/1
300 CAPSULE ORAL 2 TIMES DAILY
Qty: 180 CAPSULE | Refills: 1 | OUTPATIENT
Start: 2020-09-14 | End: 2020-10-14

## 2021-02-24 ENCOUNTER — TELEMEDICINE (OUTPATIENT)
Dept: BARIATRICS/WEIGHT MGMT | Facility: CLINIC | Age: 46
End: 2021-02-24

## 2021-02-24 VITALS — HEIGHT: 63 IN | WEIGHT: 237 LBS | BODY MASS INDEX: 41.99 KG/M2

## 2021-02-24 DIAGNOSIS — Z13.0 SCREENING, IRON DEFICIENCY ANEMIA: ICD-10-CM

## 2021-02-24 DIAGNOSIS — E55.9 HYPOVITAMINOSIS D: ICD-10-CM

## 2021-02-24 DIAGNOSIS — Z90.3 POSTGASTRECTOMY MALABSORPTION: ICD-10-CM

## 2021-02-24 DIAGNOSIS — R53.83 FATIGUE, UNSPECIFIED TYPE: Primary | ICD-10-CM

## 2021-02-24 DIAGNOSIS — Z13.21 MALNUTRITION SCREEN: ICD-10-CM

## 2021-02-24 DIAGNOSIS — K91.2 POSTGASTRECTOMY MALABSORPTION: ICD-10-CM

## 2021-02-24 PROCEDURE — 99213 OFFICE O/P EST LOW 20 MIN: CPT | Performed by: SURGERY

## 2021-02-24 NOTE — PROGRESS NOTES
"[facility] Bariatric Surgery  [address]  [address]  [facility] [id number] [phone]        Patient Name:  Eloise Delatorre.  :  1975      Date of Visit: 2021      Reason for Visit:   1 year postop      HPI: Eloise Delatorre is a 45 y.o. female s/p LSG/liver bx (steatohepatitis) 20 w/ Dr. Guzman.     LOV 3 months post op in 2020.  Has not been seen due to \"didn't know she was supposed to see me.\"    Doing well.  No issues/concerns. Denies dysphagia, reflux, nausea, vomiting and abdominal pain.  Getting ?? g prot/day.  Eats a lot of fish, chicken, eggs.  Does not track intake.    Drinking 64 fluid oz/day.  She has never had labs since surgery. Taking MVI pill, and biotin/iron/D3 PatchMD patches. On Omeprazole and obtains refills per PCP.  Exercise: lifts weights on couch.  Limited by back pain.  Admits to grazing.  Denies calorie drinks.       Presurgery weight: 281 pounds.  Today's weight is 108 kg (237 lb) pounds, today's  Body mass index is 41.98 kg/m²., and weight loss since surgery is 44 pounds.      Past Medical History:   Diagnosis Date   • Anemia    • Anxiety     no meds, previously on Zoloft, avoids d/t weight gain   • Asthma     allergy induced, prn inhalers   • Chronic back pain     Baclofen + Ibuprofen, denies steroid injections d/t weight gain   • Dyspepsia     w/ postprandial nausea + abd.pain   • Dyspnea on exertion    • Fatigue    • Fibromyalgia    • Frequent UTI    • GERD (gastroesophageal reflux disease)     BID PPI, denies prior eval   • Hypertension    • Hypertriglyceridemia    • Iron deficiency     in the past   • Morbid obesity (CMS/HCC)    • Psoriasis    • Sleep apnea     PATIENT HAS NEVER HAD A SLEEP STUDY   • Type 2 diabetes mellitus (CMS/HCC)     never on insulin, does not check FSBS, A1C 11??; RECENTLY TAKEN OFF METFORMIN   • Vitamin D deficiency    • Wears glasses      Past Surgical History:   Procedure Laterality Date   • BACK SURGERY " " 2012    L4-L5   • BLADDER SUSPENSION      during hysterectomy   • COLONOSCOPY      unremarkable, but w/ hx polyps   • DIAGNOSTIC LAPAROSCOPY  1994    x 8 (5964-0366) for endometriosis   • ENDOSCOPY     • ENDOSCOPY N/A 2020    Procedure: ESOPHAGOGASTRODUODENOSCOPY;  Surgeon: Lila Guzman MD;  Location:  [facility];  Service: Bariatric;  Laterality: N/A;   • GASTRIC SLEEVE LAPAROSCOPIC N/A 2020    Procedure: GASTRIC SLEEVE LAPAROSCOPIC;  Surgeon: Lila Guzman MD;  Location:  [facility];  Service: Bariatric;  Laterality: N/A;   • LIVER BIOPSY N/A 2020    Procedure: LIVER BIOPSY LAPAROSCOPIC;  Surgeon: Lila Guzman MD;  Location:  [facility];  Service: Bariatric;  Laterality: N/A;   • TOTAL LAPAROSCOPIC HYSTERECTOMY      for endometriosis   • WISDOM TOOTH EXTRACTION       No outpatient medications have been marked as taking for the 21 encounter (Telemedicine) with Lila Guzman MD.       No Known Allergies    Social History     Socioeconomic History   • Marital status:      Spouse name: Not on file   • Number of children: Not on file   • Years of education: Not on file   • Highest education level: Not on file   Tobacco Use   • Smoking status: Former Smoker     Packs/day: 3.00     Years: 20.00     Pack years: 60.00     Types: Cigarettes     Quit date:      Years since quittin.1   • Smokeless tobacco: Never Used   Substance and Sexual Activity   • Alcohol use: No     Frequency: Never   • Drug use: No   • Sexual activity: Not Currently     Partners: Male     Birth control/protection: None     Comment: Hysterectomy   Social History Narrative    Living in [place], [place] w/  and daughter.  On disability since 2019 for back pain.  Formerly worked w/ [employer] x 17 years.       Ht 160 cm (63\")   Wt 108 kg (237 lb)   BMI 41.98 kg/m²     Physical Exam  Constitutional:       General: She is not in acute distress.     Appearance: " Normal appearance. She is not ill-appearing.   HENT:      Head: Normocephalic and atraumatic.      Nose: Nose normal.   Eyes:      General: No scleral icterus.     Extraocular Movements: Extraocular movements intact.      Conjunctiva/sclera: Conjunctivae normal.      Pupils: Pupils are equal, round, and reactive to light.   Pulmonary:      Effort: Pulmonary effort is normal. No respiratory distress.   Skin:     Coloration: Skin is not pale.   Neurological:      Mental Status: She is alert and oriented to person, place, and time.   Psychiatric:         Mood and Affect: Mood normal.         Behavior: Behavior normal.           Assessment:  1 year s/p LSG/liver bx (steatohepatitis) 2/25/20 w/ Dr. Guzman.     ICD-10-CM ICD-9-CM   1. Fatigue, unspecified type  R53.83 780.79   2. Hypovitaminosis D  E55.9 268.9   3. Malnutrition screen  Z13.21 V77.2   4. Screening, iron deficiency anemia  Z13.0 V78.0   5. Postgastrectomy malabsorption  K91.2 579.3    Z90.3          Plan:  Doing well. Continue w/ good food choices and healthy habits.  Continue protein >70g/day.  Continue routine exercise.  Routine bariatric labs ordered.  Continue vitamins w/ adjustments pending lab results.  Call w/ problems/concerns.     Advised track intake, 100 g/day protein, 1300 ramu/day.  Start doing arm cycle 30 minute 3-5 days per week.  Weight training on the couch, seeing muscle definition.  Continue strength.    The patient was instructed to follow up in 6 months, sooner if needed.    Pt reeducated on the need to follow with her Bariatric Surgeon.    This visit was conducted as a video visit, in an effort to limit spread of the novel coronavirus during the 3671-9723 pandemic.  The patient gave consent.    Lila Guzman MD

## 2021-08-30 ENCOUNTER — TELEMEDICINE (OUTPATIENT)
Dept: BARIATRICS/WEIGHT MGMT | Facility: CLINIC | Age: 46
End: 2021-08-30

## 2021-08-30 VITALS — BODY MASS INDEX: 37.82 KG/M2 | WEIGHT: 227 LBS | HEIGHT: 65 IN

## 2021-08-30 DIAGNOSIS — E66.9 OBESITY, CLASS II, BMI 35-39.9: ICD-10-CM

## 2021-08-30 DIAGNOSIS — Z13.21 MALNUTRITION SCREEN: ICD-10-CM

## 2021-08-30 DIAGNOSIS — K21.9 GASTROESOPHAGEAL REFLUX DISEASE, UNSPECIFIED WHETHER ESOPHAGITIS PRESENT: ICD-10-CM

## 2021-08-30 DIAGNOSIS — K91.2 POSTGASTRECTOMY MALABSORPTION: ICD-10-CM

## 2021-08-30 DIAGNOSIS — Z13.0 SCREENING, IRON DEFICIENCY ANEMIA: ICD-10-CM

## 2021-08-30 DIAGNOSIS — R53.83 FATIGUE, UNSPECIFIED TYPE: Primary | ICD-10-CM

## 2021-08-30 DIAGNOSIS — E55.9 HYPOVITAMINOSIS D: ICD-10-CM

## 2021-08-30 DIAGNOSIS — Z90.3 POSTGASTRECTOMY MALABSORPTION: ICD-10-CM

## 2021-08-30 PROCEDURE — 99442 PR PHYS/QHP TELEPHONE EVALUATION 11-20 MIN: CPT | Performed by: SURGERY

## 2021-08-30 NOTE — PROGRESS NOTES
[facility] Bariatric Surgery  [address]  [address]  [facility] [id number] [phone]        Patient Name:  Eloise Delatorre.  :  1975      Date of Visit: 2021      Reason for Visit:   18 months postop      HPI: Eloise Delatorre is a 46 y.o. female s/p LSG/liver bx (steatohepatitis) 20 w/ Dr. Guzman      Only post op appointments have been 1 month and 12 months post op.    Has had a burning sensation in epigastrium LUQ x a few months.  Continues with Prilsoec 40 mg daily.  Symptoms are intermittent.  Worse with drinking too much.  Reflux is controlled by PPI.    Getting ?? g prot/day.  Does not track.  Drinking 64 fluid oz/day. Last labs revealed  no vitamin deficiencies. Taking Patches: PATCHAID MVI and iron..  On Omeprazole .  Exercise: walking and hand weights.     Presurgery weight: 281 pounds.  Today's weight is 103 kg (227 lb) pounds, today's  Body mass index is 38.36 kg/m²., and@ weight loss since surgery is 54 pounds.      Past Medical History:   Diagnosis Date   • Anemia    • Anxiety     no meds, previously on Zoloft, avoids d/t weight gain   • Asthma     allergy induced, prn inhalers   • Chronic back pain     Baclofen + Ibuprofen, denies steroid injections d/t weight gain   • Dyspepsia     w/ postprandial nausea + abd.pain   • Dyspnea on exertion    • Fatigue    • Fibromyalgia    • Frequent UTI    • GERD (gastroesophageal reflux disease)     BID PPI, denies prior eval   • Hypertension    • Hypertriglyceridemia    • Iron deficiency     in the past   • Morbid obesity (CMS/HCC)    • Psoriasis    • Sleep apnea     PATIENT HAS NEVER HAD A SLEEP STUDY   • Type 2 diabetes mellitus (CMS/HCC)     never on insulin, does not check FSBS, A1C 11??; RECENTLY TAKEN OFF METFORMIN   • Vitamin D deficiency    • Wears glasses      Past Surgical History:   Procedure Laterality Date   • BACK SURGERY      L4-L5   • BLADDER SUSPENSION      during hysterectomy   •  "COLONOSCOPY      unremarkable, but w/ hx polyps   • DIAGNOSTIC LAPAROSCOPY  1994    x 8 (2021-0927) for endometriosis   • ENDOSCOPY     • ENDOSCOPY N/A 2020    Procedure: ESOPHAGOGASTRODUODENOSCOPY;  Surgeon: Lila Guzman MD;  Location:  [facility];  Service: Bariatric;  Laterality: N/A;   • GASTRIC SLEEVE LAPAROSCOPIC N/A 2020    Procedure: GASTRIC SLEEVE LAPAROSCOPIC;  Surgeon: Lila Guzman MD;  Location:  [facility];  Service: Bariatric;  Laterality: N/A;   • LIVER BIOPSY N/A 2020    Procedure: LIVER BIOPSY LAPAROSCOPIC;  Surgeon: Lila Guzman MD;  Location:  [facility];  Service: Bariatric;  Laterality: N/A;   • TOTAL LAPAROSCOPIC HYSTERECTOMY      for endometriosis   • WISDOM TOOTH EXTRACTION       No outpatient medications have been marked as taking for the 21 encounter (Telemedicine) with Lila Guzman MD.       No Known Allergies    Social History     Socioeconomic History   • Marital status:      Spouse name: Not on file   • Number of children: Not on file   • Years of education: Not on file   • Highest education level: Not on file   Tobacco Use   • Smoking status: Former Smoker     Packs/day: 3.00     Years: 20.00     Pack years: 60.00     Types: Cigarettes     Quit date:      Years since quittin.6   • Smokeless tobacco: Never Used   Substance and Sexual Activity   • Alcohol use: No   • Drug use: No   • Sexual activity: Not Currently     Partners: Male     Birth control/protection: None     Comment: Hysterectomy       Ht 163.8 cm (64.5\")   Wt 103 kg (227 lb)   BMI 38.36 kg/m²     Physical Exam deferred due to phone visit.      Assessment: 18 months s/p LSG/liver bx (steatohepatitis) 20 w/ Dr. Guzman    ICD-10-CM ICD-9-CM   1. Fatigue, unspecified type  R53.83 780.79   2. Hypovitaminosis D  E55.9 268.9   3. Malnutrition screen  Z13.21 V77.2   4. Screening, iron deficiency anemia  Z13.0 V78.0   5. Postgastrectomy " malabsorption  K91.2 579.3    Z90.3    6. Gastroesophageal reflux disease, unspecified whether esophagitis present  K21.9 530.81   7. Obesity, Class II, BMI 35-39.9  E66.9 278.00         Plan:  Doing well. Continue w/ good food choices and healthy habits.  Continue protein >70g/day.  Continue routine exercise.  Routine bariatric labs ordered.  Continue vitamins w/ adjustments pending lab results.  Call w/ problems/concerns.     The patient was instructed to follow up in 6 months, sooner if needed.  We can discuss revision at that time if she is interested.    Refer to exercise physiologist and [facility].    Listen to hunger and satiety signals: I think she may be drinking too much at a time and feels the burning.      Note: This was an audio only telemedicine encounter to comply with social distancing guidelines during the COVID-19 pandemic - patient was unable to connect to video d/t [employer] access issue.  Consent was obtained prior to the visit.  Total time spent in discussion was 15  minutes.       Lila Guzman MD

## 2021-09-01 ENCOUNTER — LAB (OUTPATIENT)
Dept: LAB | Facility: HOSPITAL | Age: 46
End: 2021-09-01

## 2021-09-01 DIAGNOSIS — K21.9 GASTROESOPHAGEAL REFLUX DISEASE, UNSPECIFIED WHETHER ESOPHAGITIS PRESENT: ICD-10-CM

## 2021-09-01 DIAGNOSIS — R53.83 FATIGUE, UNSPECIFIED TYPE: ICD-10-CM

## 2021-09-01 DIAGNOSIS — Z13.21 MALNUTRITION SCREEN: ICD-10-CM

## 2021-09-01 DIAGNOSIS — K91.2 POSTGASTRECTOMY MALABSORPTION: ICD-10-CM

## 2021-09-01 DIAGNOSIS — E55.9 HYPOVITAMINOSIS D: ICD-10-CM

## 2021-09-01 DIAGNOSIS — Z13.0 SCREENING, IRON DEFICIENCY ANEMIA: ICD-10-CM

## 2021-09-01 DIAGNOSIS — Z90.3 POSTGASTRECTOMY MALABSORPTION: ICD-10-CM

## 2021-09-01 LAB
BASOPHILS # BLD AUTO: 0.08 10*3/MM3 (ref 0–0.2)
BASOPHILS NFR BLD AUTO: 0.7 % (ref 0–1.5)
DEPRECATED RDW RBC AUTO: 45 FL (ref 37–54)
EOSINOPHIL # BLD AUTO: 0.14 10*3/MM3 (ref 0–0.4)
EOSINOPHIL NFR BLD AUTO: 1.2 % (ref 0.3–6.2)
ERYTHROCYTE [DISTWIDTH] IN BLOOD BY AUTOMATED COUNT: 14.8 % (ref 12.3–15.4)
FERRITIN SERPL-MCNC: 81.4 NG/ML (ref 13–150)
HCT VFR BLD AUTO: 42.1 % (ref 34–46.6)
HGB BLD-MCNC: 13.3 G/DL (ref 12–15.9)
IRON 24H UR-MRATE: 42 MCG/DL (ref 37–145)
LYMPHOCYTES # BLD AUTO: 3.34 10*3/MM3 (ref 0.7–3.1)
LYMPHOCYTES NFR BLD AUTO: 27.6 % (ref 19.6–45.3)
MCH RBC QN AUTO: 26.8 PG (ref 26.6–33)
MCHC RBC AUTO-ENTMCNC: 31.6 G/DL (ref 31.5–35.7)
MCV RBC AUTO: 84.9 FL (ref 79–97)
MONOCYTES # BLD AUTO: 1.37 10*3/MM3 (ref 0.1–0.9)
MONOCYTES NFR BLD AUTO: 11.3 % (ref 5–12)
NEUTROPHILS NFR BLD AUTO: 58.2 % (ref 42.7–76)
NEUTROPHILS NFR BLD AUTO: 7.06 10*3/MM3 (ref 1.7–7)
PLATELET # BLD AUTO: 289 10*3/MM3 (ref 140–450)
PMV BLD AUTO: 13.3 FL (ref 6–12)
PREALB SERPL-MCNC: 32.8 MG/DL (ref 20–40)
RBC # BLD AUTO: 4.96 10*6/MM3 (ref 3.77–5.28)
WBC # BLD AUTO: 12.11 10*3/MM3 (ref 3.4–10.8)

## 2021-09-01 PROCEDURE — 80053 COMPREHEN METABOLIC PANEL: CPT

## 2021-09-01 PROCEDURE — 83921 ORGANIC ACID SINGLE QUANT: CPT

## 2021-09-01 PROCEDURE — 36415 COLL VENOUS BLD VENIPUNCTURE: CPT

## 2021-09-01 PROCEDURE — 82746 ASSAY OF FOLIC ACID SERUM: CPT

## 2021-09-01 PROCEDURE — 82728 ASSAY OF FERRITIN: CPT

## 2021-09-01 PROCEDURE — 84134 ASSAY OF PREALBUMIN: CPT

## 2021-09-01 PROCEDURE — 84425 ASSAY OF VITAMIN B-1: CPT

## 2021-09-01 PROCEDURE — 83540 ASSAY OF IRON: CPT

## 2021-09-01 PROCEDURE — 82306 VITAMIN D 25 HYDROXY: CPT

## 2021-09-01 PROCEDURE — 85025 COMPLETE CBC W/AUTO DIFF WBC: CPT

## 2021-09-02 ENCOUNTER — TELEPHONE (OUTPATIENT)
Dept: BARIATRICS/WEIGHT MGMT | Facility: CLINIC | Age: 46
End: 2021-09-02

## 2021-09-02 LAB
25(OH)D3 SERPL-MCNC: 57.3 NG/ML
ALBUMIN SERPL-MCNC: 4.4 G/DL (ref 3.5–5.2)
ALBUMIN/GLOB SERPL: 2.2 G/DL
ALP SERPL-CCNC: 71 U/L (ref 39–117)
ALT SERPL W P-5'-P-CCNC: 22 U/L (ref 1–33)
ANION GAP SERPL CALCULATED.3IONS-SCNC: 8.1 MMOL/L (ref 5–15)
AST SERPL-CCNC: 15 U/L (ref 1–32)
BILIRUB SERPL-MCNC: <0.2 MG/DL (ref 0–1.2)
BUN SERPL-MCNC: 16 MG/DL (ref 6–20)
BUN/CREAT SERPL: 17.8 (ref 7–25)
CALCIUM SPEC-SCNC: 9.1 MG/DL (ref 8.6–10.5)
CHLORIDE SERPL-SCNC: 109 MMOL/L (ref 98–107)
CO2 SERPL-SCNC: 22.9 MMOL/L (ref 22–29)
CREAT SERPL-MCNC: 0.9 MG/DL (ref 0.57–1)
FOLATE SERPL-MCNC: 12.2 NG/ML (ref 4.78–24.2)
GFR SERPL CREATININE-BSD FRML MDRD: 67 ML/MIN/1.73
GLOBULIN UR ELPH-MCNC: 2 GM/DL
GLUCOSE SERPL-MCNC: 48 MG/DL (ref 65–99)
POTASSIUM SERPL-SCNC: 3.9 MMOL/L (ref 3.5–5.2)
PROT SERPL-MCNC: 6.4 G/DL (ref 6–8.5)
SODIUM SERPL-SCNC: 140 MMOL/L (ref 136–145)

## 2021-09-05 LAB
Lab: NORMAL
METHYLMALONATE SERPL-SCNC: 111 NMOL/L (ref 0–378)
VIT B1 BLD-SCNC: 148 NMOL/L (ref 66.5–200)

## 2021-10-19 ENCOUNTER — OFFICE VISIT (OUTPATIENT)
Dept: BARIATRICS/WEIGHT MGMT | Facility: CLINIC | Age: 46
End: 2021-10-19

## 2021-10-19 VITALS
WEIGHT: 227.5 LBS | HEIGHT: 65 IN | RESPIRATION RATE: 18 BRPM | TEMPERATURE: 97.5 F | SYSTOLIC BLOOD PRESSURE: 138 MMHG | OXYGEN SATURATION: 98 % | DIASTOLIC BLOOD PRESSURE: 88 MMHG | BODY MASS INDEX: 37.9 KG/M2 | HEART RATE: 88 BPM

## 2021-10-19 DIAGNOSIS — G47.30 SLEEP APNEA, UNSPECIFIED TYPE: ICD-10-CM

## 2021-10-19 DIAGNOSIS — E66.01 MORBID OBESITY (HCC): Primary | ICD-10-CM

## 2021-10-19 DIAGNOSIS — E11.9 TYPE 2 DIABETES MELLITUS WITHOUT COMPLICATION, WITHOUT LONG-TERM CURRENT USE OF INSULIN (HCC): ICD-10-CM

## 2021-10-19 DIAGNOSIS — J45.909 ASTHMA, UNSPECIFIED ASTHMA SEVERITY, UNSPECIFIED WHETHER COMPLICATED, UNSPECIFIED WHETHER PERSISTENT: ICD-10-CM

## 2021-10-19 DIAGNOSIS — M54.9 CHRONIC BACK PAIN, UNSPECIFIED BACK LOCATION, UNSPECIFIED BACK PAIN LATERALITY: ICD-10-CM

## 2021-10-19 DIAGNOSIS — E78.1 HYPERTRIGLYCERIDEMIA: ICD-10-CM

## 2021-10-19 DIAGNOSIS — I10 HYPERTENSION, UNSPECIFIED TYPE: ICD-10-CM

## 2021-10-19 DIAGNOSIS — K21.9 GASTROESOPHAGEAL REFLUX DISEASE, UNSPECIFIED WHETHER ESOPHAGITIS PRESENT: ICD-10-CM

## 2021-10-19 DIAGNOSIS — Z51.81 ENCOUNTER FOR THERAPEUTIC DRUG LEVEL MONITORING: ICD-10-CM

## 2021-10-19 DIAGNOSIS — G89.29 CHRONIC BACK PAIN, UNSPECIFIED BACK LOCATION, UNSPECIFIED BACK PAIN LATERALITY: ICD-10-CM

## 2021-10-19 PROCEDURE — 99215 OFFICE O/P EST HI 40 MIN: CPT | Performed by: NURSE PRACTITIONER

## 2021-10-19 RX ORDER — METFORMIN HYDROCHLORIDE 500 MG/1
500 TABLET, EXTENDED RELEASE ORAL DAILY
COMMUNITY
Start: 2021-10-04 | End: 2022-01-25 | Stop reason: SDUPTHER

## 2021-10-19 RX ORDER — LIRAGLUTIDE 6 MG/ML
1.8 INJECTION SUBCUTANEOUS DAILY
COMMUNITY
Start: 2021-10-15 | End: 2021-10-19 | Stop reason: ALTCHOICE

## 2021-10-19 RX ORDER — ATORVASTATIN CALCIUM 10 MG/1
10 TABLET, FILM COATED ORAL DAILY
COMMUNITY
Start: 2021-10-04 | End: 2021-11-02

## 2021-10-19 RX ORDER — TOPIRAMATE 25 MG/1
TABLET ORAL
Qty: 60 TABLET | Refills: 2 | Status: SHIPPED | OUTPATIENT
Start: 2021-10-19 | End: 2021-12-09

## 2021-10-19 RX ORDER — GLIMEPIRIDE 2 MG/1
1 TABLET ORAL EVERY MORNING
COMMUNITY
Start: 2021-10-04 | End: 2021-10-19 | Stop reason: ALTCHOICE

## 2021-10-19 NOTE — ASSESSMENT & PLAN NOTE
Hypertension is stable.  Continue current treatment regimen.  Weight loss.  Regular aerobic exercise.  Blood pressure will be reassessed in 2 weeks.  On lisinopril with PCP

## 2021-10-19 NOTE — ASSESSMENT & PLAN NOTE
Inhibits regular physical activity. Does weights, walks when able. Has stationary bike under her desk.

## 2021-10-19 NOTE — ASSESSMENT & PLAN NOTE
Weight reduction. Confirm no sleep study has been conducted. Still stops breathing while sleeping.

## 2021-10-19 NOTE — ASSESSMENT & PLAN NOTE
Patient's Body mass index is 38.45 kg/m². indicating that she is obese (BMI >30). Obesity-related health conditions include the following: hypertension, diabetes mellitus, dyslipidemias and GERD.  Weight is unchanged. BMI is is above average; BMI management plan is completed. We discussed low calorie, low carb based diet program, portion control, increasing exercise, pharmacologic options including topiramate and saxenda, an sally-based approach such as TOOVIA Pal or Lose It and Noom.      Topics of discussion included obesity as a disease, nutritional education on food groups, exercise, and medications. Patient was instructed in adequate protein, controlled carb and controlled fat intake.   Patient received instructions on using the medicines as a tool in controlling their weight with nutritional and behavioral changes. Risks and benefits were discussed. I believe the potential benefits of medication helping to decrease weight outweighs the risks. Patient received our clinic education booklet.   Our patient consent form was reviewed including potential risks of weight loss. We also reviewed our confidentiality and HIPPA statements. Patients current FITT score was reviewed along with current capability for exercise tolerance and a patient will work towards a FITT score of:     Frequency   Intensity Time Strength Training   []   0 None  []   0 None  []   0 None  []   0 None    [x]   1 (1-2x/week) [x]   1 (light) []   1 (<10 min) []   1 (1x/week)   []   2 (3-5x/week) [x]   2 (moderate) []   2 (10-20 min) [x]   2 (2x/week)   []   3 (daily)   []   3 (moderately hard)  []   4 (very hard) [x]   3 (20-30 min)  []   4 (>30 min) []   3 (3-4x/week)       Patient's past medical history was reviewed in detail and barriers to weight loss were identified and discussed. Past efforts at weight reduction on their own as well as under physician supervision were documented and discussed.  I advised patient to continue routine care  with their Primary Care Provider.     Nutritional recommendations and goals were reviewed including Calories:0355-8466 daily adjusted for exercise calories burnt, Protein:>100 g daily, Net carbs (total carb - fiber) of 50-75g per day.  Start to keep a food journal and bring into next visit in 2 weeks for review- uses Noom and MFP.   Practice the behavioral modification technique of mindful eating.   Start topiramate, discussed R/B.

## 2021-10-19 NOTE — ASSESSMENT & PLAN NOTE
Lipid abnormalities are stable on trintillex.  managed by PCP  Lipids will be reassessed with PCP.

## 2021-10-19 NOTE — PROGRESS NOTES
Chief Complaint  Obesity, Nutrition Counseling, and Consult    Subjective          Eloise Delatorre presents to [facility] for obesity management and initiation of medical weight loss. s/p LSG/liver bx (steatohepatitis) 2/25/20 w/ Dr. Guzman. Presurgery weight: ~250lb per patient.  Today's weight is 227.5 lb 103 kg (227 lb 8 oz) pounds and weight loss since surgery is 0 pounds. PCP manages diabetes. Quit amaryl about 7 days ago. Wasn't on it long. Started it due to metformin recall and thinks it was accidentally continued when she restarted metformin. Experienced hypoglycemia (asymptomatic) on labs so PCP discontinued glimepiride. Her regular PCP passed away suddenly, established care with Dr. Scott recently.   Took phentermine years ago, doesn't recall it helping.   Did have renal stones as a teenager, drank a lot of mt dew at the time.   Took topiramate for migraines years ago, feels    Weight 1 year ago: 235  Weight 5 years ago: 241  The patient is exercising with a FITT score of: Weights, stationary bike under desk, walking as tolerated. Back pain inhibits. Not interested in exercise education at this time.     The following seem to sabotage weight loss efforts:comfort/stress eating, enjoyment of food, social events, eating late, waking up eating and boredom eating    Review of Systems   Constitutional: Positive for fatigue.        Positive for weight gain   HENT: Positive for trouble swallowing.         Negative for throat swelling   Respiratory: Positive for shortness of breath and wheezing.         Negative for snoring   Cardiovascular: Positive for leg swelling. Negative for chest pain and palpitations.   Gastrointestinal: Positive for abdominal pain, constipation, diarrhea and GERD. Negative for indigestion.   Endocrine: Negative for cold intolerance, heat intolerance, polydipsia, polyphagia and polyuria.        Negative for loss of hair  Negative for hirsutism    "  Genitourinary:        Denies menstrual irregularities   Musculoskeletal: Negative for arthralgias.        Denies exercise limitations  Denies chronic pain   Skin: Positive for dry skin.        Negative for acne   Neurological: Positive for memory problem.        Negative for paresthesias   Psychiatric/Behavioral: Positive for sleep disturbance and depressed mood. Negative for self-injury and suicidal ideas. The patient is nervous/anxious.      PHQ-9 Total Score:   7    Objective   Body composition analysis completed and showed:   %body fat:49.3%    Measurements (in inches)  Waist: 50.8    Vital Signs:   /88 (BP Location: Left arm, Patient Position: Sitting, Cuff Size: Adult)   Pulse 88   Temp 97.5 °F (36.4 °C) (Temporal)   Resp 18   Ht 163.8 cm (64.5\")   Wt 103 kg (227 lb 8 oz)   SpO2 98%   BMI 38.45 kg/m²     Physical Exam   General appears stated age, normal appearance and central obesity   HEENT Mallampati score III or IV, enlarged neck circumference, PERRLA, EOM intact, conjunctivae normal and without thyromegaly or thyroid nodules   Chest/lungs Normal rate, Regular rhythm, Breathing is unlabored and Clear to auscultation bilaterally   Abdomen deferred   Extremities without edema   Neuro Normal DTRs, Good historian and No focal deficit   Skin Acanthosis nigricans and Warm, dry, intact   Psych normal behavior, normal thought content and normal concentration     Result Review :     A1c was 5.9  Lipids: just restarted atorvastatin. TC: 182, Tri, HDL: 50, LDL: 106.        [facility]  Outside Information             THYROID STIMULATING HORMONE  Specimen:  Blood  Component   Ref Range & Units 2 wk ago   TSH   0.270 - 4.200 mcIU/mL 1.860    Resulting Agency [facility]     Narrative  Performed by [facility]  Ingestion of twila doses of biotin (>5 mg/day) taken within 8 hours of drawing blood sample can interfere with this immunoassay test.  Specimen " Collected: 09/29/21  9:36 AM Last Resulted: 09/29/21  2:33 PM   Received From: [facility]  Result Received: 10/19/21  8:03 AM   View Encounter     Recent Data from [facility]  Related to THYROID STIMULATING HORMONE  Component 09/29/21 01/21/19 04/23/18 11/11/16 10/20/15 08/26/14   TSH 1.860 2.350 1.890 1.990 2.200 2.380   View all related data       CMP    CMP 9/1/21   Glucose 48 (A)   BUN 16   Creatinine 0.90   eGFR Non African Am 67   Sodium 140   Potassium 3.9   Chloride 109 (A)   Calcium 9.1   Albumin 4.40   Total Bilirubin <0.2   Alkaline Phosphatase 71   AST (SGOT) 15   ALT (SGPT) 22   (A) Abnormal value            CBC    CBC 9/1/21   WBC 12.11 (A)   RBC 4.96   Hemoglobin 13.3   Hematocrit 42.1   MCV 84.9   MCH 26.8   MCHC 31.6   RDW 14.8   Platelets 289   (A) Abnormal value                      Assessment and Plan    Diagnoses and all orders for this visit:    1. Morbid obesity (HCC) (Primary)  Assessment & Plan:   Patient's Body mass index is 38.45 kg/m². indicating that she is obese (BMI >30). Obesity-related health conditions include the following: hypertension, diabetes mellitus, dyslipidemias and GERD.  Weight is unchanged. BMI is is above average; BMI management plan is completed. We discussed low calorie, low carb based diet program, portion control, increasing exercise, pharmacologic options including topiramate and saxenda, an sally-based approach such as Etsy Pal or Lose It and Noom.      Topics of discussion included obesity as a disease, nutritional education on food groups, exercise, and medications. Patient was instructed in adequate protein, controlled carb and controlled fat intake.   Patient received instructions on using the medicines as a tool in controlling their weight with nutritional and behavioral changes. Risks and benefits were discussed. I believe the potential benefits of medication helping to decrease weight outweighs the risks. Patient received our  clinic education booklet.   Our patient consent form was reviewed including potential risks of weight loss. We also reviewed our confidentiality and HIPPA statements. Patients current FITT score was reviewed along with current capability for exercise tolerance and a patient will work towards a FITT score of:     Frequency   Intensity Time Strength Training   []   0 None  []   0 None  []   0 None  []   0 None    [x]   1 (1-2x/week) [x]   1 (light) []   1 (<10 min) []   1 (1x/week)   []   2 (3-5x/week) [x]   2 (moderate) []   2 (10-20 min) [x]   2 (2x/week)   []   3 (daily)   []   3 (moderately hard)  []   4 (very hard) [x]   3 (20-30 min)  []   4 (>30 min) []   3 (3-4x/week)       Patient's past medical history was reviewed in detail and barriers to weight loss were identified and discussed. Past efforts at weight reduction on their own as well as under physician supervision were documented and discussed.  I advised patient to continue routine care with their Primary Care Provider.     Nutritional recommendations and goals were reviewed including Calories:4487-9969 daily adjusted for exercise calories burnt, Protein:>100 g daily, Net carbs (total carb - fiber) of 50-75g per day.  Start to keep a food journal and bring into next visit in 2 weeks for review- uses Noom and MFP.   Practice the behavioral modification technique of mindful eating.   Start topiramate, discussed R/B.      Orders:  -     Liraglutide (SAXENDA) 18 MG/3ML injection pen; Inject 3 mg under the skin into the appropriate area as directed Daily for 90 days.  Dispense: 5 pen; Refill: 2  -     Insulin Pen Needle 32G X 4 MM misc; 1 dose Daily.  Dispense: 90 each; Refill: 0  -     topiramate (Topamax) 25 MG tablet; Take one daily at bedtime for a week then increase to 2 daily at bedtime  Dispense: 60 tablet; Refill: 2    2. Type 2 diabetes mellitus without complication, without long-term current use of insulin ([facility])  Assessment & Plan:  Titrate up to  3.0mg. Keep snack on hand.     Orders:  -     Liraglutide (SAXENDA) 18 MG/3ML injection pen; Inject 3 mg under the skin into the appropriate area as directed Daily for 90 days.  Dispense: 5 pen; Refill: 2  -     Insulin Pen Needle 32G X 4 MM misc; 1 dose Daily.  Dispense: 90 each; Refill: 0    3. Hypertriglyceridemia  Assessment & Plan:  Lipid abnormalities are stable on trintillex.  managed by PCP  Lipids will be reassessed with PCP.      4. Hypertension, unspecified type  Assessment & Plan:  Hypertension is stable.  Continue current treatment regimen.  Weight loss.  Regular aerobic exercise.  Blood pressure will be reassessed in 2 weeks.  On lisinopril with PCP        5. Gastroesophageal reflux disease, unspecified whether esophagitis present    6. Sleep apnea, unspecified type  Assessment & Plan:  Weight reduction. Confirm no sleep study has been conducted. Still stops breathing while sleeping.       7. Asthma, unspecified asthma severity, unspecified whether complicated, unspecified whether persistent    8. Chronic back pain, unspecified back location, unspecified back pain laterality  Assessment & Plan:  Inhibits regular physical activity. Does weights, walks when able. Has stationary bike under her desk.          I spent 63 minutes caring for Eloise on this date of service. This time includes time spent by me in the following activities:preparing for the visit, reviewing tests, obtaining and/or reviewing a separately obtained history, performing a medically appropriate examination and/or evaluation , counseling and educating the patient/family/caregiver, ordering medications, tests, or procedures and documenting information in the medical record  Follow Up   Return in about 2 weeks (around 11/2/2021) for Next scheduled follow up, Labs this visit.  Patient was given instructions and counseling regarding her condition or for health maintenance advice. Please see specific information pulled into the AVS if  appropriate.

## 2021-10-21 LAB
AMPHETAMINES UR QL SCN: NEGATIVE NG/ML
BARBITURATES UR QL SCN: NEGATIVE NG/ML
BENZODIAZ UR QL SCN: NEGATIVE NG/ML
BZE UR QL SCN: NEGATIVE NG/ML
CANNABINOIDS UR QL SCN: NEGATIVE NG/ML
CREAT UR-MCNC: 119.3 MG/DL (ref 20–300)
LABORATORY COMMENT REPORT: NORMAL
METHADONE UR QL SCN: NEGATIVE NG/ML
OPIATES UR QL SCN: NEGATIVE NG/ML
OXYCODONE+OXYMORPHONE UR QL SCN: NEGATIVE NG/ML
PCP UR QL: NEGATIVE NG/ML
PH UR: 8.7 [PH] (ref 4.5–8.9)
PROPOXYPH UR QL SCN: NEGATIVE NG/ML

## 2021-11-02 ENCOUNTER — OFFICE VISIT (OUTPATIENT)
Dept: BARIATRICS/WEIGHT MGMT | Facility: CLINIC | Age: 46
End: 2021-11-02

## 2021-11-02 VITALS
TEMPERATURE: 97.5 F | OXYGEN SATURATION: 94 % | SYSTOLIC BLOOD PRESSURE: 118 MMHG | DIASTOLIC BLOOD PRESSURE: 80 MMHG | BODY MASS INDEX: 37.74 KG/M2 | HEIGHT: 65 IN | WEIGHT: 226.5 LBS | HEART RATE: 83 BPM

## 2021-11-02 DIAGNOSIS — E11.9 TYPE 2 DIABETES MELLITUS WITHOUT COMPLICATION, WITHOUT LONG-TERM CURRENT USE OF INSULIN (HCC): ICD-10-CM

## 2021-11-02 DIAGNOSIS — E66.01 MORBID OBESITY (HCC): Primary | ICD-10-CM

## 2021-11-02 PROBLEM — E66.812 OBESITY, CLASS II, BMI 35-39.9: Status: ACTIVE | Noted: 2020-02-04

## 2021-11-02 PROBLEM — E66.9 OBESITY, CLASS II, BMI 35-39.9: Status: ACTIVE | Noted: 2020-02-04

## 2021-11-02 PROCEDURE — 99213 OFFICE O/P EST LOW 20 MIN: CPT | Performed by: NURSE PRACTITIONER

## 2021-11-02 RX ORDER — ATORVASTATIN CALCIUM 20 MG/1
20 TABLET, FILM COATED ORAL
COMMUNITY
Start: 2021-10-25

## 2021-11-02 NOTE — ASSESSMENT & PLAN NOTE
Patient's (Body mass index is 38.28 kg/m².) indicates that they are morbidly obese (BMI > 40 or > 35 with obesity - related health condition) with health conditions that include hypertension, diabetes mellitus, dyslipidemias and GERD . Weight is improving with treatment. BMI is is above average; BMI management plan is completed. We discussed low calorie, low carb based diet program, portion control, increasing exercise, pharmacologic options including saxenda and topiramate and an sally-based approach such as Volofy Pal or Lose It.    I have instructed the patient to continue with pursuit of medical weight loss as a part of this program. Patient does meet criteria for use of anorectics at this time as BMI >27 and is not at treatment goal.   Continue nutritional focus and work towards new exercise FITT goal of: 2-2-4-2.  The current plan for this month includes: weight loss goal 4-6lbs this month and continue to work on lifestyle behavioral changes. Add topiramate.

## 2021-11-02 NOTE — PROGRESS NOTES
"     Office Note      Date: 2021  Patient Name: Eloise Delatorre  MRN: [id number]  : 1975    Chief Complaint  Obesity (2 wk f/u )  Subjective  Subjective           Eloise Delatorre presents to [facility] for obesity management and insufficient weight loss. s/p LSG/liver bx (steatohepatitis) 20 w/ Dr. Guzman. Patient is satisfied with weight loss progress. Appetite is well controlled. Reports no side effects of prescribed medications today. The patient is taking multivitamin and is taking fish oil.  The patient is using a food journal. She has not started the topiramate yet, is waiting until Monday. Did increase the liraglutide to 3.0mg and is tolerating well. Starts Saxenda brand this week.  Body mass index is 38.28 kg/m².   The patient is exercising with a FITT score of:    Frequency Intensity Time Strength Training   []   0, none []   0 []   0 [x]   0   [x]   1 (1-2x/week) [x]   1 (light) [x]   1 (<10 min) []   1 (1x/week)   []   2 (3-5x/week) []   2 (moderate) []   2 (10-20 min) []   2 (2x/week)   []   3 (daily) []   3 (moderately hard)  []   4 (very hard) []   3 (20-30 min)  []   4 (>30 min) []   3 (3-4x/week)       Start weight MWM : 227 pounds.    Total Loss/%Loss of BBW: -1lb  Change in weight since last visit: -1lb    Review of Systems    Objective     Body composition analysis completed and showed:   %body fat: 49.3  Measurements (in inches)  Waist: 50.8\"  Vital Signs:   /80   Pulse 83   Temp 97.5 °F (36.4 °C) (Temporal)   Ht 163.8 cm (64.5\")   Wt 103 kg (226 lb 8 oz)   SpO2 94%   BMI 38.28 kg/m²     Physical Exam   General appears stated age and normal appearance   HEENT PERRLA, EOM intact and conjunctivae normal   Chest/lungs Normal rate, Regular rhythm and Breathing is unlabored   Extremities without edema   Neuro Good historian and No focal deficit   Skin Warm, dry, intact   Psych normal behavior, normal thought content and normal " concentration      Result Review :                Assessment / Plan        Diagnoses and all orders for this visit:    1. Morbid obesity (HCC) (Primary)  Assessment & Plan:  Patient's (Body mass index is 38.28 kg/m².) indicates that they are morbidly obese (BMI > 40 or > 35 with obesity - related health condition) with health conditions that include hypertension, diabetes mellitus, dyslipidemias and GERD . Weight is improving with treatment. BMI is is above average; BMI management plan is completed. We discussed low calorie, low carb based diet program, portion control, increasing exercise, pharmacologic options including saxenda and topiramate and an sally-based approach such as [employer] Pal or Lose It.    I have instructed the patient to continue with pursuit of medical weight loss as a part of this program. Patient does meet criteria for use of anorectics at this time as BMI >27 and is not at treatment goal.   Continue nutritional focus and work towards new exercise FITT goal of: 2-2-4-2.  The current plan for this month includes: weight loss goal 4-6lbs this month and continue to work on lifestyle behavioral changes. Add topiramate.          2. Type 2 diabetes mellitus without complication, without long-term current use of insulin (HCC)  Assessment & Plan:  Low carb diet, regular physical activity, and weight reduction. Continue liraglutide 3.0mg.          Follow Up   Return in about 4 weeks (around 11/30/2021) for Next scheduled follow up.  Patient was given instructions and counseling regarding her condition or for health maintenance advice. Please see specific information pulled into the AVS if appropriate.     Katy Dobson, GIANA  11/02/2021

## 2021-12-09 ENCOUNTER — TELEMEDICINE (OUTPATIENT)
Dept: BARIATRICS/WEIGHT MGMT | Facility: CLINIC | Age: 46
End: 2021-12-09

## 2021-12-09 VITALS — BODY MASS INDEX: 37.32 KG/M2 | HEIGHT: 65 IN | WEIGHT: 224 LBS

## 2021-12-09 DIAGNOSIS — E11.9 TYPE 2 DIABETES MELLITUS WITHOUT COMPLICATION, WITHOUT LONG-TERM CURRENT USE OF INSULIN (HCC): ICD-10-CM

## 2021-12-09 DIAGNOSIS — Z90.3 POSTGASTRECTOMY MALABSORPTION: ICD-10-CM

## 2021-12-09 DIAGNOSIS — M54.9 CHRONIC BACK PAIN, UNSPECIFIED BACK LOCATION, UNSPECIFIED BACK PAIN LATERALITY: ICD-10-CM

## 2021-12-09 DIAGNOSIS — G89.29 CHRONIC BACK PAIN, UNSPECIFIED BACK LOCATION, UNSPECIFIED BACK PAIN LATERALITY: ICD-10-CM

## 2021-12-09 DIAGNOSIS — E66.01 MORBID OBESITY (HCC): Primary | ICD-10-CM

## 2021-12-09 DIAGNOSIS — K91.2 POSTGASTRECTOMY MALABSORPTION: ICD-10-CM

## 2021-12-09 PROCEDURE — 99213 OFFICE O/P EST LOW 20 MIN: CPT | Performed by: NURSE PRACTITIONER

## 2021-12-09 RX ORDER — TOPIRAMATE 25 MG/1
TABLET ORAL
Qty: 180 TABLET | Refills: 0 | Status: SHIPPED | OUTPATIENT
Start: 2021-12-09 | End: 2022-01-25 | Stop reason: SDUPTHER

## 2021-12-09 RX ORDER — CHOLECALCIFEROL (VITAMIN D3) 50 MCG
2000 TABLET ORAL DAILY
Qty: 90 TABLET | Refills: 2 | Status: SHIPPED | OUTPATIENT
Start: 2021-12-09 | End: 2022-12-09

## 2021-12-09 NOTE — PROGRESS NOTES
Office Note      Date: 2021  Patient Name: Eloise Delatorre  MRN: [id number]  : 1975    Patient presents during the COVID-19 pandemic/federally declared [facility] of public health emergency.  This service was conducted via Zoom.  Patient is located at her home.  Provider is located at her primary office location. The use of a video visit has been reviewed with the patient and verbal informed consent has been obtained.  Subjective  Subjective     Chief Complaint  Obesity Management follow-up    Subjective          Eloise Delatorre presents to [facility] via telehealth for obesity management. s/p LSG/liver bx (steatohepatitis) 20 w/ Dr. Guzman  Presurgery weight: 281 pounds (~250lb per patient).  Patient is unsatisfied with weight loss progress. Feels like she could have done better with some choices. Eats a lot of tuna, chicken. Weakness is mayonnaise. Feels like she could have done better with exercise, is fearful of hurting herself because of her back pain. Appetite is well controlled. Reports no side effects of prescribed medications today other than change in taste with topiramate. Stopped glimepiride because of mild symptomatic hypoglycemia. Sleeping better. The patient is sort of exercising. Walking around the house more, not sitting as much. Not doing a food journal consistently at this time- doesn't really like Noom. Likes myfitnesspal better. Has been switching between the two. Thinks she does well with hydration. Did have some grapefruit juice for a few days but that is gone now.     Review of Systems   Constitutional: Negative for fatigue.   Eyes: Negative for visual disturbance.   Cardiovascular: Negative for chest pain and palpitations.   Gastrointestinal: Negative for abdominal pain, constipation, diarrhea, nausea and GERD.   Neurological: Negative for dizziness, tremors, light-headedness, headache and memory problem.        Parasthesias  "negative   Psychiatric/Behavioral: Negative for sleep disturbance and depressed mood. The patient is not nervous/anxious.      Objective   Body mass index is 37.86 kg/m².  Start weight MWM: 227 pounds.    Total weight loss: -3 pounds  Change in weight since last visit: -2lb    Vitals from office visit with PCP on 12/6/21:  /64  Ht 163.8 cm (64.5\")   Wt 102 kg (224 lb)   BMI 37.86 kg/m²       Result Review :   The following data was reviewed by: GIANA Auguste on 12/09/2021:  CMP    CMP 9/1/21   Glucose 48 (A)   BUN 16   Creatinine 0.90   eGFR Non African Am 67   Sodium 140   Potassium 3.9   Chloride 109 (A)   Calcium 9.1   Albumin 4.40   Total Bilirubin <0.2   Alkaline Phosphatase 71   AST (SGOT) 15   ALT (SGPT) 22   (A) Abnormal value              Outside labs: A1c 6.0                Assessment and Plan    Diagnoses and all orders for this visit:    1. Morbid obesity (HCC) (Primary)  Assessment & Plan:  Patient's (Body mass index is 37.86 kg/m².) indicates that they are morbidly obese (BMI > 40 or > 35 with obesity - related health condition) with health conditions that include diabetes mellitus and dyslipidemias . Weight is improving with treatment. BMI is is above average; BMI management plan is completed. We discussed low calorie, low carb based diet program, portion control, increasing exercise, joining a fitness center or start home based exercise program, pharmacologic options including saxenda and topiramate and an sally-based approach such as Vertical Acuity Pal or Lose It.    I have instructed the patient to continue with pursuit of medical weight loss as a part of this program. Patient does meet criteria for use of anorectics at this time as BMI >27 and is not at treatment goal.     Continue nutritional focus and work towards new exercise FITT goal of: 2-2-4-2. Refer to exercise education.   The current plan for this month includes: weight loss goal 4-6lbs this month and continue to work on " lifestyle behavioral changes. Daily food journal. Continue current meds for now. Check on wegovy coverage.         Orders:  -     Ambulatory Referral to Exercise Education  -     topiramate (Topamax) 25 MG tablet; Take two tablets by mouth daily at bedtime.  Dispense: 180 tablet; Refill: 0  -     Liraglutide (SAXENDA) 18 MG/3ML injection pen; Inject 3 mg under the skin into the appropriate area as directed Daily for 90 days.  Dispense: 15 pen; Refill: 0  -     Cholecalciferol (Vitamin D) 50 MCG (2000 UT) tablet; Take 2,000 Units by mouth Daily.  Dispense: 90 tablet; Refill: 2    2. Type 2 diabetes mellitus without complication, without long-term current use of insulin ([facility])  Assessment & Plan:  Diabetes is stable.   Continue current treatment regimen.  Regular aerobic exercise.  Diabetes will be reassessed at next appointment.    Orders:  -     Liraglutide (SAXENDA) 18 MG/3ML injection pen; Inject 3 mg under the skin into the appropriate area as directed Daily for 90 days.  Dispense: 15 pen; Refill: 0    3. Postgastrectomy malabsorption  -     Cholecalciferol (Vitamin D) 50 MCG (2000 UT) tablet; Take 2,000 Units by mouth Daily.  Dispense: 90 tablet; Refill: 2    4. Chronic back pain, unspecified back location, unspecified back pain laterality  -     Ambulatory Referral to Exercise Education        Follow Up   Return in about 6 weeks (around 1/20/2022) for Next scheduled follow up.  Patient was given instructions and counseling regarding her condition or for health maintenance advice. Please see specific information pulled into the AVS if appropriate.     GIANA Bell

## 2021-12-09 NOTE — ASSESSMENT & PLAN NOTE
Patient's (Body mass index is 37.86 kg/m².) indicates that they are morbidly obese (BMI > 40 or > 35 with obesity - related health condition) with health conditions that include diabetes mellitus and dyslipidemias . Weight is improving with treatment. BMI is is above average; BMI management plan is completed. We discussed low calorie, low carb based diet program, portion control, increasing exercise, joining a fitness center or start home based exercise program, pharmacologic options including saxenda and topiramate and an sally-based approach such as [employer] Pal or Lose It.    I have instructed the patient to continue with pursuit of medical weight loss as a part of this program. Patient does meet criteria for use of anorectics at this time as BMI >27 and is not at treatment goal.     Continue nutritional focus and work towards new exercise FITT goal of: 2-2-4-2. Refer to exercise education.   The current plan for this month includes: weight loss goal 4-6lbs this month and continue to work on lifestyle behavioral changes. Daily food journal. Continue current meds for now. Check on wegovy coverage.

## 2021-12-09 NOTE — ASSESSMENT & PLAN NOTE
Diabetes is stable.   Continue current treatment regimen.  Regular aerobic exercise.  Diabetes will be reassessed at next appointment.

## 2022-01-25 ENCOUNTER — OFFICE VISIT (OUTPATIENT)
Dept: BARIATRICS/WEIGHT MGMT | Facility: CLINIC | Age: 47
End: 2022-01-25

## 2022-01-25 VITALS
HEART RATE: 105 BPM | SYSTOLIC BLOOD PRESSURE: 126 MMHG | WEIGHT: 227.5 LBS | HEIGHT: 65 IN | OXYGEN SATURATION: 98 % | RESPIRATION RATE: 18 BRPM | TEMPERATURE: 96.6 F | DIASTOLIC BLOOD PRESSURE: 70 MMHG | BODY MASS INDEX: 37.9 KG/M2

## 2022-01-25 DIAGNOSIS — E11.9 TYPE 2 DIABETES MELLITUS WITHOUT COMPLICATION, WITHOUT LONG-TERM CURRENT USE OF INSULIN: ICD-10-CM

## 2022-01-25 DIAGNOSIS — E66.01 MORBID OBESITY: Primary | ICD-10-CM

## 2022-01-25 PROCEDURE — 99213 OFFICE O/P EST LOW 20 MIN: CPT | Performed by: NURSE PRACTITIONER

## 2022-01-25 RX ORDER — METFORMIN HYDROCHLORIDE 500 MG/1
500 TABLET, EXTENDED RELEASE ORAL DAILY
Qty: 90 TABLET | Refills: 2 | Status: SHIPPED | OUTPATIENT
Start: 2022-01-25 | End: 2022-04-21

## 2022-01-25 RX ORDER — TOPIRAMATE 25 MG/1
TABLET ORAL
Qty: 180 TABLET | Refills: 0 | Status: SHIPPED | OUTPATIENT
Start: 2022-01-25 | End: 2022-03-24

## 2022-01-25 NOTE — ASSESSMENT & PLAN NOTE
Diabetes is stable.   Continue current treatment regimen.  Regular aerobic exercise.  Diabetes will be reassessed in 1 month.  Increase metformin to maximize weight loss potential.

## 2022-01-25 NOTE — PROGRESS NOTES
Office Note      Date: 2022  Patient Name: Eloise Delatorre  MRN: [id number]  : 1975     Chief Complaint  Obesity and Follow-up  Subjective  Subjective           Eloise Delatorre presents to [facility] for obesity management. s/p LSG/liver bx (steatohepatitis) 20 w/ Dr. Guzman.   Patient is unsure with weight loss progress. Appetite is moderately controlled. Craving sweets. Reports no side effects of prescribed medications today. The patient is taking multivitamin and is not taking fish oil.  The patient is using a food journal, Noom, sometimes MFP. Trying to stay focused on high protein, low carb diet. Is weighing at home daily. Got of track with the holidays. Denies hypoglycemia.    The patient is exercising with a FITT score of:    Frequency Intensity Time Strength Training   [x]   0, none [x]   0 [x]   0 [x]   0   []   1 (1-2x/week) []   1 (light) []   1 (<10 min) []   1 (1x/week)   []   2 (3-5x/week) []   2 (moderate) []   2 (10-20 min) []   2 (2x/week)   []   3 (daily) []   3 (moderately hard)  []   4 (very hard) []   3 (20-30 min)  []   4 (>30 min) []   3 (3-4x/week)       Review of Systems   Constitutional: Negative for fatigue.   Cardiovascular: Negative for chest pain and palpitations.   Gastrointestinal: Negative for abdominal pain, constipation, diarrhea, nausea and GERD.   Neurological: Negative for dizziness, tremors, light-headedness, headache and memory problem.        Parasthesias negative   Psychiatric/Behavioral: Negative for sleep disturbance and depressed mood. The patient is not nervous/anxious.      Objective    Body mass index is 38.45 kg/m².  Start weight:227 pounds.    Total Loss/%Loss of BBW: +0.44  Change in weight since last visit: +0.5 Body composition analysis completed and showed:   %body fat: 47.1  Measurements (in inches)  Waist: 48.2  Vital Signs:   /70 (BP Location: Left arm, Patient Position: Sitting, Cuff  "Size: Adult)   Pulse 105   Temp 96.6 °F (35.9 °C) (Temporal)   Resp 18   Ht 163.8 cm (64.5\")   Wt 103 kg (227 lb 8 oz)   SpO2 98%   BMI 38.45 kg/m²     Physical Exam   General appears stated age and normal appearance   HEENT PERRLA, EOM intact and conjunctivae normal   Chest/lungs Normal rate, Regular rhythm and Breathing is unlabored   Extremities without edema   Neuro Good historian and No focal deficit   Skin Warm, dry, intact   Psych normal behavior, normal thought content and normal concentration     Result Review :                Assessment / Plan        Diagnoses and all orders for this visit:    1. Morbid obesity (HCC) (Primary)  Assessment & Plan:  Patient's (Body mass index is 38.45 kg/m².) indicates that they are morbidly obese (BMI > 40 or > 35 with obesity - related health condition) with health conditions that include hypertension, diabetes mellitus and dyslipidemias . Weight is improving with treatment. BMI is is above average; BMI management plan is completed. We discussed low calorie, low carb based diet program, portion control, increasing exercise, pharmacologic options including topiramate, saxenda and an sally-based approach such as Search[employer] Pal or Lose It.     I have instructed the patient to continue with pursuit of medical weight loss as a part of this program. Patient does meet criteria for use of anorectics at this time as BMI >27 and is not at treatment goal.     Continue nutritional focus and work towards new exercise FITT goal of: 2-2-4-2.  The current plan for this month includes: weight loss goal 4-6lbs this month and continue to work on lifestyle behavioral changes. Add back topamax. Increase metformin.       Orders:  -     topiramate (Topamax) 25 MG tablet; Take two tablets by mouth daily at bedtime.  Dispense: 180 tablet; Refill: 0  -     Liraglutide (SAXENDA) 18 MG/3ML injection pen; Inject 3 mg under the skin into the appropriate area as directed Daily for 90 days.  " Dispense: 15 pen; Refill: 0    2. Type 2 diabetes mellitus without complication, without long-term current use of insulin ([facility])  Assessment & Plan:  Diabetes is stable.   Continue current treatment regimen.  Regular aerobic exercise.  Diabetes will be reassessed in 1 month.  Increase metformin to maximize weight loss potential.     Orders:  -     metFORMIN ER (GLUCOPHAGE-XR) 500 MG 24 hr tablet; Take 1 tablet by mouth Daily. Take 3 tablets by mouth daily with food.  Dispense: 90 tablet; Refill: 2  -     Liraglutide (SAXENDA) 18 MG/3ML injection pen; Inject 3 mg under the skin into the appropriate area as directed Daily for 90 days.  Dispense: 15 pen; Refill: 0        Follow Up   Return in about 4 weeks (around 2/22/2022) for Next scheduled follow up.  Patient was given instructions and counseling regarding her condition or for health maintenance advice. Please see specific information pulled into the AVS if appropriate.     Katy Dobson, APRN  01/25/2022

## 2022-01-25 NOTE — ASSESSMENT & PLAN NOTE
Patient's (Body mass index is 38.45 kg/m².) indicates that they are morbidly obese (BMI > 40 or > 35 with obesity - related health condition) with health conditions that include hypertension, diabetes mellitus and dyslipidemias . Weight is improving with treatment. BMI is is above average; BMI management plan is completed. We discussed low calorie, low carb based diet program, portion control, increasing exercise, pharmacologic options including topiramate, saxenda and an sally-based approach such as HuoBi Pal or Lose It.     I have instructed the patient to continue with pursuit of medical weight loss as a part of this program. Patient does meet criteria for use of anorectics at this time as BMI >27 and is not at treatment goal.     Continue nutritional focus and work towards new exercise FITT goal of: 2-2-4-2.  The current plan for this month includes: weight loss goal 4-6lbs this month and continue to work on lifestyle behavioral changes. Add back topamax. Increase metformin.

## 2022-02-22 ENCOUNTER — TELEMEDICINE (OUTPATIENT)
Dept: BARIATRICS/WEIGHT MGMT | Facility: CLINIC | Age: 47
End: 2022-02-22

## 2022-02-22 VITALS — HEIGHT: 65 IN | WEIGHT: 223.8 LBS | BODY MASS INDEX: 37.29 KG/M2

## 2022-02-22 DIAGNOSIS — E66.01 MORBID OBESITY: Primary | ICD-10-CM

## 2022-02-22 DIAGNOSIS — E11.9 TYPE 2 DIABETES MELLITUS WITHOUT COMPLICATION, WITHOUT LONG-TERM CURRENT USE OF INSULIN: ICD-10-CM

## 2022-02-22 PROCEDURE — 99213 OFFICE O/P EST LOW 20 MIN: CPT | Performed by: NURSE PRACTITIONER

## 2022-02-22 RX ORDER — L.ACID,PARA/B.BIFIDUM/S.THERM 8B CELL
CAPSULE ORAL
COMMUNITY

## 2022-02-22 RX ORDER — AZITHROMYCIN 250 MG/1
250 TABLET, FILM COATED ORAL DAILY
COMMUNITY
Start: 2022-02-17 | End: 2022-03-24

## 2022-02-22 NOTE — ASSESSMENT & PLAN NOTE
Diabetes is stable.   Continue current treatment regimen., switch to wegovy when available.  Diabetes will be reassessed in 1 month.

## 2022-02-22 NOTE — ASSESSMENT & PLAN NOTE
Patient's (Body mass index is 37.82 kg/m².) indicates that they are morbidly obese (BMI > 40 or > 35 with obesity - related health condition) with health conditions that include obstructive sleep apnea, hypertension, dyslipidemias and GERD . Weight is improving with treatment. BMI is is above average; BMI management plan is completed. We discussed low calorie, low carb based diet program, portion control, increasing exercise, management of depression/anxiety/stress to control compensatory eating, pharmacologic options including saxenda and topiramate and metformin and an sally-based approach such as Epoch Pal or Lose It.    I have instructed the patient to continue with pursuit of medical weight loss as a part of this program. Patient does meet criteria for use of anorectics at this time as BMI >27 and is not at treatment goal.     Continue nutritional focus and work towards new exercise FITT goal of: 2-1-2-2. Increase as tolerated with back pain.   The current plan for this month includes: weight loss goal 4-6lbs this month, continue to work on lifestyle behavioral changes and continue nutrition focus. Add back topiramate when available at pharmacy. Switch to wegovy when available at pharmacy (patient understands this will replace saxenda and it is to be used WEEKLY instead of daily). Calorie goal [id number].

## 2022-02-22 NOTE — PROGRESS NOTES
Office Note      Date: 2022  Patient Name: Eloise Delatorre  MRN: [id number]  : 1975    Patient presents during the COVID-19 pandemic/federally declared [facility] public health emergency.  This service was conducted via Zoom.  Patient is located at her home.  Provider is located at her primary office location. The use of a video visit has been reviewed with the patient and verbal informed consent has been obtained.  Subjective  Subjective     Chief Complaint  Obesity Management follow-up    Subjective          Eloise Delatorre presents to [facility] via telehealth for obesity management. s/p LSG/liver bx (steatohepatitis) 20 w/ Dr. Guzman. Presurgery weight: 281 pounds (~250lb per patient)    Patient is satisfied with weight loss progress. Appetite is well controlled. Reports no side effects of prescribed medications today. Taking antibiotic for sinus infection. Hasn't had topiramate, pharmacy doesn't have it.  Thinks she is doing well with low carb diet. Doesn't eat bread much.  Knows she stress eats. Working on doing other things. Still using [employer] sally and that helps. Wasn't losing initially, thinks Chartbe[employer] set the calories too high at 1600. Lowered it by 100 and started losing weight. Still taking Saxenda, wegovy is unavailable at her pharmacy.   The patient is more physically active. Going up and down steps at home more. Trying not to overdo it because of her chronic back pain. Has been taking some walks.  is making healthy changes too.     Review of Systems   Constitutional: Negative for fatigue.   Eyes: Negative for visual disturbance.   Cardiovascular: Negative for chest pain and palpitations.   Gastrointestinal: Positive for GERD. Negative for abdominal pain, constipation, diarrhea and nausea.   Neurological: Negative for dizziness, tremors, light-headedness, headache and memory problem.        Parasthesias negative   Psychiatric/Behavioral:  "Negative for sleep disturbance and depressed mood. The patient is not nervous/anxious.      Objective   Body mass index is 37.82 kg/m².  Start weight: 227 pounds.    Total weight loss:-4 pounds  Change in weight since last visit: -4lb  Weight from patients home scale:  Ht 163.8 cm (64.5\")   Wt 102 kg (223 lb 12.8 oz)   BMI 37.82 kg/m²       Result Review :                 Assessment and Plan    Diagnoses and all orders for this visit:    1. Morbid obesity (HCC) (Primary)  Assessment & Plan:  Patient's (Body mass index is 37.82 kg/m².) indicates that they are morbidly obese (BMI > 40 or > 35 with obesity - related health condition) with health conditions that include obstructive sleep apnea, hypertension, dyslipidemias and GERD . Weight is improving with treatment. BMI is is above average; BMI management plan is completed. We discussed low calorie, low carb based diet program, portion control, increasing exercise, management of depression/anxiety/stress to control compensatory eating, pharmacologic options including saxenda and topiramate and metformin and an sally-based approach such as Fon Pal or Lose It.    I have instructed the patient to continue with pursuit of medical weight loss as a part of this program. Patient does meet criteria for use of anorectics at this time as BMI >27 and is not at treatment goal.     Continue nutritional focus and work towards new exercise FITT goal of: 2-1-2-2. Increase as tolerated with back pain.   The current plan for this month includes: weight loss goal 4-6lbs this month, continue to work on lifestyle behavioral changes and continue nutrition focus. Add back topiramate when available at pharmacy. Switch to wegovy when available at pharmacy (patient understands this will replace saxenda and it is to be used WEEKLY instead of daily). Calorie goal [id number].          2. Type 2 diabetes mellitus without complication, without long-term current use of insulin (HCC)  Assessment " & Plan:  Diabetes is stable.   Continue current treatment regimen., switch to wegovy when available.  Diabetes will be reassessed in 1 month.          Follow Up   Return in about 4 weeks (around 3/22/2022) for Next scheduled follow up.  Patient was given instructions and counseling regarding her condition or for health maintenance advice. Please see specific information pulled into the AVS if appropriate.     GIANA Bell

## 2022-03-24 ENCOUNTER — OFFICE VISIT (OUTPATIENT)
Dept: BARIATRICS/WEIGHT MGMT | Facility: CLINIC | Age: 47
End: 2022-03-24

## 2022-03-24 VITALS
HEART RATE: 94 BPM | BODY MASS INDEX: 36.99 KG/M2 | SYSTOLIC BLOOD PRESSURE: 128 MMHG | WEIGHT: 222 LBS | DIASTOLIC BLOOD PRESSURE: 68 MMHG | HEIGHT: 65 IN

## 2022-03-24 VITALS
OXYGEN SATURATION: 98 % | HEART RATE: 94 BPM | DIASTOLIC BLOOD PRESSURE: 68 MMHG | BODY MASS INDEX: 36.99 KG/M2 | HEIGHT: 65 IN | WEIGHT: 222 LBS | SYSTOLIC BLOOD PRESSURE: 128 MMHG | TEMPERATURE: 96.8 F | RESPIRATION RATE: 18 BRPM

## 2022-03-24 DIAGNOSIS — Z13.21 MALNUTRITION SCREEN: ICD-10-CM

## 2022-03-24 DIAGNOSIS — E66.01 CLASS 2 SEVERE OBESITY DUE TO EXCESS CALORIES WITH SERIOUS COMORBIDITY AND BODY MASS INDEX (BMI) OF 38.0 TO 38.9 IN ADULT: Primary | ICD-10-CM

## 2022-03-24 DIAGNOSIS — E66.9 OBESITY, CLASS II, BMI 35-39.9: ICD-10-CM

## 2022-03-24 DIAGNOSIS — Z98.84 STATUS POST BARIATRIC SURGERY: Primary | ICD-10-CM

## 2022-03-24 DIAGNOSIS — E11.9 TYPE 2 DIABETES MELLITUS WITHOUT COMPLICATION, WITHOUT LONG-TERM CURRENT USE OF INSULIN: ICD-10-CM

## 2022-03-24 DIAGNOSIS — E55.9 HYPOVITAMINOSIS D: ICD-10-CM

## 2022-03-24 DIAGNOSIS — Z90.3 POSTGASTRECTOMY MALABSORPTION: ICD-10-CM

## 2022-03-24 DIAGNOSIS — K91.2 POSTGASTRECTOMY MALABSORPTION: ICD-10-CM

## 2022-03-24 DIAGNOSIS — R53.83 FATIGUE, UNSPECIFIED TYPE: ICD-10-CM

## 2022-03-24 DIAGNOSIS — Z13.0 SCREENING, IRON DEFICIENCY ANEMIA: ICD-10-CM

## 2022-03-24 PROCEDURE — 99214 OFFICE O/P EST MOD 30 MIN: CPT | Performed by: PHYSICIAN ASSISTANT

## 2022-03-24 PROCEDURE — 99213 OFFICE O/P EST LOW 20 MIN: CPT | Performed by: NURSE PRACTITIONER

## 2022-03-24 NOTE — PROGRESS NOTES
Office Note      Date: 2022  Patient Name: Eloise Delatorre  MRN: [id number]  : 1975       Chief Complaint  Obesity and Follow-up  Subjective  Subjective           Eloise Delatorre presents to [facility] for obesity management. s/p LSG/liver bx (steatohepatitis) 20 w/ Dr. Guzman. Presurgery weight: 281 pounds (~250lb per patient). Goal 180lb.   Patient is unsatisfied with weight loss progress. Appetite is moderately controlled. Reports no side effects of prescribed medications today. Topamax and wegovy have not been available at the pharmacy, she got a call today and thinks those are now ready. Just using saxenda and metformin at this time. The patient is taking multivitamin and is taking fish oil.  The patient is using a food journal. Making changes: less bread, avoiding evening snack, cutting out coke, increasing water.  is also trying to make healthier choices. Her hip pain still inhibits her from moderate intensity exercise, she has a consult with ortho to discuss options this week.       Review of Systems   Constitutional: Negative for fatigue.   Eyes: Negative for visual disturbance.   Cardiovascular: Negative for chest pain and palpitations.   Gastrointestinal: Negative for abdominal pain, constipation, diarrhea, nausea and GERD.   Neurological: Negative for dizziness, tremors, light-headedness, headache and memory problem.        Parasthesias negative   Psychiatric/Behavioral: Negative for sleep disturbance and depressed mood. The patient is not nervous/anxious.        Objective   Start weight MWM: 227 pounds.    Total Loss/%Loss of BBW: -2.20%  Change in weight since last visit: -5.5 lb  Body mass index is 37.52 kg/m².   Body composition analysis completed and showed:   %body fat: 47.9  Measurements (in inches)  Waist: 50  Vital Signs:   /68 (BP Location: Right arm, Patient Position: Sitting, Cuff Size: Adult)   Pulse 94   Temp  "96.8 °F (36 °C) (Temporal)   Resp 18   Ht 163.8 cm (64.5\")   Wt 101 kg (222 lb)   SpO2 98%   BMI 37.52 kg/m²     Physical Exam   General appears stated age, normal appearance and central obesity   HEENT PERRLA, EOM intact and conjunctivae normal   Chest/lungs Normal rate, Regular rhythm and Breathing is unlabored   Extremities without edema   Neuro Good historian and No focal deficit   Skin Warm, dry, intact   Psych normal behavior, normal thought content and normal concentration     Result Review :                Assessment / Plan        Diagnoses and all orders for this visit:    1. Class 2 severe obesity due to excess calories with serious comorbidity and body mass index (BMI) of 38.0 to 38.9 in adult (HCC) (Primary)  Assessment & Plan:  Patient's (Body mass index is 37.52 kg/m².) indicates that they are morbidly obese (BMI > 40 or > 35 with obesity - related health condition) with health conditions that include hypertension, diabetes mellitus, dyslipidemias, GERD and osteoarthritis . Weight is improving with treatment. BMI is is above average; BMI management plan is completed. We discussed low calorie, low carb based diet program, portion control, increasing exercise, management of depression/anxiety/stress to control compensatory eating, pharmacologic options including saxenda (wegovy when available), topiramate, metformin and an sally-based approach such as Corvalius Pal or Lose It.    I have instructed the patient to continue with pursuit of medical weight loss as a part of this program. Patient does meet criteria for use of anorectics at this time as BMI >27 and is not at treatment goal.     Continue nutritional focus and work towards new exercise FITT goal of: 2-1-1-2. Increase as tolerated.   The current plan for this month includes: continue to work on lifestyle behavioral changes. Add back topamax. Switch to wegovy if available (understands this will replace saxenda). Continue metformin. Goal 6-8lb. "          2. Type 2 diabetes mellitus without complication, without long-term current use of insulin (HCC)  Assessment & Plan:  Diabetes is stable.   Continue current treatment regimen. Switch saxenda to wegovy and titrate up as discussed.   Diabetes will be reassessed at next appointment.          Follow Up   Return in about 4 weeks (around 4/21/2022) for Next scheduled follow up.  Patient was given instructions and counseling regarding her condition or for health maintenance advice. Please see specific information pulled into the AVS if appropriate.     Katy Dobson, GIANA  03/24/2022

## 2022-03-24 NOTE — PROGRESS NOTES
[facility]   [address]  [address]  [facility] [id number] [phone]        Patient Name:  Eloise Delatorre.  :  1975        Reason for Visit:  2 years postop      HPI: Eloise Delatorre is a 46 y.o. female s/p LSG/liver bx (steatohepatitis) 20 w/ Dr. Guzman.     Doing well.  Has been following with Katy with medical weight management, she is pleased with her progress so far. She has implemented several small changes and notices a difference with each one. Reflux is intolerable if not taking her omeprazole. It is well controlled with taking medication on a daily basis.  No other issues/concerns. Denies dysphagia, nausea, vomiting, abdominal pain, pulmonary issues and fevers.  Eating 3 times a day with no snacking.   Not tracking intake but is trying to make healhier choices. Doesn't like protein supplements, uses powder occasionally.  Drinking 64+ fluid oz/day.  Last labs revealed bariatric levels wnl, glucose 48 .  Taking MVI.  On Omeprazole .  Has been more active.     Presurgery weight: 281 pounds.  Today's weight is 101 kg (222 lb) pounds, today's  Body mass index is 37.52 kg/m²., and@ weight loss since surgery is 59 pounds.      Past Medical History:   Diagnosis Date   • Anemia    • Anemia    • Anxiety     no meds, previously on Zoloft, avoids d/t weight gain   • Arthritis    • Asthma     allergy induced, prn inhalers   • Chronic back pain     Baclofen + Ibuprofen, denies steroid injections d/t weight gain   • Chronic pain disorder     back pain   • Depression    • Dyspepsia     w/ postprandial nausea + abd.pain   • Dyspnea on exertion    • Fatigue    • Fatty liver    • Fibromyalgia    • Frequent UTI    • GERD (gastroesophageal reflux disease)     BID PPI, denies prior eval   • Heart burn    • Hyperlipidemia    • Hypertension    • Hypertriglyceridemia    • Iron deficiency     in the past   • Morbid obesity (HCC)    • Peptic ulceration    •  Psoriasis    • Sleep apnea     PATIENT HAS NEVER HAD A SLEEP STUDY   • Type 2 diabetes mellitus (HCC)     never on insulin, does not check FSBS, A1c 5.9 10/2021   • Vitamin D deficiency    • Wears glasses      Past Surgical History:   Procedure Laterality Date   • BACK SURGERY  2012    L4-L5   • BLADDER SUSPENSION  2009    during hysterectomy   • BREAST BIOPSY      Left   • BREAST CYST ASPIRATION     • BREAST CYST ASPIRATION     • BREAST CYST ASPIRATION     • BREAST CYST ASPIRATION     • BREAST SURGERY     • COLONOSCOPY  2015    unremarkable, but w/ hx polyps   • DIAGNOSTIC LAPAROSCOPY  1994    x 8 ([id number]) for endometriosis   • ENDOSCOPY     • ENDOSCOPY N/A 02/25/2020    Procedure: ESOPHAGOGASTRODUODENOSCOPY;  Surgeon: Lila Guzman MD;  Location:  [facility];  Service: Bariatric;  Laterality: N/A;   • GASTRIC SLEEVE LAPAROSCOPIC N/A 02/25/2020    Procedure: GASTRIC SLEEVE LAPAROSCOPIC;  Surgeon: Lila Guzman MD;  Location:  [facility];  Service: Bariatric;  Laterality: N/A;   • HEMORRHOIDECTOMY     • LIVER BIOPSY N/A 02/25/2020    Procedure: LIVER BIOPSY LAPAROSCOPIC;  Surgeon: Lila Guzman MD;  Location:  [facility];  Service: Bariatric;  Laterality: N/A;   • TOTAL LAPAROSCOPIC HYSTERECTOMY  2009    for endometriosis   • WISDOM TOOTH EXTRACTION  1992     Outpatient Medications Marked as Taking for the 3/24/22 encounter (Office Visit) with Fawn Acuña PA-C   Medication Sig Dispense Refill   • Apoaequorin 10 MG capsule Take 10 mg by mouth Daily As Needed.     • atorvastatin (LIPITOR) 20 MG tablet 20 mg.     • azelastine (ASTELIN) 0.1 % nasal spray 2 sprays into the nostril(s) as directed by provider.     • Cholecalciferol (Vitamin D) 50 MCG (2000 UT) tablet Take 2,000 Units by mouth Daily. 90 tablet 2   • fenofibric acid (TRILIPIX) 135 MG capsule delayed-release delayed release capsule Take 135 mg by mouth Daily.     • Insulin Pen Needle 32G X 4 MM misc 1 dose Daily. 90 each 0   •  "lactobacillus acidophilus (RISAQUAD) capsule capsule Take  by mouth.     • Liraglutide (SAXENDA) 18 MG/3ML injection pen Inject 3 mg under the skin into the appropriate area as directed Daily for 90 days. 15 pen 0   • lisinopril (PRINIVIL,ZESTRIL) 10 MG tablet Take 10 mg by mouth Daily.     • Loratadine-Pseudoephedrine (CLARITIN-D 12 HOUR PO) Take 1 tablet by mouth Daily.     • metFORMIN ER (GLUCOPHAGE-XR) 500 MG 24 hr tablet Take 1 tablet by mouth Daily. Take 3 tablets by mouth daily with food. 90 tablet 2   • Multiple Vitamins-Minerals (CENTRUM ULTRA WOMENS) tablet Take 1 tablet by mouth Daily. patch     • omeprazole (priLOSEC) 40 MG capsule Take 1 capsule by mouth Daily. 90 capsule 0   • Semaglutide-Weight Management 1.7 MG/0.75ML solution auto-injector Inject 0.75 mL under the skin into the appropriate area as directed 1 (One) Time Per Week. 9 mL 0       No Known Allergies    Social History     Socioeconomic History   • Marital status:    Tobacco Use   • Smoking status: Former Smoker     Packs/day: 3.00     Years: 20.00     Pack years: 60.00     Types: Cigarettes     Quit date: 2009     Years since quittin.2   • Smokeless tobacco: Never Used   Substance and Sexual Activity   • Alcohol use: No   • Drug use: No   • Sexual activity: Not Currently     Partners: Male     Birth control/protection: Abstinence, None     Comment: Hysterectomy       /68   Pulse 94   Ht 163.8 cm (64.5\")   Wt 101 kg (222 lb)   BMI 37.52 kg/m²     Physical Exam  Constitutional:       Appearance: She is well-developed. She is obese.   HENT:      Head: Normocephalic and atraumatic.   Cardiovascular:      Rate and Rhythm: Normal rate and regular rhythm.   Pulmonary:      Effort: Pulmonary effort is normal.      Breath sounds: Normal breath sounds.   Abdominal:      General: Bowel sounds are normal.      Palpations: Abdomen is soft.   Skin:     General: Skin is warm and dry.   Neurological:      Mental Status: She is " alert.   Psychiatric:         Mood and Affect: Mood normal.         Behavior: Behavior normal.         Thought Content: Thought content normal.         Judgment: Judgment normal.           Assessment: 2 years  s/p LSG/liver bx (steatohepatitis) 2/25/20 w/ Dr. Guzman.     ICD-10-CM ICD-9-CM   1. Status post bariatric surgery  Z98.84 V45.86   2. Obesity, Class II, BMI 35-39.9  E66.9 278.00   3. Malnutrition screen  Z13.21 V77.2   4. Screening, iron deficiency anemia  Z13.0 V78.0   5. Hypovitaminosis D  E55.9 268.9   6. Fatigue, unspecified type  R53.83 780.79   7. Postgastrectomy malabsorption  K91.2 579.3    Z90.3          Plan:  Doing well. Continue care with medical weight management.  Continue PPI as symptoms are controlled with medication management. Continue w/ good food choices and healthy habits.  Continue to focus on high protein, low carb.  Keep tracking intake.  Continue routine exercise.  Routine bariatric labs ordered.  Continue vitamins w/ adjustments pending lab results.  Call w/ problems/concerns.     Patient's Body mass index is 37.52 kg/m². indicating that she is morbidly obese (BMI > 40 or > 35 with obesity - related health condition). Obesity-related health conditions include the following: as above. Obesity is worsening. BMI is is above average; BMI management plan is completed. We discussed low calorie, low carb based diet program, portion control and increasing exercise..        The patient was instructed to follow up in 1 year,  sooner if needed.

## 2022-03-24 NOTE — ASSESSMENT & PLAN NOTE
Patient's (Body mass index is 37.52 kg/m².) indicates that they are morbidly obese (BMI > 40 or > 35 with obesity - related health condition) with health conditions that include hypertension, diabetes mellitus, dyslipidemias, GERD and osteoarthritis . Weight is improving with treatment. BMI is is above average; BMI management plan is completed. We discussed low calorie, low carb based diet program, portion control, increasing exercise, management of depression/anxiety/stress to control compensatory eating, pharmacologic options including saxenda (wegovy when available), topiramate, metformin and an sally-based approach such as [employer] Pal or Lose It.    I have instructed the patient to continue with pursuit of medical weight loss as a part of this program. Patient does meet criteria for use of anorectics at this time as BMI >27 and is not at treatment goal.     Continue nutritional focus and work towards new exercise FITT goal of: 2-1-1-2. Increase as tolerated.   The current plan for this month includes: continue to work on lifestyle behavioral changes. Add back topamax. Switch to wegovy if available (understands this will replace saxenda). Continue metformin. Goal 6-8lb.

## 2022-03-24 NOTE — ASSESSMENT & PLAN NOTE
Diabetes is stable.   Continue current treatment regimen. Switch saxenda to wegovy and titrate up as discussed.   Diabetes will be reassessed at next appointment.

## 2022-04-04 LAB
25(OH)D3+25(OH)D2 SERPL-MCNC: 56.5 NG/ML (ref 30–100)
ALBUMIN SERPL-MCNC: 4.4 G/DL (ref 3.5–5.2)
ALBUMIN/GLOB SERPL: 2.2 G/DL
ALP SERPL-CCNC: 83 U/L (ref 39–117)
ALT SERPL-CCNC: 22 U/L (ref 1–33)
AST SERPL-CCNC: 15 U/L (ref 1–32)
BASOPHILS # BLD AUTO: 0.05 10*3/MM3 (ref 0–0.2)
BASOPHILS NFR BLD AUTO: 0.5 % (ref 0–1.5)
BILIRUB SERPL-MCNC: 0.3 MG/DL (ref 0–1.2)
BUN SERPL-MCNC: 15 MG/DL (ref 6–20)
BUN/CREAT SERPL: 20.3 (ref 7–25)
CALCIUM SERPL-MCNC: 9.8 MG/DL (ref 8.6–10.5)
CHLORIDE SERPL-SCNC: 107 MMOL/L (ref 98–107)
CO2 SERPL-SCNC: 25.6 MMOL/L (ref 22–29)
CREAT SERPL-MCNC: 0.74 MG/DL (ref 0.57–1)
EGFRCR SERPLBLD CKD-EPI 2021: 100.6 ML/MIN/1.73
EOSINOPHIL # BLD AUTO: 0.12 10*3/MM3 (ref 0–0.4)
EOSINOPHIL NFR BLD AUTO: 1.3 % (ref 0.3–6.2)
ERYTHROCYTE [DISTWIDTH] IN BLOOD BY AUTOMATED COUNT: 15 % (ref 12.3–15.4)
FERRITIN SERPL-MCNC: 40.7 NG/ML (ref 13–150)
FOLATE SERPL-MCNC: >20 NG/ML (ref 4.78–24.2)
GLOBULIN SER CALC-MCNC: 2 GM/DL
GLUCOSE SERPL-MCNC: 79 MG/DL (ref 65–99)
HCT VFR BLD AUTO: 46.1 % (ref 34–46.6)
HGB BLD-MCNC: 14.7 G/DL (ref 12–15.9)
IMM GRANULOCYTES # BLD AUTO: 0.08 10*3/MM3 (ref 0–0.05)
IMM GRANULOCYTES NFR BLD AUTO: 0.8 % (ref 0–0.5)
IRON SERPL-MCNC: 70 MCG/DL (ref 37–145)
LYMPHOCYTES # BLD AUTO: 2.72 10*3/MM3 (ref 0.7–3.1)
LYMPHOCYTES NFR BLD AUTO: 28.8 % (ref 19.6–45.3)
MCH RBC QN AUTO: 27 PG (ref 26.6–33)
MCHC RBC AUTO-ENTMCNC: 31.9 G/DL (ref 31.5–35.7)
MCV RBC AUTO: 84.7 FL (ref 79–97)
METHYLMALONATE SERPL-SCNC: 139 NMOL/L (ref 0–378)
MONOCYTES # BLD AUTO: 1 10*3/MM3 (ref 0.1–0.9)
MONOCYTES NFR BLD AUTO: 10.6 % (ref 5–12)
NEUTROPHILS # BLD AUTO: 5.48 10*3/MM3 (ref 1.7–7)
NEUTROPHILS NFR BLD AUTO: 58 % (ref 42.7–76)
PLATELET # BLD AUTO: 245 10*3/MM3 (ref 140–450)
POTASSIUM SERPL-SCNC: 4.5 MMOL/L (ref 3.5–5.2)
PREALB SERPL-MCNC: 34 MG/DL (ref 12–34)
PROT SERPL-MCNC: 6.4 G/DL (ref 6–8.5)
RBC # BLD AUTO: 5.44 10*6/MM3 (ref 3.77–5.28)
SODIUM SERPL-SCNC: 144 MMOL/L (ref 136–145)
VIT B1 BLD-SCNC: 274.9 NMOL/L (ref 66.5–200)
WBC # BLD AUTO: 9.45 10*3/MM3 (ref 3.4–10.8)

## 2022-04-15 DIAGNOSIS — E11.9 TYPE 2 DIABETES MELLITUS WITHOUT COMPLICATION, WITHOUT LONG-TERM CURRENT USE OF INSULIN: ICD-10-CM

## 2022-04-15 DIAGNOSIS — E66.01 MORBID OBESITY: ICD-10-CM

## 2022-04-21 RX ORDER — METFORMIN HYDROCHLORIDE 500 MG/1
TABLET, EXTENDED RELEASE ORAL
Qty: 270 TABLET | Refills: 0 | Status: SHIPPED | OUTPATIENT
Start: 2022-04-21 | End: 2022-07-14

## 2022-04-21 RX ORDER — PEN NEEDLE, DIABETIC 32GX 5/32"
NEEDLE, DISPOSABLE MISCELLANEOUS
Qty: 100 EACH | Refills: 0 | Status: SHIPPED | OUTPATIENT
Start: 2022-04-21 | End: 2022-08-23

## 2022-06-01 ENCOUNTER — TELEMEDICINE (OUTPATIENT)
Dept: BARIATRICS/WEIGHT MGMT | Facility: CLINIC | Age: 47
End: 2022-06-01

## 2022-06-01 VITALS
WEIGHT: 215 LBS | DIASTOLIC BLOOD PRESSURE: 78 MMHG | HEIGHT: 65 IN | BODY MASS INDEX: 35.82 KG/M2 | SYSTOLIC BLOOD PRESSURE: 116 MMHG

## 2022-06-01 DIAGNOSIS — E11.9 TYPE 2 DIABETES MELLITUS WITHOUT COMPLICATION, WITHOUT LONG-TERM CURRENT USE OF INSULIN: ICD-10-CM

## 2022-06-01 DIAGNOSIS — E66.01 CLASS 2 SEVERE OBESITY DUE TO EXCESS CALORIES WITH SERIOUS COMORBIDITY AND BODY MASS INDEX (BMI) OF 36.0 TO 36.9 IN ADULT: Primary | ICD-10-CM

## 2022-06-01 PROCEDURE — 99214 OFFICE O/P EST MOD 30 MIN: CPT | Performed by: NURSE PRACTITIONER

## 2022-06-01 RX ORDER — SEMAGLUTIDE 2.4 MG/.75ML
2.4 INJECTION, SOLUTION SUBCUTANEOUS WEEKLY
Qty: 9 ML | Refills: 0 | Status: SHIPPED | OUTPATIENT
Start: 2022-06-01 | End: 2022-08-02 | Stop reason: RX

## 2022-06-01 RX ORDER — PREDNISONE 20 MG/1
TABLET ORAL
COMMUNITY
Start: 2022-05-23 | End: 2022-08-30

## 2022-06-01 NOTE — PROGRESS NOTES
"     Office Note      Date: 2022  Patient Name: Eloise Delatorre  MRN: [id number]  : 1975    Patient presents during the COVID-19 pandemic/federally declared [facility] of public health emergency.  This service was conducted via Zoom.  Patient is located at her home.  Provider is located at her primary office location. The use of a video visit has been reviewed with the patient and verbal informed consent has been obtained.  Subjective  Subjective     Chief Complaint  Obesity Management follow-up    Subjective          Eloise Delatorre presents to [facility] WEIGHT MANAGEMENT via telehealth for obesity management. s/p LSG/liver bx (steatohepatitis) 20 w/ Dr. Guzman. Presurgery weight: 281 pounds (~250lb per patient). Goal 180lb.   Was started on prednisone for rash on her neck, thought to be environmental allergy. Has one more dose of prednisone. Also on mucinex.     Patient is satisfied with weight loss progress. Appetite is well controlled. Using noom off and on. Feels like she does well for a few days with limiting bread. Knows she could do better. Not feeling deprived and feels like she can stick with her diet better if she allows for some bread. Reports no side effects of prescribed medications today other than some nausea on the day she does her wegovy injection. Anti-nausea medicine helps. She has completed 2 doses of the wegovy 1.7mg.  Blood sugar has been \"normal\" in the 120s, patient is recording almost daily. No high or low readings or symptoms. The patient is exercising. Wants to get outside more but allergies are preventing. Is doing housework and some arm weights. Daughter does [employer], she's thinking about joining. Patient's goal is to exercise every day 15 minutes weight training in addition to her 10,000 steps a day. Also walks her steps indoor if she has to. Hasn't been getting it lately but plans to increase.     Review of Systems   Constitutional: " "Positive for fatigue (allergy and allergy medication related).   Eyes: Negative for visual disturbance.   Cardiovascular: Negative for chest pain and palpitations.   Gastrointestinal: Positive for GERD (rare). Negative for abdominal pain, constipation, diarrhea and nausea.   Endocrine: Negative for polydipsia, polyphagia and polyuria.   Neurological: Negative for dizziness, tremors, light-headedness, headache and memory problem.        Parasthesias negative   Psychiatric/Behavioral: Negative for sleep disturbance and depressed mood. The patient is not nervous/anxious.      Objective   Body mass index is 36.33 kg/m².  Start weight: 227 pounds.    Total weight loss: -12 pounds/-5%  Change in weight since last visit: -7lb    /78   Ht 163.8 cm (64.5\")   Wt 97.5 kg (215 lb)   BMI 36.33 kg/m²       Result Review :                 Assessment and Plan    Diagnoses and all orders for this visit:    1. Class 2 severe obesity due to excess calories with serious comorbidity and body mass index (BMI) of 36.0 to 36.9 in adult (HCC) (Primary)  Assessment & Plan:  Patient's (Body mass index is 36.33 kg/m².) indicates that they are morbidly obese (BMI > 40 or > 35 with obesity - related health condition) with health conditions that include obstructive sleep apnea, diabetes mellitus, dyslipidemias, GERD and osteoarthritis . Weight is improving with treatment. BMI is is above average; BMI management plan is completed. We discussed low calorie, low carb based diet program, portion control, increasing exercise, joining a fitness center or start home based exercise program, pharmacologic options including wegovy and an sally-based approach such as [employer] Pal or Lose It.    I have instructed the patient to continue with pursuit of medical weight loss as a part of this program. Patient does meet criteria for use of anorectics at this time as BMI >27 and is not at treatment goal.     Continue nutritional focus and work towards new " exercise FITT goal of: 2-2-4-2. Continue with current goals, encouraged her to join [employer] for indoor/joint friendly options.   The current plan for this month includes: continue to work on lifestyle behavioral changes. Titrate up on Wegovy to 2.4mg weekly. Goal 7lb/month.        Orders:  -     Semaglutide-Weight Management (Wegovy) 2.4 MG/0.75ML solution auto-injector; Inject 2.4 mg under the skin into the appropriate area as directed 1 (One) Time Per Week.  Dispense: 9 mL; Refill: 0    2. Type 2 diabetes mellitus without complication, without long-term current use of insulin (HCC)  Assessment & Plan:  Diabetes is improving with treatment.   Continue current treatment regimen.  Regular aerobic exercise.  Diabetes will be reassessed at next follow up.      I spent 30 minutes caring for Eloise on this date of service. This time includes time spent by me in the following activities:preparing for the visit, obtaining and/or reviewing a separately obtained history, performing a medically appropriate examination and/or evaluation , counseling and educating the patient/family/caregiver, ordering medications, tests, or procedures and documenting information in the medical record  Follow Up   Return in about 7 weeks (around 7/20/2022) for Next scheduled follow up.  Patient was given instructions and counseling regarding her condition or for health maintenance advice. Please see specific information pulled into the AVS if appropriate.     GIANA Bell

## 2022-06-01 NOTE — ASSESSMENT & PLAN NOTE
Patient's (Body mass index is 36.33 kg/m².) indicates that they are morbidly obese (BMI > 40 or > 35 with obesity - related health condition) with health conditions that include obstructive sleep apnea, diabetes mellitus, dyslipidemias, GERD and osteoarthritis . Weight is improving with treatment. BMI is is above average; BMI management plan is completed. We discussed low calorie, low carb based diet program, portion control, increasing exercise, joining a fitness center or start home based exercise program, pharmacologic options including wegovy and an sally-based approach such as [employer] Pal or Lose It.    I have instructed the patient to continue with pursuit of medical weight loss as a part of this program. Patient does meet criteria for use of anorectics at this time as BMI >27 and is not at treatment goal.     Continue nutritional focus and work towards new exercise FITT goal of: 2-2-4-2. Continue with current goals, encouraged her to join [employer] for indoor/joint friendly options.   The current plan for this month includes: continue to work on lifestyle behavioral changes. Titrate up on Wegovy to 2.4mg weekly. Goal 7lb/month.

## 2022-06-01 NOTE — ASSESSMENT & PLAN NOTE
Diabetes is improving with treatment.   Continue current treatment regimen.  Regular aerobic exercise.  Diabetes will be reassessed at next follow up.

## 2022-07-14 DIAGNOSIS — E11.9 TYPE 2 DIABETES MELLITUS WITHOUT COMPLICATION, WITHOUT LONG-TERM CURRENT USE OF INSULIN: ICD-10-CM

## 2022-07-14 RX ORDER — METFORMIN HYDROCHLORIDE 500 MG/1
TABLET, EXTENDED RELEASE ORAL
Qty: 270 TABLET | Refills: 0 | Status: SHIPPED | OUTPATIENT
Start: 2022-07-14 | End: 2022-10-10

## 2022-07-29 DIAGNOSIS — E66.01 MORBID OBESITY: ICD-10-CM

## 2022-08-02 RX ORDER — SEMAGLUTIDE 1.7 MG/.75ML
INJECTION, SOLUTION SUBCUTANEOUS
Qty: 3 ML | Refills: 0 | Status: SHIPPED | OUTPATIENT
Start: 2022-08-02 | End: 2022-08-30 | Stop reason: DRUGHIGH

## 2022-08-22 DIAGNOSIS — E11.9 TYPE 2 DIABETES MELLITUS WITHOUT COMPLICATION, WITHOUT LONG-TERM CURRENT USE OF INSULIN: ICD-10-CM

## 2022-08-22 DIAGNOSIS — E66.01 MORBID OBESITY: ICD-10-CM

## 2022-08-23 RX ORDER — PEN NEEDLE, DIABETIC 32GX 5/32"
NEEDLE, DISPOSABLE MISCELLANEOUS
Qty: 100 EACH | Refills: 0 | Status: SHIPPED | OUTPATIENT
Start: 2022-08-23 | End: 2022-08-30

## 2022-08-30 ENCOUNTER — TELEMEDICINE (OUTPATIENT)
Dept: BARIATRICS/WEIGHT MGMT | Facility: CLINIC | Age: 47
End: 2022-08-30

## 2022-08-30 VITALS — HEIGHT: 65 IN | WEIGHT: 207 LBS | BODY MASS INDEX: 34.49 KG/M2

## 2022-08-30 DIAGNOSIS — E11.9 TYPE 2 DIABETES MELLITUS WITHOUT COMPLICATION, WITHOUT LONG-TERM CURRENT USE OF INSULIN: ICD-10-CM

## 2022-08-30 DIAGNOSIS — E66.09 CLASS 1 OBESITY DUE TO EXCESS CALORIES WITH SERIOUS COMORBIDITY AND BODY MASS INDEX (BMI) OF 34.0 TO 34.9 IN ADULT: Primary | ICD-10-CM

## 2022-08-30 PROCEDURE — 99213 OFFICE O/P EST LOW 20 MIN: CPT | Performed by: NURSE PRACTITIONER

## 2022-08-30 RX ORDER — DOCUSATE SODIUM 100 MG/1
100 CAPSULE, LIQUID FILLED ORAL 4 TIMES DAILY
COMMUNITY

## 2022-08-30 NOTE — ASSESSMENT & PLAN NOTE
Diabetes is stable.   Continue current treatment regimen.  Regular aerobic exercise.  Diabetes will be reassessed in 3 months.

## 2022-08-30 NOTE — ASSESSMENT & PLAN NOTE
Patient's (Body mass index is 34.98 kg/m².) indicates that they are obese (BMI >30) with health conditions that include obstructive sleep apnea, hypertension, diabetes mellitus, dyslipidemias and GERD . Weight is improving with treatment. BMI is is above average; BMI management plan is completed. We discussed low calorie, low carb based diet program, portion control, increasing exercise, management of depression/anxiety/stress to control compensatory eating, pharmacologic options including wegovy and an sally-based approach such as [employer] Pal or Lose It.    I have instructed the patient to continue with pursuit of medical weight loss as a part of this program. Patient does meet criteria for use of anorectics at this time as BMI >27 and is not at treatment goal.     Continue nutritional focus and work towards new exercise FITT goal of: 2-2-4-2.  The current plan for this month includes: continue to work on lifestyle behavioral changes with focus on adequate protein, daily food journal, regular physical activity. Increase wegovy to 2.4mg and return to clinic in 3 months.

## 2022-08-30 NOTE — PROGRESS NOTES
Office Note      Date: 2022  Patient Name: Eloise Delatorre  MRN: [id number]  : 1975    Patient presents during the COVID-19 pandemic/federally declared [facility] of public health emergency.  This service was conducted via Zoom.  Patient is located at her home address.  Provider is located at her primary office location. The use of a video visit has been reviewed with the patient and verbal informed consent has been obtained.  Subjective  Subjective     Chief Complaint  Obesity Management follow-up    Subjective          Eloise Delatorre presents to [facility] WEIGHT MANAGEMENT via telehealth for obesity management. s/p LSG/liver bx (steatohepatitis) 20 w/ Dr. Guzman  Presurgery weight: 281 pounds (~250lb per patient).     Patient is satisfied with weight loss progress. Appetite is well controlled. Reports no side effects of prescribed medications today. She would like to try the next dose up on wegovy (was unable to fill it last time due to availability). The patient is exercising. Uses noom but inconsistently. Denies symptoms of low blood sugar, no change in her fasting blood glucose.     Review of Systems   Constitutional: Negative for appetite change and fatigue.   Eyes: Positive for visual disturbance (chronic). Negative for blurred vision.   Cardiovascular: Negative for chest pain, palpitations and leg swelling.   Gastrointestinal: Positive for nausea (chronic), GERD (chronic) and indigestion. Negative for abdominal pain, constipation, diarrhea and vomiting.   Endocrine: Positive for polydipsia. Negative for polyphagia and polyuria.   Musculoskeletal: Negative for arthralgias, back pain and myalgias.   Neurological: Positive for weakness. Negative for dizziness, tremors, light-headedness, headache and memory problem.        Parasthesias negative   Psychiatric/Behavioral: Negative for sleep disturbance and depressed mood. The patient is not nervous/anxious.   "    Objective   Body mass index is 34.98 kg/m².  Start weight: 227 pounds.    Total weight loss: -20 pounds/-8%  Change in weight since last visit: -6-8lb    Ht 163.8 cm (64.5\")   Wt 93.9 kg (207 lb)   BMI 34.98 kg/m²       Result Review :   The following data was reviewed by: GIANA Auguste on 08/30/2022:  Common labs    Common Labsle 12/6/21 12/6/21 3/24/22 3/24/22 7/27/22 7/27/22 7/27/22 7/27/22    1536 1536 1006 1006 1015 1015 1015 1015   Glucose    79       BUN    15       Creatinine    0.74       Sodium    144       Potassium    4.5       Chloride    107       Calcium    9.8       Total Protein    6.4       Albumin    4.40   4.3    Total Bilirubin    0.3   0.3    Alkaline Phosphatase    83   63    AST (SGOT)    15   15    ALT (SGPT)    22   22    WBC   9.45  9.1      Hemoglobin   14.7  12.9      Hematocrit   46.1  41.1      Platelets   245  255      Total Cholesterol 140       128   Triglycerides 139       127   HDL Cholesterol 48       46   LDL Cholesterol  68       60   Hemoglobin A1C  6.0 (A)    5.9 (A)     (A) Abnormal value       Comments are available for some flowsheets but are not being displayed.           Most Recent A1C    HGBA1C Most Recent 7/27/22   Hemoglobin A1C 5.9 (A)   (A) Abnormal value                      Assessment and Plan {CC Problem List  Visit Diagnosis  ROS  Review (Popup)  Health Maintenance  Quality  BestPractice  Medications  SmartSets  SnapShot Encounters  Media :23}   Diagnoses and all orders for this visit:    1. Class 1 obesity due to excess calories with serious comorbidity and body mass index (BMI) of 34.0 to 34.9 in adult (Primary)  Assessment & Plan:  Patient's (Body mass index is 34.98 kg/m².) indicates that they are obese (BMI >30) with health conditions that include obstructive sleep apnea, hypertension, diabetes mellitus, dyslipidemias and GERD . Weight is improving with treatment. BMI is is above average; BMI management plan is completed. We " discussed low calorie, low carb based diet program, portion control, increasing exercise, management of depression/anxiety/stress to control compensatory eating, pharmacologic options including wegovy and an sally-based approach such as [employer] Pal or Lose It.    I have instructed the patient to continue with pursuit of medical weight loss as a part of this program. Patient does meet criteria for use of anorectics at this time as BMI >27 and is not at treatment goal.     Continue nutritional focus and work towards new exercise FITT goal of: 2-2-4-2.  The current plan for this month includes: continue to work on lifestyle behavioral changes with focus on adequate protein, daily food journal, regular physical activity. Increase wegovy to 2.4mg and return to clinic in 3 months.        Orders:  -     Semaglutide-Weight Management 2.4 MG/0.75ML solution auto-injector; Inject 2.4 mg under the skin into the appropriate area as directed 1 (One) Time Per Week.  Dispense: 3 mL; Refill: 0    2. Type 2 diabetes mellitus without complication, without long-term current use of insulin ([facility])  Assessment & Plan:  Diabetes is stable.   Continue current treatment regimen.  Regular aerobic exercise.  Diabetes will be reassessed in 3 months.          Follow Up   Return in about 3 months (around 11/30/2022) for Next scheduled follow up.  Patient was given instructions and counseling regarding her condition or for health maintenance advice. Please see specific information pulled into the AVS if appropriate.     GIANA Bell

## 2022-10-02 DIAGNOSIS — E66.09 CLASS 1 OBESITY DUE TO EXCESS CALORIES WITH SERIOUS COMORBIDITY AND BODY MASS INDEX (BMI) OF 34.0 TO 34.9 IN ADULT: ICD-10-CM

## 2022-10-03 RX ORDER — SEMAGLUTIDE 2.4 MG/.75ML
INJECTION, SOLUTION SUBCUTANEOUS
Qty: 3 ML | Refills: 2 | Status: SHIPPED | OUTPATIENT
Start: 2022-10-03 | End: 2022-11-03 | Stop reason: SDUPTHER

## 2022-10-03 NOTE — TELEPHONE ENCOUNTER
Rx Refill Note  Requested Prescriptions     Pending Prescriptions Disp Refills   • Wegovy 2.4 MG/0.75ML solution auto-injector [Pharmacy Med Name: WEGOVY 2.4 MG/0.75 ML PEN]       Sig: INJECT 2.4 MG UNDER THE SKIN INTO THE APPROPRIATE AREA AS DIRECTED 1 (ONE) TIME PER WEEK.      Last office visit with prescribing clinician: 8/30/22  Next office visit with prescribing clinician: Visit date not found            Praveena Mcintyre MA  10/03/22, 13:32 EDT

## 2022-10-08 DIAGNOSIS — E11.9 TYPE 2 DIABETES MELLITUS WITHOUT COMPLICATION, WITHOUT LONG-TERM CURRENT USE OF INSULIN: ICD-10-CM

## 2022-10-10 RX ORDER — METFORMIN HYDROCHLORIDE 500 MG/1
TABLET, EXTENDED RELEASE ORAL
Qty: 270 TABLET | Refills: 0 | Status: SHIPPED | OUTPATIENT
Start: 2022-10-10 | End: 2023-01-05

## 2022-10-10 NOTE — TELEPHONE ENCOUNTER
Rx Refill Note  Requested Prescriptions     Pending Prescriptions Disp Refills   • metFORMIN ER (GLUCOPHAGE-XR) 500 MG 24 hr tablet [Pharmacy Med Name: METFORMIN HCL  MG TABLET] 270 tablet 0     Sig: TAKE 3 TABLETS BY MOUTH DAILY WITH FOOD.      Last office visit with prescribing clinician: 3/24/2022      Next office visit with prescribing clinician: 11/3/2022            Praveena Mcintyre MA  10/10/22, 11:23 EDT

## 2022-11-03 ENCOUNTER — OFFICE VISIT (OUTPATIENT)
Dept: BARIATRICS/WEIGHT MGMT | Facility: CLINIC | Age: 47
End: 2022-11-03

## 2022-11-03 VITALS
SYSTOLIC BLOOD PRESSURE: 122 MMHG | HEART RATE: 98 BPM | WEIGHT: 203.5 LBS | OXYGEN SATURATION: 100 % | DIASTOLIC BLOOD PRESSURE: 86 MMHG | BODY MASS INDEX: 33.91 KG/M2 | HEIGHT: 65 IN

## 2022-11-03 DIAGNOSIS — E66.09 CLASS 1 OBESITY DUE TO EXCESS CALORIES WITH SERIOUS COMORBIDITY AND BODY MASS INDEX (BMI) OF 34.0 TO 34.9 IN ADULT: Primary | ICD-10-CM

## 2022-11-03 DIAGNOSIS — R63.1 POLYDIPSIA: ICD-10-CM

## 2022-11-03 DIAGNOSIS — E11.9 TYPE 2 DIABETES MELLITUS WITHOUT COMPLICATION, WITHOUT LONG-TERM CURRENT USE OF INSULIN: ICD-10-CM

## 2022-11-03 PROCEDURE — 96372 THER/PROPH/DIAG INJ SC/IM: CPT | Performed by: NURSE PRACTITIONER

## 2022-11-03 PROCEDURE — 99214 OFFICE O/P EST MOD 30 MIN: CPT | Performed by: NURSE PRACTITIONER

## 2022-11-03 RX ORDER — SEMAGLUTIDE 2.4 MG/.75ML
2.4 INJECTION, SOLUTION SUBCUTANEOUS WEEKLY
Qty: 9 ML | Refills: 0 | Status: SHIPPED | OUTPATIENT
Start: 2022-11-03 | End: 2023-02-06 | Stop reason: SDUPTHER

## 2022-11-03 RX ORDER — CYANOCOBALAMIN 1000 UG/ML
1000 INJECTION, SOLUTION INTRAMUSCULAR; SUBCUTANEOUS
Status: SHIPPED | OUTPATIENT
Start: 2022-11-03

## 2022-11-03 RX ADMIN — CYANOCOBALAMIN 1000 MCG: 1000 INJECTION, SOLUTION INTRAMUSCULAR; SUBCUTANEOUS at 09:15

## 2022-11-03 NOTE — PROGRESS NOTES
[facility]  [address]  [place] [address]     Office Note      Date: 2022  Patient Name: Eloise Delatorre  MRN: [id number]  : 1975  Subjective  Subjective     Chief Complaint  Obesity Management follow-up          Eloise Delatorre presents to [facility]NAGEMENT for obesity management and weight regain following MBS. s/p LSG/liver bx (steatohepatitis) 20 w/ Dr. Guzman. Presurgery weight: 281 pounds (~250lb per patient). Treatment goal 180lb. Patient is satisfied with weight loss progress. Appetite is moderately controlled, still gets full fast but struggles with stress eating. Doesn't even like sweets but finds herself eating excessive amounts when she is stressed (ex. Several slices of pumpkin pie). Reports no side effects of prescribed medications today. The patient is taking multivitamin and is taking fish oil.  The patient is not using a food journal. Drinks a lot of water. Not checking blood sugar. No symptoms with diabetes other than excessive thirst especially if she has salt.   The patient is exercising with a FITT score of:    Frequency Intensity Time Strength Training   [x]   0, none [x]   0 [x]   0 [x]   0   []   1 (1-2x/week) []   1 (light) []   1 (<10 min) []   1 (1x/week)   []   2 (3-5x/week) []   2 (moderate) []   2 (10-20 min) []   2 (2x/week)   []   3 (daily) []   3 (moderately hard)  []   4 (very hard) []   3 (20-30 min)  []   4 (>30 min) []   3 (3-4x/week)     Review of Systems   Constitutional: Negative for appetite change and fatigue.   Eyes: Negative for blurred vision, double vision and visual disturbance.   Cardiovascular: Negative for chest pain, palpitations and leg swelling.   Gastrointestinal: Positive for GERD (spicy foods). Negative for abdominal pain, constipation, diarrhea, nausea and vomiting.   Endocrine: Positive for heat intolerance (hot flashes with menopause) and polydipsia. Negative  "for polyphagia and polyuria.   Musculoskeletal: Positive for back pain. Negative for arthralgias and myalgias.   Neurological: Negative for dizziness, tremors, light-headedness, headache and memory problem.        Parasthesias negative   Psychiatric/Behavioral: Positive for stress. Negative for sleep disturbance and depressed mood. The patient is nervous/anxious.        Objective   Start weight: 227 pounds.    Total Loss lb/%Loss of beginning body weight (BBW): -23.5lb/-10.35%  Change in weight since last visit: -3.5    Body mass index is 34.39 kg/m².   Body composition analysis completed and showed:   %body fat: 45.9  Measurements (in inches)  Waist: 48    Vital Signs:   /86   Pulse 98   Ht 163.8 cm (64.5\")   Wt 92.3 kg (203 lb 8 oz)   SpO2 100%   BMI 34.39 kg/m²     Physical Exam   General appears stated age and normal appearance   HEENT PERRLA, EOM intact and conjunctivae normal   Chest/lungs Normal rate, Regular rhythm and Breathing is unlabored   Extremities without edema   Neuro Good historian and No focal deficit   Skin Warm, dry, intact   Psych normal behavior, normal thought content and normal concentration     Result Review :                Assessment / Plan        Diagnoses and all orders for this visit:    1. Class 1 obesity due to excess calories with serious comorbidity and body mass index (BMI) of 34.0 to 34.9 in adult (Primary)  Assessment & Plan:  Patient's (Body mass index is 34.39 kg/m².) indicates that they are obese (BMI >30) with health conditions that include obstructive sleep apnea, hypertension, diabetes mellitus, dyslipidemias, GERD and chronic back pain, anxiety . Weight is improving with treatment. BMI is is above average; BMI management plan is completed. We discussed low calorie, low carb based diet program, portion control, increasing exercise, management of depression/anxiety/stress to control compensatory eating, pharmacologic options including wegovy and an sally-based " approach such as [employer]itness Pal or Lose It.    I have instructed the patient to continue with pursuit of medical weight loss as a part of this program. Patient does meet criteria for use of anorectics at this time as is not at treatment goal and BMI >30.     Continue nutritional focus and work towards new exercise FITT goal of: 2-2-4-2.  The current plan for this month includes: continue to work on lifestyle behavioral changes. Discussed strategies to help with stress eating- have list of things to do to manage anxiety signals that are not food related- physical activity, hobby (wants to do indoor gardening in her basement), journaling, etc. Continue wegovy at 2.4mg weekly. Treatment goal 180lb. Follow up 3 months or sooner for weight increase.       Orders:  -     Comprehensive Metabolic Panel  -     Semaglutide-Weight Management (Wegovy) 2.4 MG/0.75ML solution auto-injector; Inject 2.4 mg under the skin into the appropriate area as directed 1 (One) Time Per Week.  Dispense: 9 mL; Refill: 0    2. Type 2 diabetes mellitus without complication, without long-term current use of insulin ([facility])  Assessment & Plan:  Diabetes is stable.   Continue current treatment regimen.  Regular aerobic exercise.  Diabetes will be reassessed today with A1c.  Continue wegovy.     Orders:  -     Hemoglobin A1c  -     cyanocobalamin injection 1,000 mcg    3. Polydipsia  -     Hemoglobin A1c  -     Comprehensive Metabolic Panel      I spent 30 minutes caring for Eloise on this date of service. This time includes time spent by me in the following activities:preparing for the visit, reviewing tests, obtaining and/or reviewing a separately obtained history, performing a medically appropriate examination and/or evaluation , counseling and educating the patient/family/caregiver, ordering medications, tests, or procedures and documenting information in the medical record  Follow Up   Return in about 3 months (around 2/3/2023) for Next scheduled  follow up, Labs this visit.  Patient was given instructions and counseling regarding her condition or for health maintenance advice. Please see specific information pulled into the AVS if appropriate.     Katy Dobson, APRN  11/03/2022

## 2022-11-03 NOTE — ASSESSMENT & PLAN NOTE
Patient's (Body mass index is 34.39 kg/m².) indicates that they are obese (BMI >30) with health conditions that include obstructive sleep apnea, hypertension, diabetes mellitus, dyslipidemias, GERD and chronic back pain, anxiety . Weight is improving with treatment. BMI is is above average; BMI management plan is completed. We discussed low calorie, low carb based diet program, portion control, increasing exercise, management of depression/anxiety/stress to control compensatory eating, pharmacologic options including wegovy and an sally-based approach such as [employer] Pal or Lose It.    I have instructed the patient to continue with pursuit of medical weight loss as a part of this program. Patient does meet criteria for use of anorectics at this time as is not at treatment goal and BMI >30.     Continue nutritional focus and work towards new exercise FITT goal of: 2-2-4-2.  The current plan for this month includes: continue to work on lifestyle behavioral changes. Discussed strategies to help with stress eating- have list of things to do to manage anxiety signals that are not food related- physical activity, hobby (wants to do indoor gardening in her basement), journaling, etc. Continue wegovy at 2.4mg weekly. Treatment goal 180lb. Follow up 3 months or sooner for weight increase.

## 2022-11-03 NOTE — ASSESSMENT & PLAN NOTE
Diabetes is stable.   Continue current treatment regimen.  Regular aerobic exercise.  Diabetes will be reassessed today with A1c.  Continue wegovy.

## 2022-11-04 LAB
ALBUMIN SERPL-MCNC: 4.2 G/DL (ref 3.5–5.2)
ALBUMIN/GLOB SERPL: 2.1 G/DL
ALP SERPL-CCNC: 77 U/L (ref 39–117)
ALT SERPL-CCNC: 15 U/L (ref 1–33)
AST SERPL-CCNC: 14 U/L (ref 1–32)
BILIRUB SERPL-MCNC: <0.2 MG/DL (ref 0–1.2)
BUN SERPL-MCNC: 18 MG/DL (ref 6–20)
BUN/CREAT SERPL: 23.7 (ref 7–25)
CALCIUM SERPL-MCNC: 9.5 MG/DL (ref 8.6–10.5)
CHLORIDE SERPL-SCNC: 107 MMOL/L (ref 98–107)
CO2 SERPL-SCNC: 27.6 MMOL/L (ref 22–29)
CREAT SERPL-MCNC: 0.76 MG/DL (ref 0.57–1)
EGFRCR SERPLBLD CKD-EPI 2021: 96.8 ML/MIN/1.73
GLOBULIN SER CALC-MCNC: 2 GM/DL
GLUCOSE SERPL-MCNC: 61 MG/DL (ref 65–99)
HBA1C MFR BLD: 5.9 % (ref 4.8–5.6)
POTASSIUM SERPL-SCNC: 4.6 MMOL/L (ref 3.5–5.2)
PROT SERPL-MCNC: 6.2 G/DL (ref 6–8.5)
SODIUM SERPL-SCNC: 144 MMOL/L (ref 136–145)

## 2023-01-04 DIAGNOSIS — E11.9 TYPE 2 DIABETES MELLITUS WITHOUT COMPLICATION, WITHOUT LONG-TERM CURRENT USE OF INSULIN: ICD-10-CM

## 2023-01-04 NOTE — TELEPHONE ENCOUNTER
Rx Refill Note  Requested Prescriptions     Pending Prescriptions Disp Refills   • metFORMIN ER (GLUCOPHAGE-XR) 500 MG 24 hr tablet [Pharmacy Med Name: METFORMIN HCL  MG TABLET] 270 tablet 0     Sig: TAKE 3 TABLETS BY MOUTH DAILY WITH FOOD.      Last office visit with prescribing clinician: 11/3/2022   Last telemedicine visit with prescribing clinician: 2/6/2023   Next office visit with prescribing clinician: 2/6/2023                         Would you like a call back once the refill request has been completed: [] Yes [] No    If the office needs to give you a call back, can they leave a voicemail: [] Yes [] No    Praveena Mcintyre MA  01/04/23, 15:27 EST

## 2023-01-05 RX ORDER — METFORMIN HYDROCHLORIDE 500 MG/1
TABLET, EXTENDED RELEASE ORAL
Qty: 270 TABLET | Refills: 0 | Status: SHIPPED | OUTPATIENT
Start: 2023-01-05 | End: 2023-03-28

## 2023-02-06 ENCOUNTER — OFFICE VISIT (OUTPATIENT)
Dept: BARIATRICS/WEIGHT MGMT | Facility: CLINIC | Age: 48
End: 2023-02-06
Payer: COMMERCIAL

## 2023-02-06 ENCOUNTER — PATIENT MESSAGE (OUTPATIENT)
Dept: BARIATRICS/WEIGHT MGMT | Facility: CLINIC | Age: 48
End: 2023-02-06

## 2023-02-06 ENCOUNTER — PRIOR AUTHORIZATION (OUTPATIENT)
Dept: BARIATRICS/WEIGHT MGMT | Facility: CLINIC | Age: 48
End: 2023-02-06
Payer: COMMERCIAL

## 2023-02-06 VITALS
HEIGHT: 65 IN | OXYGEN SATURATION: 98 % | SYSTOLIC BLOOD PRESSURE: 122 MMHG | WEIGHT: 198.2 LBS | BODY MASS INDEX: 33.02 KG/M2 | DIASTOLIC BLOOD PRESSURE: 80 MMHG | HEART RATE: 93 BPM

## 2023-02-06 DIAGNOSIS — E11.9 TYPE 2 DIABETES MELLITUS WITHOUT COMPLICATION, WITHOUT LONG-TERM CURRENT USE OF INSULIN: ICD-10-CM

## 2023-02-06 DIAGNOSIS — E66.09 CLASS 1 OBESITY DUE TO EXCESS CALORIES WITH SERIOUS COMORBIDITY AND BODY MASS INDEX (BMI) OF 33.0 TO 33.9 IN ADULT: Primary | ICD-10-CM

## 2023-02-06 DIAGNOSIS — E66.09 CLASS 1 OBESITY DUE TO EXCESS CALORIES WITH SERIOUS COMORBIDITY AND BODY MASS INDEX (BMI) OF 33.0 TO 33.9 IN ADULT: ICD-10-CM

## 2023-02-06 PROBLEM — E66.01 CLASS 2 SEVERE OBESITY DUE TO EXCESS CALORIES WITH SERIOUS COMORBIDITY AND BODY MASS INDEX (BMI) OF 36.0 TO 36.9 IN ADULT: Status: RESOLVED | Noted: 2020-02-04 | Resolved: 2023-02-06

## 2023-02-06 PROBLEM — E66.812 CLASS 2 SEVERE OBESITY DUE TO EXCESS CALORIES WITH SERIOUS COMORBIDITY AND BODY MASS INDEX (BMI) OF 36.0 TO 36.9 IN ADULT: Status: RESOLVED | Noted: 2020-02-04 | Resolved: 2023-02-06

## 2023-02-06 PROCEDURE — 99213 OFFICE O/P EST LOW 20 MIN: CPT | Performed by: NURSE PRACTITIONER

## 2023-02-06 PROCEDURE — 96372 THER/PROPH/DIAG INJ SC/IM: CPT | Performed by: NURSE PRACTITIONER

## 2023-02-06 RX ORDER — SEMAGLUTIDE 2.4 MG/.75ML
2.4 INJECTION, SOLUTION SUBCUTANEOUS WEEKLY
Qty: 9 ML | Refills: 0 | Status: SHIPPED | OUTPATIENT
Start: 2023-02-06 | End: 2023-02-28 | Stop reason: SDUPTHER

## 2023-02-06 RX ADMIN — CYANOCOBALAMIN 1000 MCG: 1000 INJECTION, SOLUTION INTRAMUSCULAR; SUBCUTANEOUS at 08:42

## 2023-02-06 NOTE — ASSESSMENT & PLAN NOTE
Patient's (Body mass index is 33.5 kg/m².) indicates that they are obese (BMI >30) with health conditions that include hypertension, diabetes mellitus, dyslipidemias, GERD and anxiety . Weight is improving with treatment. BMI  is above average; BMI management plan is completed. We discussed low calorie, low carb based diet program, portion control, increasing exercise, joining a fitness center or start home based exercise program, management of depression/anxiety/stress to control compensatory eating, pharmacologic options including wegovy and metformin and an sally-based approach such as Iotelligent Pal or Lose It.    I have instructed the patient to continue with pursuit of medical weight loss as a part of this program. Patient does meet criteria for use of anorectics at this time as BMI >30  and is not at treatment goal.     Continue nutritional focus and work towards new exercise FITT goal of: 2-2-4-2. Increase walking by small increments, tolerating better since weight loss but still has back pain with too much physical activity.   The current plan for this month includes: adjust exercise as discussed, weight loss goal 4-6lbs by next visit and continue to work on lifestyle behavioral changes. Continue mindfulness and behavioral strategies to prevent stress eating. Treatment goal 180lb (patient goal 150lb).

## 2023-02-06 NOTE — ASSESSMENT & PLAN NOTE
Diabetes is improving with treatment. A1c now normal at 5.5. Continue wegovy and metformin.   Continue current treatment regimen.  Regular aerobic exercise.  Diabetes will be reassessed in 3 months-6 months.

## 2023-02-06 NOTE — PROGRESS NOTES
[facility]  [address]  [place] [address]     Office Note      Date: 2023  Patient Name: Eloise Delatorre  MRN: [id number]  : 1975  Subjective  Subjective     Chief Complaint  Obesity Management follow-up          Eloise Delatorre presents to [facility] for obesity management and weight regain following MBS. s/p LSG/liver bx (steatohepatitis) 20 w/ Dr. Guzman. Presurgery weight: 281 pounds (~250lb per patient). Treatment goal 180lb. Patient is unsatisfied with weight loss progress, weight has been stalled since luis enrique. Appetite is moderately controlled, having more cravings. Will eat a piece of hard candy or a chocolate kiss occasionally. Is really stressed right now with her daughter being away at college and her aging parents. Reports no side effects of prescribed medications today. The patient is taking multivitamin and is taking fish oil.  The patient is not using a food journal but plans to restart noom. The patient is not exercising but she has walked some and is tolerating it better since she's lost weight. Still gets low back pain. We reviewed labs she had at work for biometric screening. Her  is supportive and plans on starting this weight loss program with her.     Review of Systems   Constitutional: Negative for appetite change and fatigue.   Eyes: Negative for blurred vision, double vision and visual disturbance.   Cardiovascular: Negative for chest pain and palpitations.   Gastrointestinal: Negative for abdominal pain, constipation, diarrhea, nausea, vomiting and GERD.   Endocrine: Negative for polydipsia, polyphagia and polyuria.   Musculoskeletal: Negative for arthralgias, back pain and myalgias.   Neurological: Negative for dizziness, tremors, light-headedness, headache and memory problem.        Parasthesias negative   Psychiatric/Behavioral: Negative for sleep disturbance, depressed  "mood and stress. The patient is not nervous/anxious.        Objective   Start weight: 227 pounds.    Total Loss lb/%Loss of beginning body weight (BBW): -28.8lb/-12.69%  Change in weight since last visit: -5.6lb    Body mass index is 33.5 kg/m².   Body composition analysis completed and showed:   %body fat: 43.8  Measurements (in inches)  Waist: 47    Vital Signs:   /80   Pulse 93   Ht 163.8 cm (64.5\")   Wt 89.9 kg (198 lb 3.2 oz)   SpO2 98%   BMI 33.50 kg/m²     Physical Exam   General appears stated age and normal appearance   HEENT PERRLA, EOM intact and conjunctivae normal   Chest/lungs Normal rate, Regular rhythm and Breathing is unlabored   Extremities without edema   Neuro Good historian and No focal deficit   Skin Warm, dry, intact   Psych normal behavior, normal thought content and normal concentration     Result Review :   The following data was reviewed by: GIANA Auguste on 02/06/2023:  Common labs    Common Labs 11/3/22 11/3/22 1/24/23 1/24/23 1/24/23 1/24/23 1/30/23    0909 0909 0744 [id number] [id number] 0744    Glucose  61 (A)        BUN  18        Creatinine  0.76        Sodium  144        Potassium  4.6        Chloride  107        Calcium  9.5        Total Protein  6.2        Albumin  4.20  4.1      Total Bilirubin  <0.2  0.2      Alkaline Phosphatase  77  72      AST (SGOT)  14  17      ALT (SGPT)  15  16      WBC     8.6     Hemoglobin     12.0     Hematocrit     38.4     Platelets     248     Total Cholesterol   124       Triglycerides   111       HDL Cholesterol   48       LDL Cholesterol    56       Hemoglobin A1C 5.90 (A)     5.4    Microalbumin, Urine       10   (A) Abnormal value       Comments are available for some flowsheets but are not being displayed.                    Assessment / Plan        Diagnoses and all orders for this visit:    1. Class 1 obesity due to excess calories with serious comorbidity and body mass index (BMI) of 33.0 to 33.9 in adult (Primary)  Assessment & " Plan:  Patient's (Body mass index is 33.5 kg/m².) indicates that they are obese (BMI >30) with health conditions that include hypertension, diabetes mellitus, dyslipidemias, GERD and anxiety . Weight is improving with treatment. BMI  is above average; BMI management plan is completed. We discussed low calorie, low carb based diet program, portion control, increasing exercise, joining a fitness center or start home based exercise program, management of depression/anxiety/stress to control compensatory eating, pharmacologic options including wegovy and metformin and an sally-based approach such as Hemosphere Pal or Lose It.    I have instructed the patient to continue with pursuit of medical weight loss as a part of this program. Patient does meet criteria for use of anorectics at this time as BMI >30  and is not at treatment goal.     Continue nutritional focus and work towards new exercise FITT goal of: 2-2-4-2. Increase walking by small increments, tolerating better since weight loss but still has back pain with too much physical activity.   The current plan for this month includes: adjust exercise as discussed, weight loss goal 4-6lbs by next visit and continue to work on lifestyle behavioral changes. Continue mindfulness and behavioral strategies to prevent stress eating. Treatment goal 180lb (patient goal 150lb).        Orders:  -     Semaglutide-Weight Management (Wegovy) 2.4 MG/0.75ML solution auto-injector; Inject 2.4 mg under the skin into the appropriate area as directed 1 (One) Time Per Week.  Dispense: 9 mL; Refill: 0    2. Type 2 diabetes mellitus without complication, without long-term current use of insulin ([facility])  Assessment & Plan:  Diabetes is improving with treatment. A1c now normal at 5.5. Continue wegovy and metformin.   Continue current treatment regimen.  Regular aerobic exercise.  Diabetes will be reassessed in 3 months-6 months.          Follow Up   Return in about 3 months (around 5/6/2023) for  Next scheduled follow up.  Patient was given instructions and counseling regarding her condition or for health maintenance advice. Please see specific information pulled into the AVS if appropriate.     Katy Dobson, GIANA  02/06/2023

## 2023-02-06 NOTE — TELEPHONE ENCOUNTER
PREM BECERRIL (Key: [id number])  Rx #: [id number]  Wegovy 2.4MG/0.75ML auto-injectors    Denied.      PREM BECERRIL (Key: [id number])  Wegovy 2.4MG/0.75ML auto-injectors    Created an appeal on 2/14/23.

## 2023-02-07 NOTE — TELEPHONE ENCOUNTER
CONSIDER XYZAL 5 MG AT BEDTIME FOR YOUR ALLERGIES.     CONSIDER FLUTICASONE NASAL SPRAY AS WELL FOR THE ALLERGIES.     2 PUFFS ONCE OR TWICE PER DAY AND AIM AWAY FROM THE MIDLINE UNLESS YOU USE THE ONE WITH THE MIST SPRAY.     PUSH MORE WATER AND LESS MILK.     PUSH LOTS OF WATER AND PERFORM AUTOINSUFFLATION EVERY HOUR WHILE AWAKE UNTIL ABLE TO CONSISTENTLY CLEAR THE EARS.        From: Eloise Delatorre  To: Katy Dobson  Sent: 2/6/2023 3:31 PM EST  Subject: Prescriptions must be 90 day    • 30-day refills are no longer covered  • To avoid paying full cost, change to 90-day refills  I will need this for all prescriptions. Including wegovy

## 2023-02-28 ENCOUNTER — PRIOR AUTHORIZATION (OUTPATIENT)
Dept: BARIATRICS/WEIGHT MGMT | Facility: CLINIC | Age: 48
End: 2023-02-28
Payer: COMMERCIAL

## 2023-02-28 RX ORDER — SEMAGLUTIDE 2.4 MG/.75ML
2.4 INJECTION, SOLUTION SUBCUTANEOUS WEEKLY
Qty: 9 ML | Refills: 0 | Status: SHIPPED | OUTPATIENT
Start: 2023-02-28

## 2023-02-28 NOTE — TELEPHONE ENCOUNTER
PREM BECERRIL (Key: [id number])  Rx #: [id number]  Wegovy 2.4MG/0.75ML auto-injectors    APPROVED 03/09/2023 to 03/09/2024.

## 2023-03-27 DIAGNOSIS — E11.9 TYPE 2 DIABETES MELLITUS WITHOUT COMPLICATION, WITHOUT LONG-TERM CURRENT USE OF INSULIN: ICD-10-CM

## 2023-03-28 ENCOUNTER — OFFICE VISIT (OUTPATIENT)
Dept: BARIATRICS/WEIGHT MGMT | Facility: CLINIC | Age: 48
End: 2023-03-28
Payer: MEDICARE

## 2023-03-28 VITALS
HEIGHT: 64 IN | DIASTOLIC BLOOD PRESSURE: 74 MMHG | SYSTOLIC BLOOD PRESSURE: 108 MMHG | OXYGEN SATURATION: 98 % | TEMPERATURE: 97.7 F | HEART RATE: 88 BPM | WEIGHT: 203.01 LBS | BODY MASS INDEX: 34.66 KG/M2

## 2023-03-28 DIAGNOSIS — E66.9 OBESITY, CLASS I, BMI 30-34.9: Primary | ICD-10-CM

## 2023-03-28 PROCEDURE — 3078F DIAST BP <80 MM HG: CPT | Performed by: PHYSICIAN ASSISTANT

## 2023-03-28 PROCEDURE — 1159F MED LIST DOCD IN RCRD: CPT | Performed by: PHYSICIAN ASSISTANT

## 2023-03-28 PROCEDURE — 3074F SYST BP LT 130 MM HG: CPT | Performed by: PHYSICIAN ASSISTANT

## 2023-03-28 PROCEDURE — 1160F RVW MEDS BY RX/DR IN RCRD: CPT | Performed by: PHYSICIAN ASSISTANT

## 2023-03-28 PROCEDURE — 99213 OFFICE O/P EST LOW 20 MIN: CPT | Performed by: PHYSICIAN ASSISTANT

## 2023-03-28 RX ORDER — OMEPRAZOLE 40 MG/1
1 CAPSULE, DELAYED RELEASE ORAL
COMMUNITY
Start: 2023-03-22 | End: 2023-03-28

## 2023-03-28 RX ORDER — METFORMIN HYDROCHLORIDE 500 MG/1
TABLET, EXTENDED RELEASE ORAL
Qty: 270 TABLET | Refills: 0 | Status: SHIPPED | OUTPATIENT
Start: 2023-03-28

## 2023-03-28 NOTE — PROGRESS NOTES
"[facility]  [address]  [address]  [facility] [id number] [phone]        Patient Name:  Eloise Delatorre  :  1975      Date of Visit: 2023      Reason for Visit:   3 years postop      HPI: Eloise Delatorre is a 47 y.o. female s/p LSG/liver bx (steatohepatitis) 20 w/ Dr. Guzamn.     Doing well.  Has been following with medical weight loss and pleased with her results.  Admits she does believe she could make some dietary changes that will further increase her weight loss.  No other issues/concerns. Denies dysphagia, reflux, nausea, vomiting, abdominal pain, memory loss and vision changes.  Getting ??g prot/day. Was tracking for awhile- getting about 90g/day. Still eating the same foods as when she was tracking. Eating 6 \"small meals\" per day. Usually about 3-4 hours apart. Still not able to eat much at one time. Drinking 64 fluid oz/day.  Last bariatric labs revealed levels wnl. Taking MVI.  On Omeprazole .  Physical activity: has back pain so not very much. Just joined [employer]. Trying to increase steps.     Presurgery weight: 281 pounds.  Today's weight is 92.1 kg (203 lb 0.2 oz) pounds, today's  Body mass index is 34.85 kg/m²., and weight loss since surgery is 78 pounds.      Past Medical History:   Diagnosis Date   • Anemia    • Anemia    • Anxiety     no meds, previously on Zoloft, avoids d/t weight gain   • Arthritis    • Asthma     allergy induced, prn inhalers   • Chronic back pain     Baclofen + Ibuprofen, denies steroid injections d/t weight gain   • Chronic pain disorder     back pain   • Class 2 severe obesity due to excess calories with serious comorbidity and body mass index (BMI) of 36.0 to 36.9 in adult ([facility]) 2020    Added automatically from request for surgery [id number]   • Depression    • Dyspepsia     w/ postprandial nausea + abd.pain   • Dyspnea on exertion    • Fatigue    • Fatty liver    • " Fibromyalgia    • Frequent UTI    • GERD (gastroesophageal reflux disease)     BID PPI, denies prior eval   • Heart burn    • Hyperlipidemia    • Hypertension    • Hypertriglyceridemia    • Iron deficiency     in the past   • Morbid obesity (HCC)    • Myocardial infarction (HCC)    • Peptic ulceration    • Psoriasis    • Sleep apnea     PATIENT HAS NEVER HAD A SLEEP STUDY   • Type 2 diabetes mellitus (HCC)     never on insulin, does not check FSBS, A1c 5.9 10/2021   • Vitamin D deficiency    • Wears glasses      Past Surgical History:   Procedure Laterality Date   • BACK SURGERY  2012    L4-L5   • BLADDER SUSPENSION  2009    during hysterectomy   • BREAST BIOPSY      Left   • BREAST CYST ASPIRATION     • BREAST CYST ASPIRATION     • BREAST CYST ASPIRATION     • BREAST CYST ASPIRATION     • BREAST SURGERY     • COLONOSCOPY  2015    unremarkable, but w/ hx polyps   • DIAGNOSTIC LAPAROSCOPY  1994    x 8 ([id number]) for endometriosis   • ENDOSCOPY     • ENDOSCOPY N/A 02/25/2020    Procedure: ESOPHAGOGASTRODUODENOSCOPY;  Surgeon: Lila Guzman MD;  Location:  [facility];  Service: Bariatric;  Laterality: N/A;   • GASTRIC SLEEVE LAPAROSCOPIC N/A 02/25/2020    Procedure: GASTRIC SLEEVE LAPAROSCOPIC;  Surgeon: Lila Guzman MD;  Location:  [facility];  Service: Bariatric;  Laterality: N/A;   • HEMORRHOIDECTOMY     • LIVER BIOPSY N/A 02/25/2020    Procedure: LIVER BIOPSY LAPAROSCOPIC;  Surgeon: Lila Guzman MD;  Location:  [facility];  Service: Bariatric;  Laterality: N/A;   • TOTAL LAPAROSCOPIC HYSTERECTOMY  2009    for endometriosis   • WISDOM TOOTH EXTRACTION  1992     Outpatient Medications Marked as Taking for the 3/28/23 encounter (Office Visit) with Leilani Jay PA   Medication Sig Dispense Refill   • atorvastatin (LIPITOR) 20 MG tablet 1 tablet.     • azelastine (ASTELIN) 0.1 % nasal spray 2 sprays into the nostril(s) as directed by provider.     • docusate sodium (COLACE) 100 MG capsule Take  1 capsule by mouth 4 (Four) Times a Day.     • fenofibric acid (TRILIPIX) 135 MG capsule delayed-release delayed release capsule Take 1 capsule by mouth Daily.     • lactobacillus acidophilus (RISAQUAD) capsule capsule Take  by mouth.     • lisinopril (PRINIVIL,ZESTRIL) 10 MG tablet Take 1 tablet by mouth Daily.     • Loratadine-Pseudoephedrine (CLARITIN-D 12 HOUR PO) Take 1 tablet by mouth Daily.     • metFORMIN ER (GLUCOPHAGE-XR) 500 MG 24 hr tablet TAKE 3 TABLETS BY MOUTH DAILY WITH FOOD. 270 tablet 0   • Multiple Vitamins-Minerals (CENTRUM ULTRA WOMENS) tablet Take 1 tablet by mouth Daily. patch     • omeprazole (priLOSEC) 40 MG capsule Take 1 capsule by mouth Daily. 90 capsule 0   • Semaglutide-Weight Management (Wegovy) 2.4 MG/0.75ML solution auto-injector Inject 2.4 mg under the skin into the appropriate area as directed 1 (One) Time Per Week. 9 mL 0   • [DISCONTINUED] omeprazole (priLOSEC) 40 MG capsule Take 1 capsule by mouth Every Morning Before Breakfast.       Current Facility-Administered Medications for the 3/28/23 encounter (Office Visit) with Leilani Jay PA   Medication Dose Route Frequency Provider Last Rate Last Admin   • cyanocobalamin injection 1,000 mcg  1,000 mcg Intramuscular Q28 Days Katy Dobson APRN   1,000 mcg at 23 0842       No Known Allergies    Social History     Socioeconomic History   • Marital status:    Tobacco Use   • Smoking status: Former     Packs/day: 3.00     Years: 20.00     Pack years: 60.00     Types: Cigarettes     Quit date: 2009     Years since quittin.2   • Smokeless tobacco: Never   Substance and Sexual Activity   • Alcohol use: No   • Drug use: Never   • Sexual activity: Not Currently     Partners: Male     Birth control/protection: Abstinence, None     Comment: Hysterectomy     Social History     Social History Narrative    Living in [place], [place] w/  and daughter.  On disability since 2019 for back pain.  Formerly worked  "w/ [employer] x 17 years.       /74   Pulse 88   Temp 97.7 °F (36.5 °C)   Ht 162.6 cm (64\")   Wt 92.1 kg (203 lb 0.2 oz)   SpO2 98%   BMI 34.85 kg/m²     Physical Exam  Constitutional:       Appearance: She is obese.   HENT:      Head: Normocephalic and atraumatic.   Cardiovascular:      Rate and Rhythm: Normal rate and regular rhythm.   Pulmonary:      Effort: Pulmonary effort is normal.      Breath sounds: Normal breath sounds.   Abdominal:      General: Bowel sounds are normal. There is no distension.      Palpations: Abdomen is soft. There is no mass.      Tenderness: There is no abdominal tenderness.   Skin:     General: Skin is warm and dry.   Neurological:      General: No focal deficit present.      Mental Status: She is alert and oriented to person, place, and time.   Psychiatric:         Mood and Affect: Mood normal.         Behavior: Behavior normal.           Assessment:  3 years s/p LSG/liver bx (steatohepatitis) 2/25/20 w/ Dr. Guzman.     ICD-10-CM ICD-9-CM   1. Obesity, Class I, BMI 30-34.9  E66.9 278.00       BMI is >= 30 and <35. (Class 1 Obesity). The following options were offered after discussion;: exercise counseling/recommendations and nutrition counseling/recommendations      Plan:  Doing well. Continue w/ good food choices and healthy habits.  Continue protein >70g/day.  Discussed starting to track intake again to ensure she is getting adequate protein and calories.  Increase routine physical activity.  Deferred bariatric labs-drawn in January 2023.  Continue MVI. Call w/ problems/concerns.     The patient was instructed to follow up in one year, sooner if needed.           "

## 2023-05-08 ENCOUNTER — TELEMEDICINE (OUTPATIENT)
Dept: BARIATRICS/WEIGHT MGMT | Facility: CLINIC | Age: 48
End: 2023-05-08
Payer: COMMERCIAL

## 2023-05-08 VITALS — WEIGHT: 200 LBS | HEIGHT: 64 IN | BODY MASS INDEX: 34.15 KG/M2

## 2023-05-08 DIAGNOSIS — E66.09 CLASS 1 OBESITY DUE TO EXCESS CALORIES WITH SERIOUS COMORBIDITY AND BODY MASS INDEX (BMI) OF 34.0 TO 34.9 IN ADULT: Primary | ICD-10-CM

## 2023-05-08 DIAGNOSIS — Z98.84 STATUS POST BARIATRIC SURGERY: ICD-10-CM

## 2023-05-08 DIAGNOSIS — M54.9 CHRONIC BACK PAIN, UNSPECIFIED BACK LOCATION, UNSPECIFIED BACK PAIN LATERALITY: ICD-10-CM

## 2023-05-08 DIAGNOSIS — E11.9 TYPE 2 DIABETES MELLITUS WITHOUT COMPLICATION, WITHOUT LONG-TERM CURRENT USE OF INSULIN: ICD-10-CM

## 2023-05-08 DIAGNOSIS — G89.29 CHRONIC BACK PAIN, UNSPECIFIED BACK LOCATION, UNSPECIFIED BACK PAIN LATERALITY: ICD-10-CM

## 2023-05-08 RX ORDER — ATORVASTATIN CALCIUM 40 MG/1
1 TABLET, FILM COATED ORAL DAILY
COMMUNITY
Start: 2023-04-02

## 2023-05-08 RX ORDER — METFORMIN HYDROCHLORIDE 500 MG/1
TABLET, EXTENDED RELEASE ORAL
Qty: 270 TABLET | Refills: 0
Start: 2023-05-08

## 2023-05-08 RX ORDER — SEMAGLUTIDE 2.4 MG/.75ML
2.4 INJECTION, SOLUTION SUBCUTANEOUS WEEKLY
Qty: 9 ML | Refills: 0 | Status: SHIPPED | OUTPATIENT
Start: 2023-05-08

## 2023-05-08 NOTE — ASSESSMENT & PLAN NOTE
Patient's (Body mass index is 34.33 kg/m².) indicates that they are obese (BMI >30) with health conditions that include obstructive sleep apnea, diabetes mellitus, dyslipidemias, GERD and fatty liver . Weight is improving with treatment. BMI  is above average; BMI management plan is completed. We discussed low calorie, low carb based diet program, portion control, increasing exercise, joining a fitness center or start home based exercise program, pharmacologic options including wegovy, metformin and an sally-based approach such as [employer] Solar Pal or Lose It.    I have instructed the patient to continue with pursuit of medical weight loss as a part of this program. Patient does meet criteria for use of anorectics at this time as BMI >30  and is not at treatment goal.     Continue nutritional focus and work towards new exercise FITT goal of: 2-2-4-2.  The current plan for this month includes: continue to work on lifestyle behavioral changes and continue nutrition focus. Refer for exercise education, lower back pain is very limiting.   Patient's weight has decreased by a total of 28% since LSG in 2020, 13.5% with medical weight management. We discussed that this is clinically significant, and it is also excellent that she is preventing weight regain with her healthy lifestyle choices and medications like wegovy. She would still like to lose 50 more pounds. It is reasonable to pursue further weight reduction considering her hyperlipidemia and type 2 diabetes, understanding that whatever intervention is used will most likely need to be continued long term. Discussed options of: increase metformin to 2000mg/day, add bupropion, add bupropion/naltrexone, add phentermine, add back topamax (felt drowsy and food tasted weird), add supplements like ALA or chromate, or add bariatric advantage multivitamin. She would like to try increasing metformin today.

## 2023-05-08 NOTE — PROGRESS NOTES
Office Note      Date: 2023  Patient Name: Eloise Delatorre  MRN: [id number]  : 1975    This service was conducted via [employer].  Patient is located at her home address.  Provider is located at her work address. The use of a video visit has been reviewed with the patient and informed consent has been obtained.  Subjective  Subjective     Chief Complaint  Obesity Management follow-up    Subjective          Eloise Delatorre presents to [facility] via telehealth for obesity management.     Patient is satisfied with weight loss progress. Appetite is well controlled. Reports no side effects of prescribed medications today. She is using the [employer] sally to help with stress eating.  Typical intake:   B: cottage cheese and grapefruit  L: 2 ham and cheese roll ups and one peanut butter cookie  D: baked chicken thigh and green beans  S: cottage cheese, dill pickles, yogurt, watermelon, protein shakes occasionally  Sometimes tracks calories with myfitnesspal premium. Denies hypoglycemia.   The patient is exercising, has been outside doing more yardwork. Joined [employer], likes the stepper. Has to be careful with impact, can tolerate about 4-5 minutes. Can't really tolerate bike or elliptical. Previously did swimming 6 days a week but it got to expensive and too far away. Using the massage chair.     Review of Systems   Constitutional: Negative for appetite change, diaphoresis and fatigue.   Eyes: Negative for blurred vision.   Respiratory: Negative for chest tightness and shortness of breath.    Cardiovascular: Negative for chest pain and palpitations.   Gastrointestinal: Negative for abdominal pain, constipation, diarrhea, nausea, vomiting, GERD and indigestion.   Endocrine: Negative for polydipsia, polyphagia and polyuria.   Musculoskeletal: Positive for arthralgias and back pain. Negative for gait problem and myalgias.   Skin: Negative for dry skin.   Neurological:  "Negative for dizziness, tremors, syncope, weakness, light-headedness and headache.        Parasthesias negative   Hematological: Does not bruise/bleed easily.   Psychiatric/Behavioral: Negative for depressed mood and stress. The patient is not nervous/anxious.          Objective   Body mass index is 34.33 kg/m².  Start weight MWM: 227 pounds.    Total weight loss: -27 pounds/-13.5%  Change in weight since last visit: -3.5lb    Ht 162.6 cm (64\")   Wt 90.7 kg (200 lb)   BMI 34.33 kg/m²       Result Review :                 Assessment and Plan    Diagnoses and all orders for this visit:    1. Class 1 obesity due to excess calories with serious comorbidity and body mass index (BMI) of 34.0 to 34.9 in adult (Primary)  Assessment & Plan:  Patient's (Body mass index is 34.33 kg/m².) indicates that they are obese (BMI >30) with health conditions that include obstructive sleep apnea, diabetes mellitus, dyslipidemias, GERD and fatty liver . Weight is improving with treatment. BMI  is above average; BMI management plan is completed. We discussed low calorie, low carb based diet program, portion control, increasing exercise, joining a fitness center or start home based exercise program, pharmacologic options including wegovy, metformin and an sally-based approach such as Daily [employer] for [employer] or Lose It.    I have instructed the patient to continue with pursuit of medical weight loss as a part of this program. Patient does meet criteria for use of anorectics at this time as BMI >30  and is not at treatment goal.     Continue nutritional focus and work towards new exercise FITT goal of: 2-2-4-2.  The current plan for this month includes: continue to work on lifestyle behavioral changes and continue nutrition focus. Refer for exercise education, lower back pain is very limiting.   Patient's weight has decreased by a total of 28% since LS in 2020, 13.5% with medical weight management. We discussed that this is clinically significant, and " it is also excellent that she is preventing weight regain with her healthy lifestyle choices and medications like wegovy. She would still like to lose 50 more pounds. It is reasonable to pursue further weight reduction considering her hyperlipidemia and type 2 diabetes, understanding that whatever intervention is used will most likely need to be continued long term. Discussed options of: increase metformin to 2000mg/day, add bupropion, add bupropion/naltrexone, add phentermine, add back topamax (felt drowsy and food tasted weird), add supplements like ALA or chromate, or add bariatric advantage multivitamin. She would like to try increasing metformin today.        Orders:  -     Semaglutide-Weight Management (Wegovy) 2.4 MG/0.75ML solution auto-injector; Inject 2.4 mg under the skin into the appropriate area as directed 1 (One) Time Per Week.  Dispense: 9 mL; Refill: 0  -     Ambulatory Referral to Exercise Education    2. Type 2 diabetes mellitus without complication, without long-term current use of insulin  Assessment & Plan:  Diabetes is improving with treatment.   Continue current treatment regimen.  Regular aerobic exercise.  Diabetes will be reassessed in 3 months.    Orders:  -     metFORMIN ER (GLUCOPHAGE-XR) 500 MG 24 hr tablet; Take two tablets by mouth twice a day.  Dispense: 270 tablet; Refill: 0  -     Ambulatory Referral to Exercise Education    3. Chronic back pain, unspecified back location, unspecified back pain laterality  -     Ambulatory Referral to Exercise Education    4. Status post bariatric surgery  -     Ambulatory Referral to Exercise Education      I spent 35 minutes caring for Eloise on this date of service. This time includes time spent by me in the following activities:preparing for the visit, performing a medically appropriate examination and/or evaluation , counseling and educating the patient/family/caregiver, ordering medications, tests, or procedures, referring and communicating  with other health care professionals  and documenting information in the medical record  Follow Up   Return in about 3 months (around 8/8/2023) for Next scheduled follow up.  Patient was given instructions and counseling regarding her condition or for health maintenance advice. Please see specific information pulled into the AVS if appropriate.     GIANA Bell

## 2023-06-12 DIAGNOSIS — E11.9 TYPE 2 DIABETES MELLITUS WITHOUT COMPLICATION, WITHOUT LONG-TERM CURRENT USE OF INSULIN: ICD-10-CM

## 2023-06-12 NOTE — TELEPHONE ENCOUNTER
Rx Refill Note  Requested Prescriptions     Pending Prescriptions Disp Refills   • metFORMIN ER (GLUCOPHAGE-XR) 500 MG 24 hr tablet [Pharmacy Med Name: METFORMIN HCL  MG TABLET] 270 tablet 0     Sig: TAKE 3 TABLETS BY MOUTH DAILY WITH FOOD.      Last office visit with prescribing clinician: 2/6/2023   Last telemedicine visit with prescribing clinician: 5/8/2023   Next office visit with prescribing clinician: 6/27/2023                         Would you like a call back once the refill request has been completed: [] Yes [] No    If the office needs to give you a call back, can they leave a voicemail: [] Yes [] No    Praveena Mcintyre MA  06/12/23, 08:11 EDT

## 2023-06-19 RX ORDER — METFORMIN HYDROCHLORIDE 500 MG/1
TABLET, EXTENDED RELEASE ORAL
Qty: 270 TABLET | Refills: 0 | Status: SHIPPED | OUTPATIENT
Start: 2023-06-19

## 2023-09-01 DIAGNOSIS — E11.9 TYPE 2 DIABETES MELLITUS WITHOUT COMPLICATION, WITHOUT LONG-TERM CURRENT USE OF INSULIN: ICD-10-CM

## 2023-09-05 RX ORDER — METFORMIN HYDROCHLORIDE 500 MG/1
TABLET, EXTENDED RELEASE ORAL
Qty: 270 TABLET | Refills: 0 | Status: SHIPPED | OUTPATIENT
Start: 2023-09-05

## 2023-09-19 ENCOUNTER — PATIENT MESSAGE (OUTPATIENT)
Dept: BARIATRICS/WEIGHT MGMT | Facility: CLINIC | Age: 48
End: 2023-09-19
Payer: COMMERCIAL

## 2023-09-19 DIAGNOSIS — K21.9 GASTROESOPHAGEAL REFLUX DISEASE, UNSPECIFIED WHETHER ESOPHAGITIS PRESENT: ICD-10-CM

## 2023-09-19 DIAGNOSIS — E11.9 TYPE 2 DIABETES MELLITUS WITHOUT COMPLICATION, WITHOUT LONG-TERM CURRENT USE OF INSULIN: ICD-10-CM

## 2023-09-19 DIAGNOSIS — E66.09 CLASS 1 OBESITY DUE TO EXCESS CALORIES WITH SERIOUS COMORBIDITY AND BODY MASS INDEX (BMI) OF 34.0 TO 34.9 IN ADULT: Primary | ICD-10-CM

## 2023-09-19 DIAGNOSIS — I10 HYPERTENSION, UNSPECIFIED TYPE: ICD-10-CM

## 2023-09-20 NOTE — TELEPHONE ENCOUNTER
From: Eloise Delatorre  To: Katy Dobson  Sent: 9/19/2023 9:56 AM EDT  Subject: Bloodwork    Can u put in the order for bloodwork today so I can have it done for my appt please?

## 2023-09-26 ENCOUNTER — OFFICE VISIT (OUTPATIENT)
Dept: BARIATRICS/WEIGHT MGMT | Facility: CLINIC | Age: 48
End: 2023-09-26
Payer: COMMERCIAL

## 2023-09-26 VITALS
WEIGHT: 198 LBS | HEART RATE: 92 BPM | BODY MASS INDEX: 33.8 KG/M2 | DIASTOLIC BLOOD PRESSURE: 76 MMHG | SYSTOLIC BLOOD PRESSURE: 104 MMHG | RESPIRATION RATE: 16 BRPM | OXYGEN SATURATION: 98 % | HEIGHT: 64 IN

## 2023-09-26 DIAGNOSIS — E11.9 TYPE 2 DIABETES MELLITUS WITHOUT COMPLICATION, WITHOUT LONG-TERM CURRENT USE OF INSULIN: ICD-10-CM

## 2023-09-26 DIAGNOSIS — E66.09 CLASS 1 OBESITY DUE TO EXCESS CALORIES WITH SERIOUS COMORBIDITY AND BODY MASS INDEX (BMI) OF 33.0 TO 33.9 IN ADULT: Primary | ICD-10-CM

## 2023-09-26 RX ORDER — SEMAGLUTIDE 2.4 MG/.75ML
2.4 INJECTION, SOLUTION SUBCUTANEOUS WEEKLY
Qty: 9 ML | Refills: 0 | Status: SHIPPED | OUTPATIENT
Start: 2023-09-26

## 2023-09-26 NOTE — PROGRESS NOTES
[facility]  [address]  [place] [address]     Office Note      Date: 2023  Patient Name: Eloise Delatorre  MRN: [id number]  : 1975  Subjective  Subjective     Chief Complaint  Obesity Management follow-up          Eloise Delatorre presents to [facility] for obesity management.   Patient is satisfied with weight loss progress. Appetite is moderately controlled. Reports constipation with wegovy but it is managed with OTC stool softener. She occasionally feels bloated/gassy, thinks it might be related to vegetables in her diet. The patient is taking multivitamin and is taking fish oil. The patient is using a food journal.  24 hour recall:  B:3 chickfila chicken minis plain, 1 black coffee   L: 1 cup low fat cottage cheese, 1 medium apple, 1 alvin's kiss  D: 1 green bell pepper, 1 large tomato with tajin, 1 cup cherry tomatoes, 1 corn on the cob w/ salt and pepper   The patient is exercising with a FITT score of:     Frequency Intensity Time Strength Training   [x]   0, none []   0 []   0 [x]   0   []   1 (1-2x/week) [x]   1 (light) [x]   1 (<10 min) []   1 (1x/week)   []   2 (3-5x/week) []   2 (moderate) []   2 (10-20 min) []   2 (2x/week)   []   3 (daily) []   3 (moderately hard)  []   4 (very hard) []   3 (20-30 min)  []   4 (>30 min) []   3 (3-4x/week)     Review of Systems   Constitutional:  Negative for appetite change and fatigue.   Eyes:  Negative for blurred vision, double vision and visual disturbance.   Cardiovascular:  Negative for chest pain and palpitations.   Gastrointestinal:  Positive for indigestion. Negative for abdominal pain, constipation, diarrhea, nausea, vomiting and GERD.   Endocrine: Negative for polydipsia, polyphagia and polyuria.   Musculoskeletal:  Negative for arthralgias, back pain and myalgias.   Neurological:  Negative for dizziness, tremors, light-headedness, headache and memory problem.  "       Parasthesias negative   Psychiatric/Behavioral:  Negative for sleep disturbance, depressed mood and stress. The patient is not nervous/anxious.    All other systems reviewed and are negative.    Objective   Start weight: 227 pounds.    Total Loss lb/%Loss of beginning body weight (BBW): -29 lb/-12%  Change in weight since last visit: 0    Body mass index is 33.99 kg/m².   Body composition analysis completed and showed:   Body Fat %: 45.5    Measurements (in inches)  Waist Circumference: 47.5      Vital Signs:   /76 (BP Location: Left arm, Patient Position: Sitting)   Pulse 92   Resp 16   Ht 162.6 cm (64\")   Wt 89.8 kg (198 lb)   SpO2 98%   BMI 33.99 kg/m²     Physical Exam   General appears stated age and normal appearance   HEENT PERRLA, EOM intact, and conjunctivae normal   Chest/lungs Normal rate, Regular rhythm, and Breathing is unlabored   Extremities without edema   Neuro Good historian and No focal deficit   Skin Warm, dry, intact   Psych normal behavior, normal thought content, and normal concentration     Result Review :                Assessment / Plan        Diagnoses and all orders for this visit:    1. Class 1 obesity due to excess calories with serious comorbidity and body mass index (BMI) of 33.0 to 33.9 in adult (Primary)  Assessment & Plan:  Patient's (Body mass index is 33.99 kg/m².) indicates that they are obese (BMI >30) with health conditions that include obstructive sleep apnea, hypertension, diabetes mellitus, dyslipidemias, and GERD . Weight is improving with treatment. BMI  is above average; BMI management plan is completed. We discussed low calorie, low carb based diet program, portion control, increasing exercise, management of depression/anxiety/stress to control compensatory eating, and pharmacologic options including wegovy, metformin .    I have instructed the patient to continue with pursuit of medical weight loss as a part of this program. Patient does meet " criteria for use of anorectics at this time as BMI >30  and this medication is indicated for LONG TERM use for management of obesity.   Side effect of treatment: constipation  The current plan for this month includes:   - Continue current exercise efforts  - Continue nutrition focus  - Continue to prioritize protein, fiber, and hydration.   - Treatment goal 180lb.       Orders:  -     Semaglutide-Weight Management (Wegovy) 2.4 MG/0.75ML solution auto-injector; Inject 2.4 mg under the skin into the appropriate area as directed 1 (One) Time Per Week.  Dispense: 9 mL; Refill: 0    2. Type 2 diabetes mellitus without complication, without long-term current use of insulin  Assessment & Plan:  Diabetes is improving with treatment, denies hypoglycemia.  Continue current treatment regimen.  Regular aerobic exercise.  Diabetes will be reassessed today with A1c then again in 6 months.            Follow Up   Return in about 3 months (around 12/26/2023) for Labs this visit.  Patient was given instructions and counseling regarding her condition or for health maintenance advice. Please see specific information pulled into the AVS if appropriate.     Katy Dobson, APRN  09/26/2023

## 2023-09-27 LAB
ALBUMIN SERPL-MCNC: 4.6 G/DL (ref 3.5–5.2)
ALBUMIN/GLOB SERPL: 2.6 G/DL
ALP SERPL-CCNC: 73 U/L (ref 39–117)
ALT SERPL-CCNC: 19 U/L (ref 1–33)
AST SERPL-CCNC: 16 U/L (ref 1–32)
BASOPHILS # BLD AUTO: 0.05 10*3/MM3 (ref 0–0.2)
BASOPHILS NFR BLD AUTO: 0.6 % (ref 0–1.5)
BILIRUB SERPL-MCNC: 0.3 MG/DL (ref 0–1.2)
BUN SERPL-MCNC: 18 MG/DL (ref 6–20)
BUN/CREAT SERPL: 26.5 (ref 7–25)
CALCIUM SERPL-MCNC: 9.8 MG/DL (ref 8.6–10.5)
CHLORIDE SERPL-SCNC: 107 MMOL/L (ref 98–107)
CO2 SERPL-SCNC: 26.4 MMOL/L (ref 22–29)
CREAT SERPL-MCNC: 0.68 MG/DL (ref 0.57–1)
EGFRCR SERPLBLD CKD-EPI 2021: 106.9 ML/MIN/1.73
EOSINOPHIL # BLD AUTO: 0.16 10*3/MM3 (ref 0–0.4)
EOSINOPHIL NFR BLD AUTO: 1.8 % (ref 0.3–6.2)
ERYTHROCYTE [DISTWIDTH] IN BLOOD BY AUTOMATED COUNT: 15.3 % (ref 12.3–15.4)
GLOBULIN SER CALC-MCNC: 1.8 GM/DL
GLUCOSE SERPL-MCNC: 122 MG/DL (ref 65–99)
HBA1C MFR BLD: 5.8 % (ref 4.8–5.6)
HCT VFR BLD AUTO: 44.5 % (ref 34–46.6)
HGB BLD-MCNC: 13.7 G/DL (ref 12–15.9)
IMM GRANULOCYTES # BLD AUTO: 0.09 10*3/MM3 (ref 0–0.05)
IMM GRANULOCYTES NFR BLD AUTO: 1 % (ref 0–0.5)
LYMPHOCYTES # BLD AUTO: 2.44 10*3/MM3 (ref 0.7–3.1)
LYMPHOCYTES NFR BLD AUTO: 27.4 % (ref 19.6–45.3)
MCH RBC QN AUTO: 25.8 PG (ref 26.6–33)
MCHC RBC AUTO-ENTMCNC: 30.8 G/DL (ref 31.5–35.7)
MCV RBC AUTO: 84 FL (ref 79–97)
MONOCYTES # BLD AUTO: 0.8 10*3/MM3 (ref 0.1–0.9)
MONOCYTES NFR BLD AUTO: 9 % (ref 5–12)
NEUTROPHILS # BLD AUTO: 5.38 10*3/MM3 (ref 1.7–7)
NEUTROPHILS NFR BLD AUTO: 60.2 % (ref 42.7–76)
PLATELET # BLD AUTO: 262 10*3/MM3 (ref 140–450)
POTASSIUM SERPL-SCNC: 4.2 MMOL/L (ref 3.5–5.2)
PROT SERPL-MCNC: 6.4 G/DL (ref 6–8.5)
RBC # BLD AUTO: 5.3 10*6/MM3 (ref 3.77–5.28)
SODIUM SERPL-SCNC: 144 MMOL/L (ref 136–145)
WBC # BLD AUTO: 8.92 10*3/MM3 (ref 3.4–10.8)

## 2023-09-27 NOTE — ASSESSMENT & PLAN NOTE
Diabetes is improving with treatment, denies hypoglycemia.  Continue current treatment regimen.  Regular aerobic exercise.  Diabetes will be reassessed today with A1c then again in 6 months.

## 2023-09-27 NOTE — ASSESSMENT & PLAN NOTE
Patient's (Body mass index is 33.99 kg/m².) indicates that they are obese (BMI >30) with health conditions that include obstructive sleep apnea, hypertension, diabetes mellitus, dyslipidemias, and GERD . Weight is improving with treatment. BMI  is above average; BMI management plan is completed. We discussed low calorie, low carb based diet program, portion control, increasing exercise, management of depression/anxiety/stress to control compensatory eating, and pharmacologic options including wegovy, metformin .    I have instructed the patient to continue with pursuit of medical weight loss as a part of this program. Patient does meet criteria for use of anorectics at this time as BMI >30  and this medication is indicated for LONG TERM use for management of obesity.   Side effect of treatment: constipation  The current plan for this month includes:   - Continue current exercise efforts  - Continue nutrition focus  - Continue to prioritize protein, fiber, and hydration.   - Treatment goal 180lb.

## 2023-12-05 DIAGNOSIS — E11.9 TYPE 2 DIABETES MELLITUS WITHOUT COMPLICATION, WITHOUT LONG-TERM CURRENT USE OF INSULIN: ICD-10-CM

## 2023-12-05 RX ORDER — METFORMIN HYDROCHLORIDE 500 MG/1
TABLET, EXTENDED RELEASE ORAL
Qty: 270 TABLET | Refills: 0 | Status: SHIPPED | OUTPATIENT
Start: 2023-12-05

## 2023-12-18 ENCOUNTER — TELEMEDICINE (OUTPATIENT)
Dept: BARIATRICS/WEIGHT MGMT | Facility: CLINIC | Age: 48
End: 2023-12-18
Payer: COMMERCIAL

## 2023-12-18 VITALS — HEIGHT: 64 IN | WEIGHT: 197 LBS | BODY MASS INDEX: 33.63 KG/M2

## 2023-12-18 DIAGNOSIS — E66.09 CLASS 1 OBESITY DUE TO EXCESS CALORIES WITH SERIOUS COMORBIDITY AND BODY MASS INDEX (BMI) OF 33.0 TO 33.9 IN ADULT: Primary | ICD-10-CM

## 2023-12-18 DIAGNOSIS — E11.9 TYPE 2 DIABETES MELLITUS WITHOUT COMPLICATION, WITHOUT LONG-TERM CURRENT USE OF INSULIN: ICD-10-CM

## 2023-12-18 RX ORDER — LACTOBACILLUS RHAMNOSUS GG 10B CELL
1 CAPSULE ORAL DAILY
COMMUNITY

## 2023-12-18 RX ORDER — SEMAGLUTIDE 2.4 MG/.75ML
2.4 INJECTION, SOLUTION SUBCUTANEOUS WEEKLY
Qty: 9 ML | Refills: 0 | Status: SHIPPED | OUTPATIENT
Start: 2023-12-18

## 2023-12-18 NOTE — ASSESSMENT & PLAN NOTE
Diabetes is improving with treatment, denies hypoglycemia.  Continue current treatment regimen. Of wegovy and metformin.   Diabetes will be reassessed in 3 months.

## 2023-12-18 NOTE — ASSESSMENT & PLAN NOTE
Patient's (Body mass index is 33.81 kg/m².) indicates that they are obese (BMI >30) with health conditions that include obstructive sleep apnea, hypertension, diabetes mellitus, dyslipidemias, and GERD . Weight is improving with treatment. BMI  is above average; BMI management plan is completed. We discussed low calorie, low carb based diet program, portion control, increasing exercise, and pharmacologic options including wegovy, metformin .    I have instructed the patient to continue with pursuit of medical weight loss as a part of this program. Patient does meet criteria for use of anorectics at this time as BMI >30  and is not at treatment goal.     The current plan for this month includes:   - Continue to prioritize protein, fiber, and hydration.   - Continue current medications. Consider zepbound if affordable for increased weight reduction, has lost 13% BBW with wegovy. Treatment goal 180lb. Briefly discussed option of wellbutrin, her appetite is well controlled and it is not likely to produce significant weight reduction.

## 2023-12-18 NOTE — PROGRESS NOTES
"     Office Note      Date: 2023  Patient Name: Eloise Delatorre  MRN: [id number]  : 1975    This service was conducted via [employer].  Patient is located at her home address.  Provider is located at her work address. The use of a video visit has been reviewed with the patient and informed consent has been obtained.  Subjective  Subjective     Chief Complaint  Obesity Management follow-up    Subjective          Eloise Delatorre presents to [facility] via telehealth for obesity management. Co-existing type 2 diabetes. s/p LSG/liver bx (steatohepatitis) 20 w/ Dr. Guzman. Presurgery weight: 281 pounds (~250lb per patient)    Patient is satisfied with weight loss progress. Appetite is well controlled. Reports no side effects of prescribed medications today. She is not keeping a food journal at this time.   24 hour recall:  B: brie cheese (70 calorie), homemade pomegranate jelly  L: homemade vegetable soup with ground dung (1.5 C)  S: more soup   D: pickle loaf sandwith with lunch meat  S: milk chocolate and cutie  Drinking a lot of water \"I feel like I'm floating\"  She is not checking her blood sugar, it has been normal for so long. She can tell if it is low and she hasn't felt any symptoms of hypoglycmia.   The patient is cleaning a lot- wiped down all the walls in her house. Back pain limits her from doing a lot. Takes a few short walks.     Review of Systems   Constitutional:  Negative for appetite change and fatigue.   Eyes:  Negative for blurred vision, double vision and visual disturbance.   Cardiovascular:  Negative for chest pain and palpitations.   Gastrointestinal:  Negative for abdominal pain, constipation, diarrhea, nausea, vomiting and GERD.   Endocrine: Negative for polydipsia, polyphagia and polyuria.   Musculoskeletal:  Positive for back pain. Negative for arthralgias and myalgias.   Neurological:  Negative for dizziness, tremors, light-headedness, " "headache and memory problem.        Parasthesias negative   Psychiatric/Behavioral:  Negative for sleep disturbance, depressed mood and stress. The patient is not nervous/anxious.          Objective   Start weight: 227 pounds.    Total weight loss: -30 pounds/-13%  Change in weight since last visit: -1lb    Body mass index is 33.81 kg/m².   Measurements (in inches)     Ht 162.6 cm (64\")   Wt 89.4 kg (197 lb)   BMI 33.81 kg/m²       Result Review :                 Assessment and Plan    Diagnoses and all orders for this visit:    1. Class 1 obesity due to excess calories with serious comorbidity and body mass index (BMI) of 33.0 to 33.9 in adult (Primary)  Assessment & Plan:  Patient's (Body mass index is 33.81 kg/m².) indicates that they are obese (BMI >30) with health conditions that include obstructive sleep apnea, hypertension, diabetes mellitus, dyslipidemias, and GERD . Weight is improving with treatment. BMI  is above average; BMI management plan is completed. We discussed low calorie, low carb based diet program, portion control, increasing exercise, and pharmacologic options including wegovy, metformin .    I have instructed the patient to continue with pursuit of medical weight loss as a part of this program. Patient does meet criteria for use of anorectics at this time as BMI >30  and is not at treatment goal.     The current plan for this month includes:   - Continue to prioritize protein, fiber, and hydration.   - Continue current medications. Consider zepbound if affordable for increased weight reduction, has lost 13% BBW with wegovy. Treatment goal 180lb. Briefly discussed option of wellbutrin, her appetite is well controlled and it is not likely to produce significant weight reduction.        Orders:  -     Semaglutide-Weight Management (Wegovy) 2.4 MG/0.75ML solution auto-injector; Inject 2.4 mg under the skin into the appropriate area as directed 1 (One) Time Per Week.  Dispense: 9 mL; Refill: " 0    2. Type 2 diabetes mellitus without complication, without long-term current use of insulin  Assessment & Plan:  Diabetes is improving with treatment, denies hypoglycemia.  Continue current treatment regimen. Of wegovy and metformin.   Diabetes will be reassessed in 3 months.            Follow Up   Return in about 3 months (around 3/18/2024) for Next scheduled follow up, in office.  Patient was given instructions and counseling regarding her condition or for health maintenance advice. Please see specific information pulled into the AVS if appropriate.     Katy Dobson APRN

## 2024-02-28 DIAGNOSIS — E11.9 TYPE 2 DIABETES MELLITUS WITHOUT COMPLICATION, WITHOUT LONG-TERM CURRENT USE OF INSULIN: ICD-10-CM

## 2024-03-01 RX ORDER — METFORMIN HYDROCHLORIDE 500 MG/1
TABLET, EXTENDED RELEASE ORAL
Qty: 270 TABLET | Refills: 0 | Status: SHIPPED | OUTPATIENT
Start: 2024-03-01

## 2024-03-18 ENCOUNTER — OFFICE VISIT (OUTPATIENT)
Dept: BARIATRICS/WEIGHT MGMT | Facility: CLINIC | Age: 49
End: 2024-03-18
Payer: MEDICARE

## 2024-03-18 VITALS
HEIGHT: 64 IN | HEART RATE: 97 BPM | DIASTOLIC BLOOD PRESSURE: 74 MMHG | WEIGHT: 198.8 LBS | BODY MASS INDEX: 33.94 KG/M2 | SYSTOLIC BLOOD PRESSURE: 118 MMHG

## 2024-03-18 DIAGNOSIS — E11.9 TYPE 2 DIABETES MELLITUS WITHOUT COMPLICATION, WITHOUT LONG-TERM CURRENT USE OF INSULIN: ICD-10-CM

## 2024-03-18 DIAGNOSIS — E66.09 CLASS 1 OBESITY DUE TO EXCESS CALORIES WITH SERIOUS COMORBIDITY AND BODY MASS INDEX (BMI) OF 34.0 TO 34.9 IN ADULT: Primary | ICD-10-CM

## 2024-03-18 PROCEDURE — 1160F RVW MEDS BY RX/DR IN RCRD: CPT | Performed by: NURSE PRACTITIONER

## 2024-03-18 PROCEDURE — 1159F MED LIST DOCD IN RCRD: CPT | Performed by: NURSE PRACTITIONER

## 2024-03-18 PROCEDURE — 99214 OFFICE O/P EST MOD 30 MIN: CPT | Performed by: NURSE PRACTITIONER

## 2024-03-18 PROCEDURE — 3078F DIAST BP <80 MM HG: CPT | Performed by: NURSE PRACTITIONER

## 2024-03-18 PROCEDURE — 96372 THER/PROPH/DIAG INJ SC/IM: CPT | Performed by: NURSE PRACTITIONER

## 2024-03-18 PROCEDURE — 3074F SYST BP LT 130 MM HG: CPT | Performed by: NURSE PRACTITIONER

## 2024-03-18 RX ADMIN — CYANOCOBALAMIN 1000 MCG: 1000 INJECTION, SOLUTION INTRAMUSCULAR; SUBCUTANEOUS at 09:52

## 2024-03-18 NOTE — PROGRESS NOTES
[facility]  [address]  [place] [address]     Office Note      Date: 2024  Patient Name: Eloise Delatorre  MRN: [id number]  : 1975  Subjective  Subjective     Chief Complaint  Obesity Management follow-up          Eloise Delatorre presents to [facility] for obesity management. s/p LSG/liver bx (steatohepatitis) 20 w/ Dr. Guzman  Patient is unsatisfied with weight loss progress. She is unable to exercise due to back pain, has been comfort eating some although she and her  gave up chocolate and other sweets. Appetite is moderately controlled. Reports no side effects of prescribed medications today. The patient is taking multivitamin and is taking fish oil.  The patient is not using a food journal.    The patient is not exercising due to flare in back pain since December. Is seeing orthopedist and completed PT. Has MRI scheduled. Just had colonoscopy and had polyps and diverticuli, was told to eat more fiber.   24 hour recall:  1/2 cup cheez-its, 2 egg bites w/ kale, mushrooms, coffee with protein powder, 1/2 strawberry Kazakh, 1 chicken breast, 1 cup broccoli cooked, 1 piece string cheese, 1/2 C canned peas with salt and pepper, 1 cup general [employer] healthy freezer meal chicken w/ rice peas, carrots and sauce.     Review of Systems   Constitutional:  Negative for appetite change and fatigue.   Eyes:  Negative for visual disturbance.   Cardiovascular:  Negative for chest pain and palpitations.   Gastrointestinal:  Negative for constipation and indigestion.   Neurological:  Negative for light-headedness.   All other systems reviewed and are negative.        Current Outpatient Medications:     atorvastatin (LIPITOR) 40 MG tablet, Take 1 tablet by mouth Daily., Disp: , Rfl:     fenofibric acid (TRILIPIX) 135 MG capsule delayed-release delayed release capsule, Take 1 capsule by mouth Daily., Disp: , Rfl:      "lisinopril (PRINIVIL,ZESTRIL) 10 MG tablet, Take 1 tablet by mouth Daily., Disp: , Rfl:     Loratadine-Pseudoephedrine (CLARITIN-D 12 HOUR PO), Take 1 tablet by mouth Daily., Disp: , Rfl:     metFORMIN ER (GLUCOPHAGE-XR) 500 MG 24 hr tablet, TAKE 3 TABLETS BY MOUTH DAILY WITH FOOD, Disp: 270 tablet, Rfl: 0    Multiple Vitamins-Minerals (CENTRUM ULTRA WOMENS) tablet, Take 1 tablet by mouth Daily. patch, Disp: , Rfl:     omeprazole (priLOSEC) 40 MG capsule, Take 1 capsule by mouth Daily., Disp: 90 capsule, Rfl: 0    probiotic (CULTURELLE) capsule capsule, Take 1 capsule by mouth Daily., Disp: , Rfl:     Semaglutide-Weight Management (Wegovy) 2.4 MG/0.75ML solution auto-injector, Inject 2.4 mg under the skin into the appropriate area as directed 1 (One) Time Per Week., Disp: 9 mL, Rfl: 0    Tirzepatide (Mounjaro) 12.5 MG/0.5ML solution pen-injector pen, Inject 0.5 mL under the skin into the appropriate area as directed 1 (One) Time Per Week., Disp: 2 mL, Rfl: 0    Current Facility-Administered Medications:     cyanocobalamin injection 1,000 mcg, 1,000 mcg, Intramuscular, Q28 Days, Katy Dobson APRN, 1,000 mcg at 02/06/23 0842    Objective   Start weight: 227 pounds.    Total Loss lb/%Loss of beginning body weight (BBW): -28.2 lb/-12.42 %  Change in weight since last visit: +1.8    Body mass index is 34.12 kg/m².   Body composition analysis completed and showed:   Body Fat %: 44.5    Measurements (in inches)  Waist Circumference: 45.5      Vital Signs:   /74 (BP Location: Left arm, Patient Position: Sitting)   Pulse 97   Ht 162.6 cm (64\")   Wt 90.2 kg (198 lb 12.8 oz)   BMI 34.12 kg/m²     Physical Exam   General appears stated age and normal appearance   HEENT PERRLA, EOM intact, and conjunctivae normal   Chest/lungs Normal rate, Regular rhythm, and Breathing is unlabored   Extremities without edema   Neuro Good historian and No focal deficit   Skin Warm, dry, intact   Psych normal behavior, normal " thought content, and normal concentration     Result Review :                Assessment / Plan        Diagnoses and all orders for this visit:    1. Class 1 obesity due to excess calories with serious comorbidity and body mass index (BMI) of 34.0 to 34.9 in adult (Primary)  Assessment & Plan:  Patient's (Body mass index is 34.12 kg/m².) indicates that they are obese (BMI >30) with health conditions that include obstructive sleep apnea, hypertension, diabetes mellitus, dyslipidemias, and mood disorder  . Weight is improving with treatment. BMI  is above average; BMI management plan is completed. We discussed low calorie, low carb based diet program, portion control, increasing exercise, management of depression/anxiety/stress to control compensatory eating, and pharmacologic options including mounjaro .    I have instructed the patient to continue with pursuit of medical weight loss as a part of this program. Patient does meet criteria for use of anorectics at this time as BMI >30  and is not at treatment goal.     The current plan for this month includes:   - Continue to work on lifestyle behavioral changes  - Decrease snacking and stress/comfort eating  - Discontinue wegovy and start mounjaro for increased weight loss to alleviate back pain. Plan to increase to 15mg next month if well tolerated.   - Treatment goal 180lb         2. Type 2 diabetes mellitus without complication, without long-term current use of insulin  Overview:  never on insulin, does not check FSBS,   Recently stopped metformin due to recall and started glimepiride.  Has been on victoza at least 6 mos.  Had recent hypoglycemia with lab check, asymptomatic, stopped glimepiride.  10/19/2021: switch to saxenda and titrate up to 3.0mg.  11/21: A1c 6.0  07/2022 A1c 5.9 on wegovy 1.7mg    Assessment & Plan:  Diabetes is stable, denies hypoglycemia.  Medication changes per orders.  Diabetes will be reassessed in 6 months    Orders:  -     Tirzepatide  (Mounjaro) 12.5 MG/0.5ML solution pen-injector pen; Inject 0.5 mL under the skin into the appropriate area as directed 1 (One) Time Per Week.  Dispense: 2 mL; Refill: 0          Follow Up   Return in about 3 months (around 6/18/2024) for Next scheduled follow up.  Patient was given instructions and counseling regarding her condition or for health maintenance advice. Please see specific information pulled into the AVS if appropriate.     Katy Dobson, GIANA  03/18/2024

## 2024-03-18 NOTE — ASSESSMENT & PLAN NOTE
Diabetes is stable, denies hypoglycemia.  Medication changes per orders.  Diabetes will be reassessed in 6 months

## 2024-03-18 NOTE — ASSESSMENT & PLAN NOTE
Patient's (Body mass index is 34.12 kg/m².) indicates that they are obese (BMI >30) with health conditions that include obstructive sleep apnea, hypertension, diabetes mellitus, dyslipidemias, and mood disorder  . Weight is improving with treatment. BMI  is above average; BMI management plan is completed. We discussed low calorie, low carb based diet program, portion control, increasing exercise, management of depression/anxiety/stress to control compensatory eating, and pharmacologic options including mounjaro .    I have instructed the patient to continue with pursuit of medical weight loss as a part of this program. Patient does meet criteria for use of anorectics at this time as BMI >30  and is not at treatment goal.     The current plan for this month includes:   - Continue to work on lifestyle behavioral changes  - Decrease snacking and stress/comfort eating  - Discontinue wegovy and start mounjaro for increased weight loss to alleviate back pain. Plan to increase to 15mg next month if well tolerated.   - Treatment goal 180lb

## 2024-03-28 ENCOUNTER — OFFICE VISIT (OUTPATIENT)
Dept: BARIATRICS/WEIGHT MGMT | Facility: CLINIC | Age: 49
End: 2024-03-28
Payer: COMMERCIAL

## 2024-03-28 VITALS
OXYGEN SATURATION: 97 % | BODY MASS INDEX: 34.06 KG/M2 | TEMPERATURE: 97.3 F | DIASTOLIC BLOOD PRESSURE: 76 MMHG | SYSTOLIC BLOOD PRESSURE: 120 MMHG | HEIGHT: 64 IN | HEART RATE: 65 BPM | WEIGHT: 199.5 LBS

## 2024-03-28 DIAGNOSIS — R10.13 DYSPEPSIA: ICD-10-CM

## 2024-03-28 DIAGNOSIS — E66.9 DIABETES MELLITUS TYPE 2 IN OBESE: ICD-10-CM

## 2024-03-28 DIAGNOSIS — K21.9 GASTROESOPHAGEAL REFLUX DISEASE, UNSPECIFIED WHETHER ESOPHAGITIS PRESENT: ICD-10-CM

## 2024-03-28 DIAGNOSIS — E11.69 DIABETES MELLITUS TYPE 2 IN OBESE: ICD-10-CM

## 2024-03-28 DIAGNOSIS — R11.0 CHRONIC NAUSEA: ICD-10-CM

## 2024-03-28 DIAGNOSIS — E66.9 OBESITY, CLASS I, BMI 30-34.9: Primary | ICD-10-CM

## 2024-03-28 DIAGNOSIS — I10 ESSENTIAL HYPERTENSION: ICD-10-CM

## 2024-03-28 DIAGNOSIS — E55.9 VITAMIN D DEFICIENCY: ICD-10-CM

## 2024-03-28 DIAGNOSIS — R53.83 FATIGUE, UNSPECIFIED TYPE: ICD-10-CM

## 2024-03-28 DIAGNOSIS — Z98.84 S/P BARIATRIC SURGERY: ICD-10-CM

## 2024-03-28 DIAGNOSIS — D50.9 IRON DEFICIENCY ANEMIA, UNSPECIFIED IRON DEFICIENCY ANEMIA TYPE: ICD-10-CM

## 2024-03-28 NOTE — PROGRESS NOTES
[facility]  [address]  [address]  [facility] [id number] [phone]        Patient Name:  Eloise Delatorre  :  1975      Date of Visit: 2024      Reason for Visit:   Annual Eval - 4 years postop        HPI: Eloise Delatorre is a 48 y.o. female s/p LSG/liver bx (steatohepatitis) 20 w/ Dr. Guzman.     Doing fine overall, but admits she is still discouraged w/ her weight loss progress.  Following w/ [facility] - on Wegovy, will be switching to Mounjaro.  Exercise really limited d/t chronic back pain, as well as excess abdominal skin - wishes to pursue plastics eval.      Not tracking intake.  Admits she still struggles w/ protein intake - getting 60g prot/day.      Has episodic nausea, more on an empty stomach, takes a couple bites and symptoms resolve - chronic issue, unchanged.  She feels issues are likely related to gas. Takes a stool softener + daily probiotic.  Does still have gallbladder, denies postop eval.      Reflux controlled w/ daily PPI.  Continues w/ hair loss.      Recent PCP labs 24 reveal ROSSY, advised BID iron.  Following w/ GI.        Taking MVI, iron BID and daily Vit D + monthly B12 injections.        Presurgery weight: 281 pounds.  Today's weight is 90.5 kg (199 lb 8 oz) pounds, today's  Body mass index is 34.24 kg/m²., and weight loss since surgery is 82 pounds.      Past Medical History:   Diagnosis Date    Anemia     Anxiety     Arthritis     Asthma     allergy induced, prn inhalers    Chronic back pain     Depression     Dyspepsia     w/ postprandial nausea + abd.pain    Dyspnea on exertion     Fatigue     Fatty liver     Fibromyalgia     Frequent UTI     GERD (gastroesophageal reflux disease)     Hyperlipidemia     Hypertension     Hypertriglyceridemia     Iron deficiency     Morbid obesity     Psoriasis     Sleep apnea     Type 2 diabetes mellitus     Vitamin D deficiency     Wears glasses      Past Surgical History:   Procedure  Laterality Date    BACK SURGERY  2012    L4-L5    BLADDER SUSPENSION  2009    during hysterectomy    COLONOSCOPY  2015    unremarkable, but w/ hx polyps    DIAGNOSTIC LAPAROSCOPY  1994    x 8 ([phone]) for endometriosis    ENDOSCOPY N/A 02/25/2020    Procedure: ESOPHAGOGASTRODUODENOSCOPY;  Surgeon: Lila Guzman MD;  Location:  [facility];  Service: Bariatric;  Laterality: N/A;    GASTRIC SLEEVE LAPAROSCOPIC N/A 02/25/2020    Procedure: GASTRIC SLEEVE LAPAROSCOPIC;  Surgeon: Lila Guzman MD;  Location:  [facility];  Service: Bariatric;  Laterality: N/A;    LIVER BIOPSY N/A 02/25/2020    Procedure: LIVER BIOPSY LAPAROSCOPIC;  Surgeon: Lila Guzman MD;  Location:  [facility];  Service: Bariatric;  Laterality: N/A;    TOTAL LAPAROSCOPIC HYSTERECTOMY  2009    for endometriosis    WISDOM TOOTH EXTRACTION  1992     Outpatient Medications Marked as Taking for the 3/28/24 encounter (Office Visit) with Latanya Jo PA   Medication Sig Dispense Refill    atorvastatin (LIPITOR) 40 MG tablet Take 1 tablet by mouth Daily.      fenofibric acid (TRILIPIX) 135 MG capsule delayed-release delayed release capsule Take 1 capsule by mouth Daily.      lisinopril (PRINIVIL,ZESTRIL) 10 MG tablet Take 1 tablet by mouth Daily.      Loratadine-Pseudoephedrine (CLARITIN-D 12 HOUR PO) Take 1 tablet by mouth Daily.      metFORMIN ER (GLUCOPHAGE-XR) 500 MG 24 hr tablet TAKE 3 TABLETS BY MOUTH DAILY WITH FOOD 270 tablet 0    Multiple Vitamins-Minerals (CENTRUM ULTRA WOMENS) tablet Take 1 tablet by mouth Daily. patch      omeprazole (priLOSEC) 40 MG capsule Take 1 capsule by mouth Daily. 90 capsule 0    probiotic (CULTURELLE) capsule capsule Take 1 capsule by mouth Daily.      Semaglutide-Weight Management (Wegovy) 2.4 MG/0.75ML solution auto-injector Inject 2.4 mg under the skin into the appropriate area as directed 1 (One) Time Per Week. 9 mL 0     Current Facility-Administered Medications for the 3/28/24 encounter  "(Office Visit) with Latanya Jo PA   Medication Dose Route Frequency Provider Last Rate Last Admin    cyanocobalamin injection 1,000 mcg  1,000 mcg Intramuscular Q28 Days Katy Dobson APRN   1,000 mcg at 03/18/24 0952       No Known Allergies    Social History     Socioeconomic History    Marital status:    Tobacco Use    Smoking status: Former     Current packs/day: 0.00     Average packs/day: 3.0 packs/day for 20.0 years (60.0 ttl pk-yrs)     Types: Cigarettes     Start date: 1/1/1989     Quit date: 1/1/2009     Years since quitting: 15.2    Smokeless tobacco: Never   Vaping Use    Vaping status: Never Used   Substance and Sexual Activity    Alcohol use: No    Drug use: Never    Sexual activity: Not Currently     Partners: Male     Birth control/protection: Abstinence, None     Comment: Hysterectomy     Social History     Social History Narrative    Living in [place], [place] w/  and daughter.  On disability since 2/2019 for back pain.  Formerly worked w/ [employer] x 17 years.       /76 (BP Location: Right arm, Patient Position: Sitting)   Pulse 65   Temp 97.3 °F (36.3 °C)   Ht 162.6 cm (64\")   Wt 90.5 kg (199 lb 8 oz)   SpO2 97%   BMI 34.24 kg/m²     Physical Exam  Constitutional:       Appearance: She is well-developed.   Cardiovascular:      Rate and Rhythm: Normal rate.   Pulmonary:      Effort: Pulmonary effort is normal.   Musculoskeletal:         General: Normal range of motion.   Neurological:      Mental Status: She is alert.   Psychiatric:         Thought Content: Thought content normal.         Judgment: Judgment normal.           Assessment:  4 years s/p LSG/liver bx (steatohepatitis) 2/25/20 w/ Dr. Guzman.     ICD-10-CM ICD-9-CM   1. Obesity, Class I, BMI 30-34.9  E66.9 278.00   2. Diabetes mellitus type 2 in obese  E11.69 250.00    E66.9 278.00   3. Essential hypertension  I10 401.9   4. Gastroesophageal reflux disease, unspecified whether esophagitis present  " K21.9 530.81   5. Fatigue, unspecified type  R53.83 780.79   6. Iron deficiency anemia, unspecified iron deficiency anemia type  D50.9 280.9   7. Vitamin D deficiency  E55.9 268.9   8. Chronic nausea  R11.0 787.02   9. Dyspepsia  R10.13 536.8   10. S/P bariatric surgery  Z98.84 V45.86       BMI is >= 30 and <35. (Class 1 Obesity). The following options were offered after discussion;: see plan.     Plan:   Will further evaluate gallbladder given ongoing complaints of nausea.  Continue w/ good food choices and healthy habits.  Recommended protein 100g/day - start tracking intake again.  Follow up w/ dietitian to discuss increasing protein and incorporating more iron-rich foods.  Increase routine exercise/activity as able - will refer for exercise eval.  Bariatric labs ordered.  Continue current vitamin regimen.  Will refer to  Plastic Surgery for consult/further eval.  Additional input to follow.  Call w/ problems/concerns.     The patient was instructed to follow up in 1 year, sooner if needed.      I spent 40 minutes on this patient encounter, which includes chart review/documentation, evaluation & management, patient education and counseling r/t healthy habits and necessary dietary/lifestyle modifications for continued success/weight loss.

## 2024-03-29 ENCOUNTER — TELEPHONE (OUTPATIENT)
Dept: BARIATRICS/WEIGHT MGMT | Facility: CLINIC | Age: 49
End: 2024-03-29
Payer: COMMERCIAL

## 2024-03-29 NOTE — TELEPHONE ENCOUNTER
Called pt post op bariatrics re increasing protein and iron  Pt unable to talk at this time, will call back in a week

## 2024-04-03 LAB
25(OH)D3+25(OH)D2 SERPL-MCNC: 49.5 NG/ML (ref 30–100)
ALBUMIN SERPL-MCNC: 4.3 G/DL (ref 3.5–5.2)
ALBUMIN/GLOB SERPL: 2.4 G/DL
ALP SERPL-CCNC: 73 U/L (ref 39–117)
ALT SERPL-CCNC: 17 U/L (ref 1–33)
AST SERPL-CCNC: 14 U/L (ref 1–32)
BASOPHILS # BLD AUTO: 0.04 10*3/MM3 (ref 0–0.2)
BASOPHILS NFR BLD AUTO: 0.5 % (ref 0–1.5)
BILIRUB SERPL-MCNC: 0.2 MG/DL (ref 0–1.2)
BUN SERPL-MCNC: 16 MG/DL (ref 6–20)
BUN/CREAT SERPL: 19.8 (ref 7–25)
CALCIUM SERPL-MCNC: 9.7 MG/DL (ref 8.6–10.5)
CHLORIDE SERPL-SCNC: 108 MMOL/L (ref 98–107)
CO2 SERPL-SCNC: 27.9 MMOL/L (ref 22–29)
CREAT SERPL-MCNC: 0.81 MG/DL (ref 0.57–1)
EGFRCR SERPLBLD CKD-EPI 2021: 89.1 ML/MIN/1.73
EOSINOPHIL # BLD AUTO: 0.19 10*3/MM3 (ref 0–0.4)
EOSINOPHIL NFR BLD AUTO: 2.4 % (ref 0.3–6.2)
ERYTHROCYTE [DISTWIDTH] IN BLOOD BY AUTOMATED COUNT: 15.3 % (ref 12.3–15.4)
FERRITIN SERPL-MCNC: 12.6 NG/ML (ref 13–150)
FOLATE SERPL-MCNC: >20 NG/ML (ref 4.78–24.2)
GLOBULIN SER CALC-MCNC: 1.8 GM/DL
GLUCOSE SERPL-MCNC: 83 MG/DL (ref 65–99)
HCT VFR BLD AUTO: 39.4 % (ref 34–46.6)
HGB BLD-MCNC: 11.8 G/DL (ref 12–15.9)
IMM GRANULOCYTES # BLD AUTO: 0.04 10*3/MM3 (ref 0–0.05)
IMM GRANULOCYTES NFR BLD AUTO: 0.5 % (ref 0–0.5)
IRON SERPL-MCNC: 21 MCG/DL (ref 37–145)
LYMPHOCYTES # BLD AUTO: 2.37 10*3/MM3 (ref 0.7–3.1)
LYMPHOCYTES NFR BLD AUTO: 29.7 % (ref 19.6–45.3)
MCH RBC QN AUTO: 24.9 PG (ref 26.6–33)
MCHC RBC AUTO-ENTMCNC: 29.9 G/DL (ref 31.5–35.7)
MCV RBC AUTO: 83.3 FL (ref 79–97)
METHYLMALONATE SERPL-SCNC: 136 NMOL/L (ref 0–378)
MONOCYTES # BLD AUTO: 0.93 10*3/MM3 (ref 0.1–0.9)
MONOCYTES NFR BLD AUTO: 11.6 % (ref 5–12)
NEUTROPHILS # BLD AUTO: 4.42 10*3/MM3 (ref 1.7–7)
NEUTROPHILS NFR BLD AUTO: 55.3 % (ref 42.7–76)
PLATELET # BLD AUTO: 317 10*3/MM3 (ref 140–450)
POTASSIUM SERPL-SCNC: 4.5 MMOL/L (ref 3.5–5.2)
PREALB SERPL-MCNC: 32 MG/DL (ref 12–34)
PROT SERPL-MCNC: 6.1 G/DL (ref 6–8.5)
RBC # BLD AUTO: 4.73 10*6/MM3 (ref 3.77–5.28)
SODIUM SERPL-SCNC: 145 MMOL/L (ref 136–145)
VIT B1 BLD-SCNC: 196 NMOL/L (ref 66.5–200)
WBC # BLD AUTO: 7.99 10*3/MM3 (ref 3.4–10.8)

## 2024-04-12 DIAGNOSIS — E11.9 TYPE 2 DIABETES MELLITUS WITHOUT COMPLICATION, WITHOUT LONG-TERM CURRENT USE OF INSULIN: ICD-10-CM

## 2024-04-16 ENCOUNTER — TELEPHONE (OUTPATIENT)
Dept: BARIATRICS/WEIGHT MGMT | Facility: CLINIC | Age: 49
End: 2024-04-16
Payer: COMMERCIAL

## 2024-04-16 NOTE — TELEPHONE ENCOUNTER
Called pt post op sleeve (4 years ago) re increasing protein intake and incorporating iron rich foods. Phone got cut off, called back and left a voicemail asking pt to call back

## 2024-04-21 PROBLEM — D50.9 IRON DEFICIENCY ANEMIA: Status: ACTIVE | Noted: 2024-04-21

## 2024-04-21 PROBLEM — K90.89 POOR IRON ABSORPTION: Status: ACTIVE | Noted: 2024-04-21

## 2024-04-29 ENCOUNTER — HOSPITAL ENCOUNTER (OUTPATIENT)
Facility: HOSPITAL | Age: 49
Discharge: HOME | End: 2024-04-29
Admitting: PHYSICIAN ASSISTANT
Payer: COMMERCIAL

## 2024-04-29 DIAGNOSIS — E11.9 TYPE 2 DIABETES MELLITUS WITHOUT COMPLICATION, WITHOUT LONG-TERM CURRENT USE OF INSULIN: ICD-10-CM

## 2024-04-29 DIAGNOSIS — R11.0 CHRONIC NAUSEA: ICD-10-CM

## 2024-04-29 DIAGNOSIS — R10.13 DYSPEPSIA: ICD-10-CM

## 2024-04-29 PROCEDURE — 76705 ECHO EXAM OF ABDOMEN: CPT

## 2024-05-02 DIAGNOSIS — R11.0 CHRONIC NAUSEA: Primary | ICD-10-CM

## 2024-05-09 ENCOUNTER — INFUSION (OUTPATIENT)
Dept: ONCOLOGY | Facility: HOSPITAL | Age: 49
End: 2024-05-09
Payer: COMMERCIAL

## 2024-05-09 VITALS
HEART RATE: 77 BPM | RESPIRATION RATE: 17 BRPM | TEMPERATURE: 96.8 F | BODY MASS INDEX: 34.54 KG/M2 | SYSTOLIC BLOOD PRESSURE: 112 MMHG | DIASTOLIC BLOOD PRESSURE: 62 MMHG | WEIGHT: 201.2 LBS

## 2024-05-09 DIAGNOSIS — K90.89 POOR IRON ABSORPTION: Primary | ICD-10-CM

## 2024-05-09 DIAGNOSIS — D50.9 IRON DEFICIENCY ANEMIA, UNSPECIFIED IRON DEFICIENCY ANEMIA TYPE: ICD-10-CM

## 2024-05-09 PROCEDURE — 63710000001 ACETAMINOPHEN 325 MG TABLET: Performed by: PHYSICIAN ASSISTANT

## 2024-05-09 PROCEDURE — 96365 THER/PROPH/DIAG IV INF INIT: CPT

## 2024-05-09 PROCEDURE — 25010000002 IRON SUCROSE PER 1 MG: Performed by: PHYSICIAN ASSISTANT

## 2024-05-09 PROCEDURE — 25810000003 SODIUM CHLORIDE 0.9 % SOLUTION: Performed by: PHYSICIAN ASSISTANT

## 2024-05-09 PROCEDURE — A9270 NON-COVERED ITEM OR SERVICE: HCPCS | Performed by: PHYSICIAN ASSISTANT

## 2024-05-09 PROCEDURE — 96374 THER/PROPH/DIAG INJ IV PUSH: CPT

## 2024-05-09 PROCEDURE — 63710000001 DIPHENHYDRAMINE PER 50 MG: Performed by: PHYSICIAN ASSISTANT

## 2024-05-09 PROCEDURE — 63710000001 FAMOTIDINE 20 MG TABLET: Performed by: PHYSICIAN ASSISTANT

## 2024-05-09 RX ORDER — ACETAMINOPHEN 325 MG/1
650 TABLET ORAL ONCE
Status: COMPLETED | OUTPATIENT
Start: 2024-05-09 | End: 2024-05-09

## 2024-05-09 RX ORDER — SODIUM CHLORIDE 9 MG/ML
20 INJECTION, SOLUTION INTRAVENOUS ONCE
Status: COMPLETED | OUTPATIENT
Start: 2024-05-09 | End: 2024-05-09

## 2024-05-09 RX ORDER — DIPHENHYDRAMINE HYDROCHLORIDE 50 MG/ML
50 INJECTION INTRAMUSCULAR; INTRAVENOUS AS NEEDED
OUTPATIENT
Start: 2024-05-09

## 2024-05-09 RX ORDER — DIPHENHYDRAMINE HCL 25 MG
25 CAPSULE ORAL ONCE
Status: COMPLETED | OUTPATIENT
Start: 2024-05-09 | End: 2024-05-09

## 2024-05-09 RX ORDER — FAMOTIDINE 20 MG/1
20 TABLET, FILM COATED ORAL ONCE
OUTPATIENT
Start: 2024-05-09

## 2024-05-09 RX ORDER — ACETAMINOPHEN 325 MG/1
650 TABLET ORAL ONCE
OUTPATIENT
Start: 2024-05-09

## 2024-05-09 RX ORDER — FAMOTIDINE 20 MG/1
20 TABLET, FILM COATED ORAL ONCE
Status: COMPLETED | OUTPATIENT
Start: 2024-05-09 | End: 2024-05-09

## 2024-05-09 RX ORDER — SODIUM CHLORIDE 9 MG/ML
20 INJECTION, SOLUTION INTRAVENOUS ONCE
OUTPATIENT
Start: 2024-05-09

## 2024-05-09 RX ORDER — DIPHENHYDRAMINE HCL 25 MG
25 CAPSULE ORAL ONCE
OUTPATIENT
Start: 2024-05-09

## 2024-05-09 RX ORDER — FAMOTIDINE 10 MG/ML
20 INJECTION, SOLUTION INTRAVENOUS AS NEEDED
OUTPATIENT
Start: 2024-05-09

## 2024-05-09 RX ADMIN — DIPHENHYDRAMINE HYDROCHLORIDE 25 MG: 25 CAPSULE ORAL at 08:28

## 2024-05-09 RX ADMIN — ACETAMINOPHEN 650 MG: 325 TABLET, FILM COATED ORAL at 08:28

## 2024-05-09 RX ADMIN — IRON SUCROSE 200 MG: 20 INJECTION, SOLUTION INTRAVENOUS at 08:34

## 2024-05-09 RX ADMIN — FAMOTIDINE 20 MG: 20 TABLET, FILM COATED ORAL at 08:28

## 2024-05-09 RX ADMIN — SODIUM CHLORIDE 20 ML/HR: 9 INJECTION, SOLUTION INTRAVENOUS at 08:34

## 2024-05-16 ENCOUNTER — INFUSION (OUTPATIENT)
Dept: ONCOLOGY | Facility: HOSPITAL | Age: 49
End: 2024-05-16
Payer: COMMERCIAL

## 2024-05-16 VITALS
WEIGHT: 202 LBS | TEMPERATURE: 97.5 F | SYSTOLIC BLOOD PRESSURE: 115 MMHG | BODY MASS INDEX: 34.67 KG/M2 | DIASTOLIC BLOOD PRESSURE: 66 MMHG | HEART RATE: 83 BPM | RESPIRATION RATE: 18 BRPM

## 2024-05-16 DIAGNOSIS — K90.89 POOR IRON ABSORPTION: Primary | ICD-10-CM

## 2024-05-16 DIAGNOSIS — D50.9 IRON DEFICIENCY ANEMIA, UNSPECIFIED IRON DEFICIENCY ANEMIA TYPE: ICD-10-CM

## 2024-05-16 PROCEDURE — 96365 THER/PROPH/DIAG IV INF INIT: CPT

## 2024-05-16 PROCEDURE — 63710000001 DIPHENHYDRAMINE PER 50 MG: Performed by: PHYSICIAN ASSISTANT

## 2024-05-16 PROCEDURE — A9270 NON-COVERED ITEM OR SERVICE: HCPCS | Performed by: PHYSICIAN ASSISTANT

## 2024-05-16 PROCEDURE — 96374 THER/PROPH/DIAG INJ IV PUSH: CPT

## 2024-05-16 PROCEDURE — 63710000001 ACETAMINOPHEN 325 MG TABLET: Performed by: PHYSICIAN ASSISTANT

## 2024-05-16 PROCEDURE — 63710000001 FAMOTIDINE 20 MG TABLET: Performed by: PHYSICIAN ASSISTANT

## 2024-05-16 PROCEDURE — 25810000003 SODIUM CHLORIDE 0.9 % SOLUTION: Performed by: PHYSICIAN ASSISTANT

## 2024-05-16 PROCEDURE — 25010000002 IRON SUCROSE PER 1 MG: Performed by: PHYSICIAN ASSISTANT

## 2024-05-16 RX ORDER — ACETAMINOPHEN 325 MG/1
650 TABLET ORAL ONCE
Status: CANCELLED | OUTPATIENT
Start: 2024-05-16

## 2024-05-16 RX ORDER — SODIUM CHLORIDE 9 MG/ML
20 INJECTION, SOLUTION INTRAVENOUS ONCE
Status: COMPLETED | OUTPATIENT
Start: 2024-05-16 | End: 2024-05-16

## 2024-05-16 RX ORDER — DIPHENHYDRAMINE HCL 25 MG
25 CAPSULE ORAL ONCE
Status: CANCELLED | OUTPATIENT
Start: 2024-05-16

## 2024-05-16 RX ORDER — SODIUM CHLORIDE 9 MG/ML
20 INJECTION, SOLUTION INTRAVENOUS ONCE
Status: CANCELLED | OUTPATIENT
Start: 2024-05-16

## 2024-05-16 RX ORDER — DIPHENHYDRAMINE HYDROCHLORIDE 50 MG/ML
50 INJECTION INTRAMUSCULAR; INTRAVENOUS AS NEEDED
OUTPATIENT
Start: 2024-05-16

## 2024-05-16 RX ORDER — FAMOTIDINE 20 MG/1
20 TABLET, FILM COATED ORAL ONCE
Status: CANCELLED | OUTPATIENT
Start: 2024-05-16

## 2024-05-16 RX ORDER — FAMOTIDINE 20 MG/1
20 TABLET, FILM COATED ORAL ONCE
Status: COMPLETED | OUTPATIENT
Start: 2024-05-16 | End: 2024-05-16

## 2024-05-16 RX ORDER — DIPHENHYDRAMINE HCL 25 MG
25 CAPSULE ORAL ONCE
Status: COMPLETED | OUTPATIENT
Start: 2024-05-16 | End: 2024-05-16

## 2024-05-16 RX ORDER — FAMOTIDINE 10 MG/ML
20 INJECTION, SOLUTION INTRAVENOUS AS NEEDED
OUTPATIENT
Start: 2024-05-16

## 2024-05-16 RX ORDER — ACETAMINOPHEN 325 MG/1
650 TABLET ORAL ONCE
Status: COMPLETED | OUTPATIENT
Start: 2024-05-16 | End: 2024-05-16

## 2024-05-16 RX ADMIN — ACETAMINOPHEN 650 MG: 325 TABLET ORAL at 08:24

## 2024-05-16 RX ADMIN — SODIUM CHLORIDE 20 ML/HR: 9 INJECTION, SOLUTION INTRAVENOUS at 08:31

## 2024-05-16 RX ADMIN — IRON SUCROSE 200 MG: 20 INJECTION, SOLUTION INTRAVENOUS at 08:31

## 2024-05-16 RX ADMIN — DIPHENHYDRAMINE HYDROCHLORIDE 25 MG: 25 CAPSULE ORAL at 08:24

## 2024-05-16 RX ADMIN — FAMOTIDINE 20 MG: 20 TABLET, FILM COATED ORAL at 08:24

## 2024-06-17 ENCOUNTER — OFFICE VISIT (OUTPATIENT)
Dept: BARIATRICS/WEIGHT MGMT | Facility: CLINIC | Age: 49
End: 2024-06-17
Payer: COMMERCIAL

## 2024-06-17 VITALS
DIASTOLIC BLOOD PRESSURE: 66 MMHG | BODY MASS INDEX: 33.67 KG/M2 | HEIGHT: 64 IN | WEIGHT: 197.2 LBS | HEART RATE: 90 BPM | SYSTOLIC BLOOD PRESSURE: 108 MMHG

## 2024-06-17 DIAGNOSIS — E11.9 TYPE 2 DIABETES MELLITUS WITHOUT COMPLICATION, WITHOUT LONG-TERM CURRENT USE OF INSULIN: ICD-10-CM

## 2024-06-17 DIAGNOSIS — R53.83 FATIGUE, UNSPECIFIED TYPE: ICD-10-CM

## 2024-06-17 DIAGNOSIS — E66.09 CLASS 1 OBESITY DUE TO EXCESS CALORIES WITH SERIOUS COMORBIDITY AND BODY MASS INDEX (BMI) OF 33.0 TO 33.9 IN ADULT: Primary | ICD-10-CM

## 2024-06-17 PROCEDURE — 96372 THER/PROPH/DIAG INJ SC/IM: CPT | Performed by: NURSE PRACTITIONER

## 2024-06-17 PROCEDURE — 99214 OFFICE O/P EST MOD 30 MIN: CPT | Performed by: NURSE PRACTITIONER

## 2024-06-17 RX ORDER — METFORMIN HYDROCHLORIDE 500 MG/1
TABLET, EXTENDED RELEASE ORAL
Qty: 270 TABLET | Refills: 0 | Status: SHIPPED | OUTPATIENT
Start: 2024-06-17

## 2024-06-17 RX ORDER — FERROUS SULFATE 324(65)MG
324 TABLET, DELAYED RELEASE (ENTERIC COATED) ORAL 2 TIMES DAILY WITH MEALS
COMMUNITY

## 2024-06-17 RX ADMIN — CYANOCOBALAMIN 1000 MCG: 1000 INJECTION, SOLUTION INTRAMUSCULAR; SUBCUTANEOUS at 09:55

## 2024-06-17 NOTE — PROGRESS NOTES
[facility]  [address]  [place] [address]     Office Note      Date: 2024  Patient Name: Eloise Delatorre  MRN: [id number]  : 1975  Subjective  Subjective     Chief Complaint  Obesity Management follow-up          Eloise Delatorre presents to [facility] for obesity management. s/p LSG/liver bx (steatohepatitis) 20 w/ Dr. Guzman. Presurgery weight: 281 pounds (~250lb per patient)  Patient is unsure with weight loss progress. Appetite is moderately controlled. Reports continued intermittent hair loss on the top of her head. Takes biotin, occasionally takes collagen. Uses topical products but they get expensive. The patient is taking multivitamin and is taking fish oil.  The patient is not using a food journal.    The patient is more active in daily activities (watering plants) but she cannot tolerate exercises with impact. States if she takes the steps she regrets it but has found ways to add steps.   24 hour recall:   2 boiled eggs  4 stalks celery  2 carrots from garden  1 C coffee w/ oatmilk zero sugar  1 banana  1 apple w/ 1 T PB  1 slice donatos pizza  2 dill pickle hilton  1 C raspberries & blueberries     Review of Systems   Constitutional:  Negative for appetite change and fatigue.   Eyes:  Negative for visual disturbance.   Cardiovascular:  Negative for chest pain and palpitations.   Gastrointestinal:  Negative for constipation and indigestion.   Neurological:  Negative for light-headedness.         Current Outpatient Medications:     atorvastatin (LIPITOR) 40 MG tablet, Take 1 tablet by mouth Daily., Disp: , Rfl:     fenofibric acid (TRILIPIX) 135 MG capsule delayed-release delayed release capsule, Take 1 capsule by mouth Daily., Disp: , Rfl:     ferrous sulfate 324 (65 Fe) MG tablet delayed-release EC tablet, Take 1 tablet by mouth 2 (Two) Times a Day With Meals., Disp: , Rfl:     lisinopril (PRINIVIL,ZESTRIL) 10 MG  "tablet, Take 1 tablet by mouth Daily., Disp: , Rfl:     Loratadine-Pseudoephedrine (CLARITIN-D 12 HOUR PO), Take 1 tablet by mouth Daily., Disp: , Rfl:     metFORMIN ER (GLUCOPHAGE-XR) 500 MG 24 hr tablet, TAKE 3 TABLETS BY MOUTH DAILY WITH FOOD., Disp: 270 tablet, Rfl: 0    Multiple Vitamins-Minerals (CENTRUM ULTRA WOMENS) tablet, Take 1 tablet by mouth Daily. patch, Disp: , Rfl:     omeprazole (priLOSEC) 40 MG capsule, Take 1 capsule by mouth Daily., Disp: 90 capsule, Rfl: 0    probiotic (CULTURELLE) capsule capsule, Take 1 capsule by mouth Daily., Disp: , Rfl:     Tirzepatide (Mounjaro) 12.5 MG/0.5ML solution pen-injector pen, Inject 0.5 mL under the skin into the appropriate area as directed 1 (One) Time Per Week., Disp: 6 mL, Rfl: 0    Tirzepatide (Mounjaro) 15 MG/0.5ML solution pen-injector pen, Inject 0.5 mL under the skin into the appropriate area as directed 1 (One) Time Per Week., Disp: 6 mL, Rfl: 0    Current Facility-Administered Medications:     cyanocobalamin injection 1,000 mcg, 1,000 mcg, Intramuscular, Q28 Days, Katy Dobson APRN, 1,000 mcg at 03/18/24 0952    Objective   Start weight: 227 pounds.    Total Loss lb/%Loss of beginning body weight (BBW): -29.8lb/-13.12%  Change in weight since last visit: -0.9    Body mass index is 33.85 kg/m².   Body composition analysis completed and showed:   Body Fat %: 44.0    Measurements (in inches)  Waist Circumference: 48      Vital Signs:   /66 (BP Location: Right arm, Patient Position: Sitting)   Pulse 90   Ht 162.6 cm (64\")   Wt 89.4 kg (197 lb 3.2 oz)   BMI 33.85 kg/m²     Physical Exam   General appears stated age and normal appearance   HEENT PERRLA, EOM intact, and conjunctivae normal   Chest/lungs Normal rate, Regular rhythm, and Breathing is unlabored   Extremities without edema   Neuro Good historian and No focal deficit   Skin Warm, dry, intact   Psych normal behavior, normal thought content, and normal concentration     Result Review " :                Assessment / Plan        Diagnoses and all orders for this visit:    1. Class 1 obesity due to excess calories with serious comorbidity and body mass index (BMI) of 33.0 to 33.9 in adult (Primary)  Assessment & Plan:  Patient's (Body mass index is 33.85 kg/m².) indicates that they are obese (BMI >30) with health conditions that include obstructive sleep apnea, hypertension, diabetes mellitus, dyslipidemias, and GERD . Weight is improving with treatment. BMI  is above average; BMI management plan is completed. We discussed low calorie, low carb based diet program, portion control, increasing exercise, and pharmacologic options including mounjaro, metformin .     I have instructed the patient to continue with pursuit of medical weight loss as a part of this program. Patient does meet criteria for use of anorectics at this time as BMI >30  and is not at treatment goal.     The current plan for this month includes:   - Continue to prioritize protein, fiber, and hydration.   - Increase mounjaro to 15mg, no side effects thus far.  - Treatment goal 180lb.         2. Type 2 diabetes mellitus without complication, without long-term current use of insulin  Overview:  never on insulin, does not check FSBS,   Recently stopped metformin due to recall and started glimepiride.  Has been on victoza at least 6 mos.  Had recent hypoglycemia with lab check, asymptomatic, stopped glimepiride.  10/19/2021: switch to saxenda and titrate up to 3.0mg.  11/21: A1c 6.0  07/2022 A1c 5.9 on wegovy 1.7mg    Assessment & Plan:  Diabetes is stable.   Continue current treatment regimen.   Increase mounjaro to 15mg for optimal treatment of co-existing obesity.   Diabetes will be reassessed in 3 months    Orders:  -     Tirzepatide (Mounjaro) 15 MG/0.5ML solution pen-injector pen; Inject 0.5 mL under the skin into the appropriate area as directed 1 (One) Time Per Week.  Dispense: 6 mL; Refill: 0  -     metFORMIN ER (GLUCOPHAGE-XR)  500 MG 24 hr tablet; TAKE 3 TABLETS BY MOUTH DAILY WITH FOOD.  Dispense: 270 tablet; Refill: 0    3. Fatigue, unspecified type          Follow Up   Return in about 3 months (around 9/17/2024) for Next scheduled follow up.  Patient was given instructions and counseling regarding her condition or for health maintenance advice. Please see specific information pulled into the AVS if appropriate.     GIANA Bell  06/17/2024

## 2024-06-17 NOTE — ASSESSMENT & PLAN NOTE
Patient's (Body mass index is 33.85 kg/m².) indicates that they are obese (BMI >30) with health conditions that include obstructive sleep apnea, hypertension, diabetes mellitus, dyslipidemias, and GERD . Weight is improving with treatment. BMI  is above average; BMI management plan is completed. We discussed low calorie, low carb based diet program, portion control, increasing exercise, and pharmacologic options including mounjaro, metformin .     I have instructed the patient to continue with pursuit of medical weight loss as a part of this program. Patient does meet criteria for use of anorectics at this time as BMI >30  and is not at treatment goal.     The current plan for this month includes:   - Continue to prioritize protein, fiber, and hydration.   - Increase mounjaro to 15mg, no side effects thus far.  - Treatment goal 180lb.      Heart failure, left, with LVEF <=30%

## 2024-06-17 NOTE — ASSESSMENT & PLAN NOTE
Diabetes is stable.   Continue current treatment regimen.   Increase mounjaro to 15mg for optimal treatment of co-existing obesity.   Diabetes will be reassessed in 3 months

## 2024-06-27 ENCOUNTER — PATIENT MESSAGE (OUTPATIENT)
Dept: BARIATRICS/WEIGHT MGMT | Facility: CLINIC | Age: 49
End: 2024-06-27
Payer: COMMERCIAL

## 2024-06-27 DIAGNOSIS — E66.09 CLASS 1 OBESITY DUE TO EXCESS CALORIES WITH SERIOUS COMORBIDITY AND BODY MASS INDEX (BMI) OF 33.0 TO 33.9 IN ADULT: Primary | ICD-10-CM

## 2024-06-27 RX ORDER — SEMAGLUTIDE 2.4 MG/.75ML
2.4 INJECTION, SOLUTION SUBCUTANEOUS WEEKLY
Qty: 3 ML | Refills: 2 | Status: SHIPPED | OUTPATIENT
Start: 2024-06-27

## 2024-06-27 NOTE — TELEPHONE ENCOUNTER
From: Eloise Delatorre  To: Katy Dobson  Sent: 6/27/2024 12:48 PM EDT  Subject: Wegovy     Can you give me refills on the wegovy just in case the mounjaro doesn’t come in please?

## 2024-07-22 ENCOUNTER — PRIOR AUTHORIZATION (OUTPATIENT)
Dept: BARIATRICS/WEIGHT MGMT | Facility: CLINIC | Age: 49
End: 2024-07-22
Payer: COMMERCIAL

## 2024-07-22 NOTE — TELEPHONE ENCOUNTER
Eloise Delatorre (Key: [id number])  Rx #: [id number]  Wegovy 2.4MG/0.75ML auto-injectors  Form  [facility] Electronic PA Form (2017 N[employer])

## 2024-07-25 ENCOUNTER — PRIOR AUTHORIZATION (OUTPATIENT)
Dept: BARIATRICS/WEIGHT MGMT | Facility: CLINIC | Age: 49
End: 2024-07-25
Payer: COMMERCIAL

## 2024-07-25 NOTE — TELEPHONE ENCOUNTER
Looking back through the questions that were answered, they were answered incorrectly. Will resubmit.

## 2024-07-25 NOTE — TELEPHONE ENCOUNTER
Why your request was denied:  Your plan only covers this drug when you experience benefits from taking the drug. We have denied your request because you did not have good outcomes from the drug.

## 2024-09-16 ENCOUNTER — OFFICE VISIT (OUTPATIENT)
Dept: BARIATRICS/WEIGHT MGMT | Facility: CLINIC | Age: 49
End: 2024-09-16
Payer: COMMERCIAL

## 2024-09-16 VITALS
DIASTOLIC BLOOD PRESSURE: 72 MMHG | WEIGHT: 200.4 LBS | BODY MASS INDEX: 34.21 KG/M2 | HEIGHT: 64 IN | HEART RATE: 83 BPM | SYSTOLIC BLOOD PRESSURE: 124 MMHG

## 2024-09-16 DIAGNOSIS — E66.09 CLASS 1 OBESITY DUE TO EXCESS CALORIES WITH SERIOUS COMORBIDITY AND BODY MASS INDEX (BMI) OF 34.0 TO 34.9 IN ADULT: Primary | ICD-10-CM

## 2024-09-16 DIAGNOSIS — E11.9 TYPE 2 DIABETES MELLITUS WITHOUT COMPLICATION, WITHOUT LONG-TERM CURRENT USE OF INSULIN: ICD-10-CM

## 2024-09-16 PROBLEM — K58.9 IRRITABLE BOWEL SYNDROME: Status: ACTIVE | Noted: 2024-09-16

## 2024-09-16 PROBLEM — Z95.818 IMPLANTABLE LOOP RECORDER PRESENT: Status: ACTIVE | Noted: 2023-07-20

## 2024-09-16 PROBLEM — K75.81 NONALCOHOLIC STEATOHEPATITIS (NASH): Status: ACTIVE | Noted: 2020-02-25

## 2024-09-16 PROBLEM — M51.36 DEGENERATIVE DISC DISEASE, LUMBAR: Status: ACTIVE | Noted: 2019-05-24

## 2024-09-16 PROBLEM — I31.39 PERICARDIAL EFFUSION: Status: ACTIVE | Noted: 2024-02-29

## 2024-09-16 PROBLEM — M51.369 DEGENERATIVE DISC DISEASE, LUMBAR: Status: ACTIVE | Noted: 2019-05-24

## 2024-09-16 PROCEDURE — 96372 THER/PROPH/DIAG INJ SC/IM: CPT | Performed by: NURSE PRACTITIONER

## 2024-09-16 PROCEDURE — 99214 OFFICE O/P EST MOD 30 MIN: CPT | Performed by: NURSE PRACTITIONER

## 2024-09-16 RX ORDER — TOPIRAMATE 25 MG/1
TABLET, FILM COATED ORAL
Qty: 60 TABLET | Refills: 2 | Status: SHIPPED | OUTPATIENT
Start: 2024-09-16

## 2024-09-16 RX ADMIN — CYANOCOBALAMIN 1000 MCG: 1000 INJECTION, SOLUTION INTRAMUSCULAR; SUBCUTANEOUS at 08:46

## 2024-11-26 DIAGNOSIS — E11.9 TYPE 2 DIABETES MELLITUS WITHOUT COMPLICATION, WITHOUT LONG-TERM CURRENT USE OF INSULIN: ICD-10-CM

## 2024-12-16 ENCOUNTER — OFFICE VISIT (OUTPATIENT)
Dept: BARIATRICS/WEIGHT MGMT | Facility: CLINIC | Age: 49
End: 2024-12-16
Payer: MEDICARE

## 2024-12-16 VITALS
RESPIRATION RATE: 18 BRPM | BODY MASS INDEX: 34.66 KG/M2 | SYSTOLIC BLOOD PRESSURE: 122 MMHG | HEART RATE: 74 BPM | DIASTOLIC BLOOD PRESSURE: 68 MMHG | HEIGHT: 64 IN | WEIGHT: 203 LBS | OXYGEN SATURATION: 98 %

## 2024-12-16 DIAGNOSIS — F41.9 ANXIETY: ICD-10-CM

## 2024-12-16 DIAGNOSIS — R53.83 FATIGUE, UNSPECIFIED TYPE: ICD-10-CM

## 2024-12-16 DIAGNOSIS — E66.09 CLASS 1 OBESITY DUE TO EXCESS CALORIES WITH SERIOUS COMORBIDITY AND BODY MASS INDEX (BMI) OF 34.0 TO 34.9 IN ADULT: Primary | ICD-10-CM

## 2024-12-16 DIAGNOSIS — E66.811 CLASS 1 OBESITY DUE TO EXCESS CALORIES WITH SERIOUS COMORBIDITY AND BODY MASS INDEX (BMI) OF 34.0 TO 34.9 IN ADULT: Primary | ICD-10-CM

## 2024-12-16 PROCEDURE — 1160F RVW MEDS BY RX/DR IN RCRD: CPT | Performed by: NURSE PRACTITIONER

## 2024-12-16 PROCEDURE — 1159F MED LIST DOCD IN RCRD: CPT | Performed by: NURSE PRACTITIONER

## 2024-12-16 PROCEDURE — 99214 OFFICE O/P EST MOD 30 MIN: CPT | Performed by: NURSE PRACTITIONER

## 2024-12-16 PROCEDURE — 3078F DIAST BP <80 MM HG: CPT | Performed by: NURSE PRACTITIONER

## 2024-12-16 PROCEDURE — 3074F SYST BP LT 130 MM HG: CPT | Performed by: NURSE PRACTITIONER

## 2024-12-16 PROCEDURE — 96372 THER/PROPH/DIAG INJ SC/IM: CPT | Performed by: NURSE PRACTITIONER

## 2024-12-16 RX ORDER — MELOXICAM 7.5 MG/1
7.5 TABLET ORAL DAILY
COMMUNITY
Start: 2024-12-04

## 2024-12-16 RX ORDER — CYANOCOBALAMIN 1000 UG/ML
1000 INJECTION, SOLUTION INTRAMUSCULAR; SUBCUTANEOUS ONCE
Status: COMPLETED | OUTPATIENT
Start: 2024-12-16 | End: 2024-12-16

## 2024-12-16 RX ADMIN — CYANOCOBALAMIN 1000 MCG: 1000 INJECTION, SOLUTION INTRAMUSCULAR; SUBCUTANEOUS at 09:15

## 2024-12-16 NOTE — PROGRESS NOTES
[facility]  [address]  [place] [address]     Office Note      Date: 2024  Patient Name: Eloise Delatorre  MRN: [id number]  : 1975  Subjective  Subjective     Chief Complaint  Obesity Management follow-up          Eloise Delatorre presents to [facility] for obesity management. s/p LSG/liver bx (steatohepatitis) 20 w/ Dr. Guzman. Presurgery weight: 281 pounds (~250lb per patient).  Patient is unsatisfied with weight loss progress. Appetite is moderately controlled. Reports no side effects of prescribed medications today. Restarted topamax and it was no help, discontinued. Has now started gabapentin for sleep/back pain. Got a steroid shot after  and that helped temporarily. Thinking about starting turmeric. The patient is taking multivitamin and is taking fish oil. The patient is not using a food journal. She is not checking her blood glucose. Has been eating more potatoes, knows she is stress eating.      24 hour recall:  Breakfast: starbucks coffee w/ oatmilk, sweet&low, 2 egg white bites w/ red pepper  Lunch: roast beef, carrots, potatoes, butter, turkey laughlin on crescent roll  Dinner: 1 small baked potato w/ onion and slice of american cheese  Snack: 1/2 teofilo chip cookie, pepsi     The patient is not exercising due to limitations.    Review of Systems   Constitutional:  Negative for appetite change and fatigue.   Eyes:  Negative for visual disturbance.   Cardiovascular:  Negative for chest pain and palpitations.   Gastrointestinal:  Negative for constipation and indigestion.   Neurological:  Negative for light-headedness.         Current Outpatient Medications:     atorvastatin (LIPITOR) 40 MG tablet, Take 1 tablet by mouth Daily., Disp: , Rfl:     fenofibric acid (TRILIPIX) 135 MG capsule delayed-release delayed release capsule, Take 1 capsule by mouth Daily., Disp: , Rfl:     ferrous sulfate 324 (65 Fe) MG  "tablet delayed-release EC tablet, Take 1 tablet by mouth 2 (Two) Times a Day With Meals., Disp: , Rfl:     lisinopril (PRINIVIL,ZESTRIL) 10 MG tablet, Take 1 tablet by mouth Daily., Disp: , Rfl:     metFORMIN ER (GLUCOPHAGE-XR) 500 MG 24 hr tablet, TAKE 3 TABLETS BY MOUTH DAILY WITH FOOD., Disp: 270 tablet, Rfl: 0    Multiple Vitamins-Minerals (CENTRUM ULTRA WOMENS) tablet, Take 1 tablet by mouth Daily. patch, Disp: , Rfl:     omeprazole (priLOSEC) 40 MG capsule, Take 1 capsule by mouth Daily., Disp: 90 capsule, Rfl: 0    Tirzepatide 15 MG/0.5ML solution auto-injector, Inject 15 mg under the skin into the appropriate area as directed 1 (One) Time Per Week., Disp: 6 mL, Rfl: 0    meloxicam (MOBIC) 7.5 MG tablet, Take 1 tablet by mouth Daily. (Patient not taking: Reported on 12/16/2024), Disp: , Rfl:   No current facility-administered medications for this visit.    Objective   Start weight: 227 pounds.    Total Loss lb/%Loss of beginning body weight (BBW): -24lb/-10.57%  Change in weight since last visit: +2.4    Body mass index is 34.84 kg/m².   Body composition analysis completed and showed:   Body Fat %: 54.5%    Measurements (in inches)  Waist Circumference: 48      Vital Signs:   /68   Pulse 74   Resp 18   Ht 162.6 cm (64\")   Wt 92.1 kg (203 lb)   SpO2 98%   BMI 34.84 kg/m²     No LMP recorded. Patient has had a hysterectomy.    Physical Exam   General appears stated age and normal appearance   HEENT PERRLA, EOM intact, and conjunctivae normal   Chest/lungs Normal rate, Regular rhythm, and Breathing is unlabored   Extremities without edema   Neuro Good historian and No focal deficit   Skin Warm, dry, intact   Psych normal behavior, normal thought content, and normal concentration     Result Review :                Assessment / Plan        Diagnoses and all orders for this visit:    1. Class 1 obesity due to excess calories with serious comorbidity and body mass index (BMI) of 34.0 to 34.9 in adult " (Primary)  Assessment & Plan:  Patient's (Body mass index is 34.84 kg/m².) indicates that they are obese (BMI >30) with health conditions that include obstructive sleep apnea, hypertension, diabetes mellitus, dyslipidemias, and GERD . Weight is worsening. BMI  is above average; BMI management plan is completed. We discussed low calorie, low carb based diet program, portion control, increasing exercise, pharmacologic options including mounjaro, and an sally-based approach such as appMobi Pal or Lose It.     I have instructed the patient to continue with pursuit of medical weight loss as a part of this program. Patient does meet criteria for use of anorectics at this time as BMI >30  and is not at treatment goal.     The current plan for this month includes:   - Continue to prioritize protein, fiber, and hydration.   - Continue current meds. Stopped topamax, no help with sleep or appetite.   - Reviewed gabapentin can increase appetite, be mindful of sweets. Cut out sodas.   - Restart food journal (noom and/or MFP)  - Treatment goal 180lb        2. Anxiety  Assessment & Plan:  Worsening due to parents declining health and daughter being home for 6 weeks from college. Knows she is stress eating. Incorporate non-food stress relief strategies.      3. Fatigue, unspecified type  -     cyanocobalamin injection 1,000 mcg          Follow Up   Return in about 3 months (around 3/16/2025) for Next scheduled follow up.  Patient was given instructions and counseling regarding her condition or for health maintenance advice. Please see specific information pulled into the AVS if appropriate.     Katy Dobson, APRN  12/16/2024

## 2024-12-16 NOTE — ASSESSMENT & PLAN NOTE
Worsening due to parents declining health and daughter being home for 6 weeks from college. Knows she is stress eating. Incorporate non-food stress relief strategies.

## 2024-12-16 NOTE — ASSESSMENT & PLAN NOTE
Patient's (Body mass index is 34.84 kg/m².) indicates that they are obese (BMI >30) with health conditions that include obstructive sleep apnea, hypertension, diabetes mellitus, dyslipidemias, and GERD . Weight is worsening. BMI  is above average; BMI management plan is completed. We discussed low calorie, low carb based diet program, portion control, increasing exercise, pharmacologic options including mounjaro, and an sally-based approach such as Gro[employer] Pal or Lose It.     I have instructed the patient to continue with pursuit of medical weight loss as a part of this program. Patient does meet criteria for use of anorectics at this time as BMI >30  and is not at treatment goal.     The current plan for this month includes:   - Continue to prioritize protein, fiber, and hydration.   - Continue current meds. Stopped topamax, no help with sleep or appetite.   - Reviewed gabapentin can increase appetite, be mindful of sweets. Cut out sodas.   - Restart food journal (noom and/or MFP)  - Treatment goal 180lb

## 2025-03-17 ENCOUNTER — OFFICE VISIT (OUTPATIENT)
Dept: BARIATRICS/WEIGHT MGMT | Facility: CLINIC | Age: 50
End: 2025-03-17
Payer: COMMERCIAL

## 2025-03-17 VITALS
BODY MASS INDEX: 34.57 KG/M2 | DIASTOLIC BLOOD PRESSURE: 71 MMHG | HEIGHT: 64 IN | WEIGHT: 202.5 LBS | HEART RATE: 66 BPM | SYSTOLIC BLOOD PRESSURE: 124 MMHG

## 2025-03-17 DIAGNOSIS — E66.811 CLASS 1 OBESITY DUE TO EXCESS CALORIES WITH SERIOUS COMORBIDITY AND BODY MASS INDEX (BMI) OF 34.0 TO 34.9 IN ADULT: Primary | ICD-10-CM

## 2025-03-17 DIAGNOSIS — R53.83 FATIGUE, UNSPECIFIED TYPE: ICD-10-CM

## 2025-03-17 DIAGNOSIS — E11.9 TYPE 2 DIABETES MELLITUS WITHOUT COMPLICATION, WITHOUT LONG-TERM CURRENT USE OF INSULIN: ICD-10-CM

## 2025-03-17 DIAGNOSIS — E66.09 CLASS 1 OBESITY DUE TO EXCESS CALORIES WITH SERIOUS COMORBIDITY AND BODY MASS INDEX (BMI) OF 34.0 TO 34.9 IN ADULT: Primary | ICD-10-CM

## 2025-03-17 PROCEDURE — 99214 OFFICE O/P EST MOD 30 MIN: CPT | Performed by: NURSE PRACTITIONER

## 2025-03-17 PROCEDURE — 96372 THER/PROPH/DIAG INJ SC/IM: CPT | Performed by: NURSE PRACTITIONER

## 2025-03-17 RX ORDER — CYANOCOBALAMIN 1000 UG/ML
1000 INJECTION, SOLUTION INTRAMUSCULAR; SUBCUTANEOUS
Status: SHIPPED | OUTPATIENT
Start: 2025-03-17

## 2025-03-17 RX ORDER — METFORMIN HYDROCHLORIDE 500 MG/1
TABLET, EXTENDED RELEASE ORAL
Qty: 270 TABLET | Refills: 0 | Status: SHIPPED | OUTPATIENT
Start: 2025-03-17

## 2025-03-17 RX ADMIN — CYANOCOBALAMIN 1000 MCG: 1000 INJECTION, SOLUTION INTRAMUSCULAR; SUBCUTANEOUS at 08:57

## 2025-03-17 NOTE — PROGRESS NOTES
[facility]  [address]  [place] [address]     Office Note      Date: 2025  Patient Name: Eloise Delatorre  MRN: [id number]  : 1975  Subjective  Subjective     Chief Complaint  Obesity Management follow-up          Eloise Delatorre presents to [facility] for obesity management. s/p LSG/liver bx (steatohepatitis) 20 w/ Dr. Guzman  Presurgery weight: 281 pounds (~250lb per patient)  Patient is unsatisfied with weight loss progress. Appetite is moderately controlled. Reports no side effects of prescribed medications today. The patient is taking multivitamin and is taking fish oil.  The patient is not using a food journal.  States she has been very busy between her daughter and caring for her father who lives with her.     She has not been able to exercise do to back pain and previous back surgery as well as her large abdominal mass that prevents her from doing many activities. She has a consultation with cosmetic weight loss (cryotherapy, liposuction, etc.) and needs to reschedule her appt with  plastic surgery since she missed it due to the bad weather.     She does not journal and does not really keep track of her nutrition goals. Discussed options to increase protein, how to calculate carbs, the difference between total carbs and net carbs.     24 hour recall:  Breakfast: 1 egg white bite, Espresso (Starbucks)  Lunch: 3/4 c tuna, 1 tsp sheikh  Dinner: , tortilla, cheddar cheese, w/ranch  Snack: 1 alvin's kiss, 1 boiled egg, dill pickle spear, 2 tbsp homemade coleslaw, fried cabbage and onions in EVOO  Water Intake: 100 oz  Other beverages: zero calorie lemonade, Coke occasionally   Alcohol: none    The patient is exercising with a FITT score of:    Frequency Intensity Time Strength Training   []   0, none []   0 []   0 []   0   [x]   1 (1-2x/week) [x]   1 (light) []   1 (<10 min) [x]   1 (1x/week)   []   2  (3-5x/week) []   2 (moderate) [x]   2 (10-20 min) []   2 (2x/week)   []   3 (daily) []   3 (moderately hard)  []   4 (very hard) []   3 (20-30 min)  []   4 (>30 min) []   3 (3-4x/week)               Review of Systems   Constitutional:  Positive for unexpected weight gain. Negative for appetite change and fatigue.   Eyes:  Negative for visual disturbance.   Cardiovascular:  Negative for chest pain and palpitations.   Gastrointestinal:  Negative for constipation and indigestion.   Neurological:  Negative for light-headedness.   All other systems reviewed and are negative.        Current Outpatient Medications:     atorvastatin (LIPITOR) 40 MG tablet, Take 1 tablet by mouth Daily., Disp: , Rfl:     fenofibric acid (TRILIPIX) 135 MG capsule delayed-release delayed release capsule, Take 1 capsule by mouth Daily., Disp: , Rfl:     ferrous sulfate 324 (65 Fe) MG tablet delayed-release EC tablet, Take 1 tablet by mouth 2 (Two) Times a Day With Meals., Disp: , Rfl:     lisinopril (PRINIVIL,ZESTRIL) 10 MG tablet, Take 1 tablet by mouth Daily., Disp: , Rfl:     metFORMIN ER (GLUCOPHAGE-XR) 500 MG 24 hr tablet, TAKE 3 TABLETS BY MOUTH DAILY WITH FOOD., Disp: 270 tablet, Rfl: 0    Multiple Vitamins-Minerals (CENTRUM ULTRA WOMENS) tablet, Take 1 tablet by mouth Daily. patch, Disp: , Rfl:     omeprazole (priLOSEC) 40 MG capsule, Take 1 capsule by mouth Daily., Disp: 90 capsule, Rfl: 0    Tirzepatide 15 MG/0.5ML solution auto-injector, Inject 15 mg under the skin into the appropriate area as directed 1 (One) Time Per Week., Disp: 6 mL, Rfl: 0    Current Facility-Administered Medications:     cyanocobalamin injection 1,000 mcg, 1,000 mcg, Intramuscular, Q28 Days, Katy Dobson APRN, 1,000 mcg at 03/17/25 0857    Objective   Start weight: 227 pounds.    Total weight loss: -24.5 pounds/-10.8 %  Change in weight since last visit: -0.5 lb    Body mass index is 34.76 kg/m².   Body composition analysis completed and showed:   Body Fat  "%: 43.9    Measurements (in inches)  Waist Circumference: 48      Vital Signs:   /71 (BP Location: Left arm, Patient Position: Sitting, Cuff Size: Adult)   Pulse 66   Ht 162.6 cm (64\")   Wt 91.9 kg (202 lb 8 oz)   BMI 34.76 kg/m²     No LMP recorded. Patient has had a hysterectomy.    Physical Exam   General appears stated age and normal appearance   HEENT PERRLA, EOM intact, and conjunctivae normal   Chest/lungs Normal rate, Regular rhythm, and Breathing is unlabored   Extremities without edema   Neuro Good historian and No focal deficit   Skin Warm, dry, intact   Psych normal behavior, normal thought content, and normal concentration     Result Review :                Assessment / Plan        Diagnoses and all orders for this visit:    1. Class 1 obesity due to excess calories with serious comorbidity and body mass index (BMI) of 34.0 to 34.9 in adult (Primary)  Assessment & Plan:  Patient's (Body mass index is 34.76 kg/m².) indicates that they are obese (BMI >30) with health conditions that include hypertension, diabetes mellitus, dyslipidemias, and GERD . Weight is unchanged. BMI  is above average; BMI management plan is completed. We discussed low calorie, low carb based diet program, portion control, increasing exercise, pharmacologic options including mounjaro, metformin, and an sally-based approach such as Shopping Mail Pal or Lose It.     I have instructed the patient to continue with pursuit of medical weight loss as a part of this program. Patient does meet criteria for use of anorectics at this time as BMI >30  and is not at treatment goal.     The current plan for this month includes:   - Continue to work on lifestyle behavioral changes  - Prioritize protein, fiber, and hydration- reviewed macronutrient goals.   - Cont mounjaro 15mg.   - Has consultation with cosmetic services and [facility] [employer], advised to ask questions about risk and duration of benefits for cosmetic procedures.  Her goal is to " decrease her midsection so she can better tolerated physical activity.   - Treatment goal 180lb.         2. Type 2 diabetes mellitus without complication, without long-term current use of insulin  Overview:  never on insulin, does not check FSBS,   Recently stopped metformin due to recall and started glimepiride.  Has been on victoza at least 6 mos.  Had recent hypoglycemia with lab check, asymptomatic, stopped glimepiride.  10/19/2021: switch to saxenda and titrate up to 3.0mg.  11/21: A1c 6.0  07/2022 A1c 5.9 on wegovy 1.7mg    Assessment & Plan:  Diabetes is stable.   Continue current treatment regimen.  Diabetes will be reassessed in 3 months    Orders:  -     Tirzepatide 15 MG/0.5ML solution auto-injector; Inject 15 mg under the skin into the appropriate area as directed 1 (One) Time Per Week.  Dispense: 6 mL; Refill: 0  -     metFORMIN ER (GLUCOPHAGE-XR) 500 MG 24 hr tablet; TAKE 3 TABLETS BY MOUTH DAILY WITH FOOD.  Dispense: 270 tablet; Refill: 0    3. Fatigue, unspecified type  -     cyanocobalamin injection 1,000 mcg          Follow Up   Return in about 3 months (around 6/17/2025) for Next scheduled follow up.  Patient was given instructions and counseling regarding her condition or for health maintenance advice. Please see specific information pulled into the AVS if appropriate.     Katy Dobson, GIANA  03/17/2025

## 2025-03-17 NOTE — ASSESSMENT & PLAN NOTE
Patient's (Body mass index is 34.76 kg/m².) indicates that they are obese (BMI >30) with health conditions that include hypertension, diabetes mellitus, dyslipidemias, and GERD . Weight is unchanged. BMI  is above average; BMI management plan is completed. We discussed low calorie, low carb based diet program, portion control, increasing exercise, pharmacologic options including mounjaro, metformin, and an sally-based approach such as [employer] Pal or Lose It.     I have instructed the patient to continue with pursuit of medical weight loss as a part of this program. Patient does meet criteria for use of anorectics at this time as BMI >30  and is not at treatment goal.     The current plan for this month includes:   - Continue to work on lifestyle behavioral changes  - Prioritize protein, fiber, and hydration- reviewed macronutrient goals.   - Cont mounjaro 15mg.   - Has consultation with cosmetic services and [facility] [employer], advised to ask questions about risk and duration of benefits for cosmetic procedures.  Her goal is to decrease her midsection so she can better tolerated physical activity.   - Treatment goal 180lb.

## 2025-03-18 ENCOUNTER — TELEPHONE (OUTPATIENT)
Dept: BARIATRICS/WEIGHT MGMT | Facility: CLINIC | Age: 50
End: 2025-03-18
Payer: COMMERCIAL

## 2025-03-18 DIAGNOSIS — E11.9 TYPE 2 DIABETES MELLITUS WITHOUT COMPLICATION, WITHOUT LONG-TERM CURRENT USE OF INSULIN: ICD-10-CM

## 2025-03-18 NOTE — TELEPHONE ENCOUNTER
"Epic no longer has a brand associated with tirzepatide in the order sets, I resent with \"mounjaro\" in the comments. She has been on this brand for almost a year so no change.   "

## 2025-03-18 NOTE — TELEPHONE ENCOUNTER
Miroslava from [facility] in [facility] called.  They received a prescription for patient that just says Tirzepitide and they need to know whether they should be dispensing Mounjaro or Zepbound.  Please resend prescription.  Thank you.

## 2025-04-10 ENCOUNTER — OFFICE VISIT (OUTPATIENT)
Dept: BARIATRICS/WEIGHT MGMT | Facility: CLINIC | Age: 50
End: 2025-04-10
Payer: MEDICARE

## 2025-04-10 VITALS
SYSTOLIC BLOOD PRESSURE: 136 MMHG | HEART RATE: 92 BPM | OXYGEN SATURATION: 93 % | WEIGHT: 200.6 LBS | HEIGHT: 64 IN | RESPIRATION RATE: 22 BRPM | BODY MASS INDEX: 34.25 KG/M2 | TEMPERATURE: 96.8 F | DIASTOLIC BLOOD PRESSURE: 98 MMHG

## 2025-04-10 DIAGNOSIS — E66.811 OBESITY, CLASS I, BMI 30-34.9: Primary | ICD-10-CM

## 2025-04-10 DIAGNOSIS — E55.9 VITAMIN D DEFICIENCY: ICD-10-CM

## 2025-04-10 DIAGNOSIS — R53.83 FATIGUE, UNSPECIFIED TYPE: ICD-10-CM

## 2025-04-10 DIAGNOSIS — R79.0 ABNORMAL BLOOD LEVEL OF IRON: ICD-10-CM

## 2025-04-10 DIAGNOSIS — K91.2 POSTGASTRECTOMY MALABSORPTION: ICD-10-CM

## 2025-04-10 DIAGNOSIS — Z90.3 POSTGASTRECTOMY MALABSORPTION: ICD-10-CM

## 2025-04-10 PROCEDURE — 1159F MED LIST DOCD IN RCRD: CPT | Performed by: NURSE PRACTITIONER

## 2025-04-10 PROCEDURE — 3075F SYST BP GE 130 - 139MM HG: CPT | Performed by: NURSE PRACTITIONER

## 2025-04-10 PROCEDURE — 1160F RVW MEDS BY RX/DR IN RCRD: CPT | Performed by: NURSE PRACTITIONER

## 2025-04-10 PROCEDURE — 99214 OFFICE O/P EST MOD 30 MIN: CPT | Performed by: NURSE PRACTITIONER

## 2025-04-10 PROCEDURE — 3080F DIAST BP >= 90 MM HG: CPT | Performed by: NURSE PRACTITIONER

## 2025-04-10 RX ORDER — MINOCYCLINE HYDROCHLORIDE 100 MG/1
100 CAPSULE ORAL 2 TIMES DAILY
COMMUNITY

## 2025-04-10 NOTE — PROGRESS NOTES
[facility]  [address]  [address]  [facility] [id number] [phone]        Patient Name:  Eloise Delatorre  :  1975      Date of Visit: 04/10/2025      Reason for Visit:   Annual Eval - 5 years postop        HPI: Eloise Delatorre is a 49 y.o. female s/p LSG/liver bx (steatohepatitis) 20 w/ Dr. Guzman.     Following w/ [facility]- on mounjaro 15 mg.    Having increase stress- which has been causing her to eat more.    GBUS 24  Impression:  Hepatic steatosis    Issues w/ nausea at LOV resolved.    Denies dysphagia, reflux, nausea, vomiting, abdominal pain, diarrhea, and constipation.  Getting ??g prot/day. Does not track, but does not get enough protein. Snacks throughout the day.  Drinking 64 fluid oz/day.  On Omeprazole .  Physical activity: walking some.    Had appt with [facility] earlier today- they told her she would need to lose 20-50 lbs for surgery.    Labs 3/28/24- low iron, low ferritin, low hgb. IV iron infusions (venofer) x 2. Takes MVI, iron, vit D, occ B12 injections, fish oil.     Presurgery weight: 281 pounds.  Today's weight is 91 kg (200 lb 9.6 oz) pounds, today's  Body mass index is 34.43 kg/m²., and weight loss since surgery is 81 pounds.      Past Medical History:   Diagnosis Date    Anemia     Anxiety     Arthritis     Asthma     allergy induced, prn inhalers    Chronic back pain     Depression     Dyspepsia     w/ postprandial nausea + abd.pain    Dyspnea on exertion     Fatigue     Fatty liver     Fibromyalgia     Frequent UTI     GERD (gastroesophageal reflux disease)     Hyperlipidemia     Hypertension     Hypertriglyceridemia     Iron deficiency     Morbid obesity     Postmenopausal HRT (hormone replacement therapy) 2013    Psoriasis     Sleep apnea     Type 2 diabetes mellitus     Vitamin D deficiency     Wears glasses      Past Surgical History:   Procedure Laterality Date    BACK SURGERY      L4-L5    BLADDER SUSPENSION       during hysterectomy    COLONOSCOPY  2015    unremarkable, but w/ hx polyps    DIAGNOSTIC LAPAROSCOPY  1994    x 8 ([id number]) for endometriosis    ENDOSCOPY N/A 02/25/2020    Procedure: ESOPHAGOGASTRODUODENOSCOPY;  Surgeon: Lila Guzman MD;  Location:  [facility];  Service: Bariatric;  Laterality: N/A;    GASTRIC SLEEVE LAPAROSCOPIC N/A 02/25/2020    Procedure: GASTRIC SLEEVE LAPAROSCOPIC;  Surgeon: Lila Guzman MD;  Location:  [facility];  Service: Bariatric;  Laterality: N/A;    LIVER BIOPSY N/A 02/25/2020    Procedure: LIVER BIOPSY LAPAROSCOPIC;  Surgeon: Lila Guzman MD;  Location:  [facility];  Service: Bariatric;  Laterality: N/A;    TOTAL LAPAROSCOPIC HYSTERECTOMY  2009    for endometriosis    WISDOM TOOTH EXTRACTION  1992     Outpatient Medications Marked as Taking for the 4/10/25 encounter (Office Visit) with Cheryl Devine APRN   Medication Sig Dispense Refill    atorvastatin (LIPITOR) 40 MG tablet Take 1 tablet by mouth Daily.      fenofibric acid (TRILIPIX) 135 MG capsule delayed-release delayed release capsule Take 1 capsule by mouth Daily.      ferrous sulfate 324 (65 Fe) MG tablet delayed-release EC tablet Take 1 tablet by mouth 2 (Two) Times a Day With Meals.      lisinopril (PRINIVIL,ZESTRIL) 10 MG tablet Take 1 tablet by mouth Daily.      metFORMIN ER (GLUCOPHAGE-XR) 500 MG 24 hr tablet TAKE 3 TABLETS BY MOUTH DAILY WITH FOOD. 270 tablet 0    minocycline (MINOCIN,DYNACIN) 100 MG capsule Take 1 capsule by mouth 2 (Two) Times a Day.      Multiple Vitamins-Minerals (CENTRUM ULTRA WOMENS) tablet Take 1 tablet by mouth Daily. patch      omeprazole (priLOSEC) 40 MG capsule Take 1 capsule by mouth Daily. 90 capsule 0    Tirzepatide 15 MG/0.5ML solution auto-injector Inject 15 mg under the skin into the appropriate area as directed 1 (One) Time Per Week. 6 mL 0     Current Facility-Administered Medications for the 4/10/25 encounter (Office Visit) with Cheryl Devine APRN  "  Medication Dose Route Frequency Provider Last Rate Last Admin    cyanocobalamin injection 1,000 mcg  1,000 mcg Intramuscular Q28 Days Katy Dobson APRN   1,000 mcg at 25 0857       No Known Allergies    Social History     Socioeconomic History    Marital status:    Tobacco Use    Smoking status: Former     Current packs/day: 0.00     Average packs/day: 3.0 packs/day for 20.0 years (60.0 ttl pk-yrs)     Types: Cigarettes     Start date: 1989     Quit date: 2009     Years since quittin.2     Passive exposure: Past    Smokeless tobacco: Never   Vaping Use    Vaping status: Never Used   Substance and Sexual Activity    Alcohol use: No    Drug use: Never    Sexual activity: Not Currently     Partners: Male     Birth control/protection: Abstinence, None     Comment: Hysterectomy     Social History     Social History Narrative    Living in [place], [place] ([facility]) w/  and daughter.  On disability since 2019 for back pain.  Formerly worked w/ [employer] x 17 years.       /98 (BP Location: Left arm, Patient Position: Sitting)   Pulse 92   Temp 96.8 °F (36 °C)   Resp 22   Ht 162.6 cm (64\")   Wt 91 kg (200 lb 9.6 oz)   SpO2 93%   BMI 34.43 kg/m²     Physical Exam  Constitutional:       Appearance: She is well-developed.   Cardiovascular:      Rate and Rhythm: Normal rate.   Pulmonary:      Effort: Pulmonary effort is normal.   Musculoskeletal:         General: Normal range of motion.   Neurological:      Mental Status: She is alert.   Psychiatric:         Thought Content: Thought content normal.         Judgment: Judgment normal.           Assessment:  5 years s/p LSG/liver bx (steatohepatitis) 20 w/ Dr. Guzman.     ICD-10-CM ICD-9-CM   1. Obesity, Class I, BMI 30-34.9  E66.811 278.00   2. Postgastrectomy malabsorption  K91.2 579.3    Z90.3    3. Vitamin D deficiency  E55.9 268.9   4. Abnormal blood level of iron  R79.0 790.6   5. Fatigue, unspecified type  R53.83 780.79 "       BMI is >= 30 and <35. (Class 1 Obesity). The following options were offered after discussion;: see plan        Plan:   Continue to follow w/ [facility]. Recommended protein 100g/day - start tracking intake again.  Increase routine exercise/activity as able.  Bariatric labs ordered.  Continue current vitamin regimen, adjustments pending lab results.  Call w/ problems/concerns.     The patient was instructed to follow up in 1 year, sooner if needed.    I spent 30 minutes caring for Eloise on this date of service. This time includes time spent by me in the following activities: preparing for the visit, reviewing tests, obtaining and/or reviewing a separately obtained history, performing a medically appropriate examination and/or evaluation, counseling and educating the patient/family/caregiver, ordering medications, tests, or procedures, and documenting information in the medical record.        Cheryl Devine, APRN

## 2025-04-14 ENCOUNTER — RESULTS FOLLOW-UP (OUTPATIENT)
Dept: BARIATRICS/WEIGHT MGMT | Facility: CLINIC | Age: 50
End: 2025-04-14
Payer: COMMERCIAL

## 2025-04-14 LAB
25(OH)D3+25(OH)D2 SERPL-MCNC: 33.8 NG/ML (ref 30–100)
ALBUMIN SERPL-MCNC: 4.2 G/DL (ref 3.9–4.9)
ALP SERPL-CCNC: 104 IU/L (ref 44–121)
ALT SERPL-CCNC: 30 IU/L (ref 0–32)
AST SERPL-CCNC: 30 IU/L (ref 0–40)
BASOPHILS # BLD AUTO: 0.1 X10E3/UL (ref 0–0.2)
BASOPHILS NFR BLD AUTO: 1 %
BILIRUB SERPL-MCNC: 0.3 MG/DL (ref 0–1.2)
BUN SERPL-MCNC: 14 MG/DL (ref 6–24)
BUN/CREAT SERPL: 19 (ref 9–23)
CALCIUM SERPL-MCNC: 9.6 MG/DL (ref 8.7–10.2)
CHLORIDE SERPL-SCNC: 107 MMOL/L (ref 96–106)
CO2 SERPL-SCNC: 21 MMOL/L (ref 20–29)
CREAT SERPL-MCNC: 0.75 MG/DL (ref 0.57–1)
EGFRCR SERPLBLD CKD-EPI 2021: 97 ML/MIN/1.73
EOSINOPHIL # BLD AUTO: 0.3 X10E3/UL (ref 0–0.4)
EOSINOPHIL NFR BLD AUTO: 4 %
ERYTHROCYTE [DISTWIDTH] IN BLOOD BY AUTOMATED COUNT: 14.9 % (ref 11.7–15.4)
FERRITIN SERPL-MCNC: 22 NG/ML (ref 15–150)
FOLATE SERPL-MCNC: >20 NG/ML
GLOBULIN SER CALC-MCNC: 2.3 G/DL (ref 1.5–4.5)
GLUCOSE SERPL-MCNC: 77 MG/DL (ref 70–99)
HCT VFR BLD AUTO: 45.1 % (ref 34–46.6)
HGB BLD-MCNC: 13.8 G/DL (ref 11.1–15.9)
IMM GRANULOCYTES # BLD AUTO: 0 X10E3/UL (ref 0–0.1)
IMM GRANULOCYTES NFR BLD AUTO: 0 %
IRON SERPL-MCNC: 36 UG/DL (ref 27–159)
LYMPHOCYTES # BLD AUTO: 2.3 X10E3/UL (ref 0.7–3.1)
LYMPHOCYTES NFR BLD AUTO: 29 %
MCH RBC QN AUTO: 26.4 PG (ref 26.6–33)
MCHC RBC AUTO-ENTMCNC: 30.6 G/DL (ref 31.5–35.7)
MCV RBC AUTO: 86 FL (ref 79–97)
METHYLMALONATE SERPL-SCNC: 98 NMOL/L (ref 0–378)
MONOCYTES # BLD AUTO: 0.9 X10E3/UL (ref 0.1–0.9)
MONOCYTES NFR BLD AUTO: 11 %
NEUTROPHILS # BLD AUTO: 4.5 X10E3/UL (ref 1.4–7)
NEUTROPHILS NFR BLD AUTO: 55 %
PLATELET # BLD AUTO: 252 X10E3/UL (ref 150–450)
POTASSIUM SERPL-SCNC: 4.3 MMOL/L (ref 3.5–5.2)
PREALB SERPL-MCNC: 28 MG/DL (ref 12–34)
PROT SERPL-MCNC: 6.5 G/DL (ref 6–8.5)
RBC # BLD AUTO: 5.23 X10E6/UL (ref 3.77–5.28)
SODIUM SERPL-SCNC: 143 MMOL/L (ref 134–144)
VIT B1 BLD-SCNC: 146.3 NMOL/L (ref 66.5–200)
WBC # BLD AUTO: 8.1 X10E3/UL (ref 3.4–10.8)

## 2025-05-05 ENCOUNTER — OFFICE VISIT (OUTPATIENT)
Dept: BARIATRICS/WEIGHT MGMT | Facility: CLINIC | Age: 50
End: 2025-05-05
Payer: COMMERCIAL

## 2025-05-05 VITALS
BODY MASS INDEX: 34.45 KG/M2 | HEIGHT: 64 IN | WEIGHT: 201.8 LBS | SYSTOLIC BLOOD PRESSURE: 120 MMHG | DIASTOLIC BLOOD PRESSURE: 80 MMHG | HEART RATE: 89 BPM

## 2025-05-05 DIAGNOSIS — E66.09 CLASS 1 OBESITY DUE TO EXCESS CALORIES WITH SERIOUS COMORBIDITY AND BODY MASS INDEX (BMI) OF 34.0 TO 34.9 IN ADULT: Primary | ICD-10-CM

## 2025-05-05 DIAGNOSIS — Z51.81 ENCOUNTER FOR THERAPEUTIC DRUG LEVEL MONITORING: ICD-10-CM

## 2025-05-05 DIAGNOSIS — I10 HYPERTENSION, UNSPECIFIED TYPE: ICD-10-CM

## 2025-05-05 DIAGNOSIS — E66.811 CLASS 1 OBESITY DUE TO EXCESS CALORIES WITH SERIOUS COMORBIDITY AND BODY MASS INDEX (BMI) OF 34.0 TO 34.9 IN ADULT: Primary | ICD-10-CM

## 2025-05-05 PROCEDURE — 99214 OFFICE O/P EST MOD 30 MIN: CPT | Performed by: NURSE PRACTITIONER

## 2025-05-05 NOTE — ASSESSMENT & PLAN NOTE
Patient's (Body mass index is 34.64 kg/m².) indicates that they are obese (BMI >30) with health conditions that include obstructive sleep apnea, hypertension, diabetes mellitus, dyslipidemias, and GERD . Weight is improving with treatment. BMI  is above average; BMI management plan is completed. We discussed low calorie, low carb based diet program, portion control, increasing exercise, pharmacologic options including phentermine, mounjaro, and an sally-based approach such as Perfect[employer] Pal or Lose It.     I have instructed the patient to continue with pursuit of medical weight loss as a part of this program. Patient does meet criteria for use of anorectics at this time as BMI >30  and is not at treatment goal.     The current plan for this month includes:   - Continue current exercise efforts- increase as tolerated with back pain.   - Continue to prioritize protein, fiber, and hydration.   - cont mounjaro 15mg.   - Reviewed AOM options, needs to lose an additional 40lb for skin removal. Hx topamax: drowsy, dysguesia. Hx phentermine: inefficacious, consider wellbutrin/naltrexone.  Will restart phentermine after UDS review. Discussed common side effects and risks/benefits.   - Treatment goal 160-180lb.

## 2025-05-05 NOTE — PROGRESS NOTES
[facility]  [address]  [place] [address]     Office Note      Date: 2025  Patient Name: Eloise Delatorre  MRN: [id number]  : 1975  Subjective  Subjective     Chief Complaint  Obesity Management follow-up          Eloise Delatorre presents to [facility] for obesity management.   Patient is unsatisfied with weight loss progress. Appetite is unsure. Reports no side effects of prescribed medications today. The patient is taking multivitamin and is taking fish oil.  The patient is not using a food journal.      She is disappointed today.  She went to  plastic Surgery for excess skin removal and they told her that she needs to loose an additional 40 lbs. She is trying to be mindful of her choices and increasing exercise.    24 hour recall:  Breakfast: 2 boiled eggs, 1 slice laughlin, black coffee  Lunch: Tuna pouch, wasa cracker, unsweet tea, dill pickle  Dinner: 6 oz sirloin, small baked potato plain, 1 c broccoli  Snack: 2 dill pickle hilton, cheese stick, cutie orange, medium apple, small banana, 1/2 c raw cauliflower  Water Intake:  oz  Other beverages: black coffee  Alcohol: none    The patient is exercising brisk walking with a FITT score of:    Frequency Intensity Time Strength Training   []   0, none []   0 []   0 []   0   []   1 (1-2x/week) [x]   1 (light) [x]   1 (<10 min) [x]   1 (1x/week)   []   2 (3-5x/week) []   2 (moderate) []   2 (10-20 min) []   2 (2x/week)   [x]   3 (daily) []   3 (moderately hard)  []   4 (very hard) []   3 (20-30 min)  []   4 (>30 min) []   3 (3-4x/week)           Review of Systems   Constitutional:  Negative for appetite change and fatigue.   Eyes:  Negative for visual disturbance.   Cardiovascular:  Negative for chest pain and palpitations.   Gastrointestinal:  Negative for constipation and indigestion.   Neurological:  Negative for light-headedness.         Current Outpatient Medications:      "atorvastatin (LIPITOR) 40 MG tablet, Take 1 tablet by mouth Daily., Disp: , Rfl:     fenofibric acid (TRILIPIX) 135 MG capsule delayed-release delayed release capsule, Take 1 capsule by mouth Daily., Disp: , Rfl:     ferrous sulfate 324 (65 Fe) MG tablet delayed-release EC tablet, Take 1 tablet by mouth 2 (Two) Times a Day With Meals., Disp: , Rfl:     lisinopril (PRINIVIL,ZESTRIL) 10 MG tablet, Take 1 tablet by mouth Daily., Disp: , Rfl:     metFORMIN ER (GLUCOPHAGE-XR) 500 MG 24 hr tablet, TAKE 3 TABLETS BY MOUTH DAILY WITH FOOD., Disp: 270 tablet, Rfl: 0    minocycline (MINOCIN,DYNACIN) 100 MG capsule, Take 1 capsule by mouth 2 (Two) Times a Day., Disp: , Rfl:     Multiple Vitamins-Minerals (CENTRUM ULTRA WOMENS) tablet, Take 1 tablet by mouth Daily. patch, Disp: , Rfl:     omeprazole (priLOSEC) 40 MG capsule, Take 1 capsule by mouth Daily., Disp: 90 capsule, Rfl: 0    Tirzepatide 15 MG/0.5ML solution auto-injector, Inject 15 mg under the skin into the appropriate area as directed 1 (One) Time Per Week., Disp: 6 mL, Rfl: 0    Current Facility-Administered Medications:     cyanocobalamin injection 1,000 mcg, 1,000 mcg, Intramuscular, Q28 Days, Katy Dobson APRN, 1,000 mcg at 03/17/25 0857    Objective   Start weight: 227 pounds.    Total weight loss: -25.2 pounds/-11.10%  Change in weight since last visit: -1lb    Body mass index is 34.64 kg/m².   Body composition analysis completed and showed:   Body Fat %: 43.3    Measurements (in inches)  Waist Circumference: 48      Vital Signs:   /80 (BP Location: Right arm, Patient Position: Sitting)   Pulse 89   Ht 162.6 cm (64\")   Wt 91.5 kg (201 lb 12.8 oz)   BMI 34.64 kg/m²     No LMP recorded. Patient has had a hysterectomy.    Physical Exam   General appears stated age and normal appearance   HEENT PERRLA, EOM intact, and conjunctivae normal   Chest/lungs Normal rate, Regular rhythm, and Breathing is unlabored   Extremities without edema   Neuro Good " historian and No focal deficit   Skin Warm, dry, intact   Psych normal behavior, normal thought content, and normal concentration     Result Review :                Assessment / Plan        Diagnoses and all orders for this visit:    1. Class 1 obesity due to excess calories with serious comorbidity and body mass index (BMI) of 34.0 to 34.9 in adult (Primary)  Assessment & Plan:  Patient's (Body mass index is 34.64 kg/m².) indicates that they are obese (BMI >30) with health conditions that include obstructive sleep apnea, hypertension, diabetes mellitus, dyslipidemias, and GERD . Weight is improving with treatment. BMI  is above average; BMI management plan is completed. We discussed low calorie, low carb based diet program, portion control, increasing exercise, pharmacologic options including phentermine, mounjaro, and an sally-based approach such as [employer] Pal or Lose It.     I have instructed the patient to continue with pursuit of medical weight loss as a part of this program. Patient does meet criteria for use of anorectics at this time as BMI >30  and is not at treatment goal.     The current plan for this month includes:   - Continue current exercise efforts- increase as tolerated with back pain.   - Continue to prioritize protein, fiber, and hydration.   - cont mounjaro 15mg.   - Reviewed AOM options, needs to lose an additional 40lb for skin removal. Hx topamax: drowsy, dysguesia. Hx phentermine: inefficacious, consider wellbutrin/naltrexone.  Will restart phentermine after UDS review. Discussed common side effects and risks/benefits.   - Treatment goal 160-180lb.         2. Hypertension, unspecified type  Overview:  On lisinopril. Stable. Managed by PCP      Assessment & Plan:  Hypertension is stable and controlled  Continue current treatment regimen.  Weight loss.  Regular aerobic exercise.  Ambulatory blood pressure monitoring.  Blood pressure will be reassessed in 1 month.  Hold phentermine for BP  >150/90, advised to be cautious with caffeine and decongestants.       3. Encounter for therapeutic drug level monitoring  -     Urine Drug Screen - Urine, Clean Catch          Follow Up   Return in about 1 month (around 6/5/2025) for Next scheduled follow up.  Patient was given instructions and counseling regarding her condition or for health maintenance advice. Please see specific information pulled into the AVS if appropriate.     Katy Dobson, GIANA  05/05/2025

## 2025-05-05 NOTE — ASSESSMENT & PLAN NOTE
Hypertension is stable and controlled  Continue current treatment regimen.  Weight loss.  Regular aerobic exercise.  Ambulatory blood pressure monitoring.  Blood pressure will be reassessed in 1 month.  Hold phentermine for BP >150/90, advised to be cautious with caffeine and decongestants.

## 2025-05-06 LAB
AMPHETAMINES UR QL SCN: NEGATIVE NG/ML
BARBITURATES UR QL SCN: NEGATIVE NG/ML
BENZODIAZ UR QL SCN: NEGATIVE NG/ML
BZE UR QL SCN: NEGATIVE NG/ML
CANNABINOIDS UR QL SCN: NEGATIVE NG/ML
CREAT UR-MCNC: 56 MG/DL (ref 20–300)
LABORATORY COMMENT REPORT: NORMAL
METHADONE UR QL SCN: NEGATIVE NG/ML
OPIATES UR QL SCN: NEGATIVE NG/ML
OXYCODONE+OXYMORPHONE UR QL SCN: NEGATIVE NG/ML
PCP UR QL: NEGATIVE NG/ML
PH UR: 6.9 [PH] (ref 4.5–8.9)
PROPOXYPH UR QL SCN: NEGATIVE NG/ML

## 2025-05-07 ENCOUNTER — RESULTS FOLLOW-UP (OUTPATIENT)
Dept: BARIATRICS/WEIGHT MGMT | Facility: CLINIC | Age: 50
End: 2025-05-07
Payer: COMMERCIAL

## 2025-05-07 DIAGNOSIS — E66.09 CLASS 1 OBESITY DUE TO EXCESS CALORIES WITH SERIOUS COMORBIDITY AND BODY MASS INDEX (BMI) OF 34.0 TO 34.9 IN ADULT: Primary | ICD-10-CM

## 2025-05-07 DIAGNOSIS — E66.811 CLASS 1 OBESITY DUE TO EXCESS CALORIES WITH SERIOUS COMORBIDITY AND BODY MASS INDEX (BMI) OF 34.0 TO 34.9 IN ADULT: Primary | ICD-10-CM

## 2025-05-12 RX ORDER — PHENTERMINE HYDROCHLORIDE 37.5 MG/1
TABLET ORAL
Qty: 23 TABLET | Refills: 0 | Status: SHIPPED | OUTPATIENT
Start: 2025-05-12

## 2025-05-12 NOTE — TELEPHONE ENCOUNTER
Start phentermine.   After review of Ms. Eloise Delatorre lab work, TEO, urine drug screen, PMH, and medical risk factors, it has been decided that it is medically appropriate to use an anorectic medication for assistance of weight loss. It is felt that the risks of staying at current body fat percentage and potential adverse co-morbid conditions outweighs the risk of medication use. Medication risks and benefits were again reviewed with patient. she has signed the medication agreement form & has decided to try the use of an anorectic medication for the assistance of weight loss.

## 2025-06-10 DIAGNOSIS — E66.09 CLASS 1 OBESITY DUE TO EXCESS CALORIES WITH SERIOUS COMORBIDITY AND BODY MASS INDEX (BMI) OF 34.0 TO 34.9 IN ADULT: ICD-10-CM

## 2025-06-10 DIAGNOSIS — E11.9 TYPE 2 DIABETES MELLITUS WITHOUT COMPLICATION, WITHOUT LONG-TERM CURRENT USE OF INSULIN: ICD-10-CM

## 2025-06-10 DIAGNOSIS — E66.811 CLASS 1 OBESITY DUE TO EXCESS CALORIES WITH SERIOUS COMORBIDITY AND BODY MASS INDEX (BMI) OF 34.0 TO 34.9 IN ADULT: ICD-10-CM

## 2025-06-11 RX ORDER — PHENTERMINE HYDROCHLORIDE 37.5 MG/1
TABLET ORAL
Qty: 30 TABLET | Refills: 1 | Status: SHIPPED | OUTPATIENT
Start: 2025-06-11

## 2025-08-25 ENCOUNTER — OFFICE VISIT (OUTPATIENT)
Dept: BARIATRICS/WEIGHT MGMT | Facility: CLINIC | Age: 50
End: 2025-08-25
Payer: MEDICARE

## 2025-08-25 VITALS
WEIGHT: 186.4 LBS | HEART RATE: 82 BPM | DIASTOLIC BLOOD PRESSURE: 74 MMHG | HEIGHT: 64 IN | SYSTOLIC BLOOD PRESSURE: 104 MMHG | BODY MASS INDEX: 31.82 KG/M2

## 2025-08-25 DIAGNOSIS — E66.09 CLASS 1 OBESITY DUE TO EXCESS CALORIES WITH SERIOUS COMORBIDITY AND BODY MASS INDEX (BMI) OF 32.0 TO 32.9 IN ADULT: Primary | ICD-10-CM

## 2025-08-25 DIAGNOSIS — E11.9 TYPE 2 DIABETES MELLITUS WITHOUT COMPLICATION, WITHOUT LONG-TERM CURRENT USE OF INSULIN: ICD-10-CM

## 2025-08-25 DIAGNOSIS — E66.811 CLASS 1 OBESITY DUE TO EXCESS CALORIES WITH SERIOUS COMORBIDITY AND BODY MASS INDEX (BMI) OF 32.0 TO 32.9 IN ADULT: Primary | ICD-10-CM

## 2025-08-25 PROCEDURE — 1159F MED LIST DOCD IN RCRD: CPT | Performed by: NURSE PRACTITIONER

## 2025-08-25 PROCEDURE — 99214 OFFICE O/P EST MOD 30 MIN: CPT | Performed by: NURSE PRACTITIONER

## 2025-08-25 PROCEDURE — 3078F DIAST BP <80 MM HG: CPT | Performed by: NURSE PRACTITIONER

## 2025-08-25 PROCEDURE — 96372 THER/PROPH/DIAG INJ SC/IM: CPT | Performed by: NURSE PRACTITIONER

## 2025-08-25 PROCEDURE — 1160F RVW MEDS BY RX/DR IN RCRD: CPT | Performed by: NURSE PRACTITIONER

## 2025-08-25 PROCEDURE — 3074F SYST BP LT 130 MM HG: CPT | Performed by: NURSE PRACTITIONER

## 2025-08-25 RX ORDER — PHENTERMINE HYDROCHLORIDE 37.5 MG/1
TABLET ORAL
Qty: 90 TABLET | Refills: 0 | Status: SHIPPED | OUTPATIENT
Start: 2025-08-25

## 2025-08-25 RX ORDER — METFORMIN HYDROCHLORIDE 500 MG/1
TABLET, EXTENDED RELEASE ORAL
Qty: 270 TABLET | Refills: 0 | Status: SHIPPED | OUTPATIENT
Start: 2025-08-25

## 2025-08-25 RX ADMIN — CYANOCOBALAMIN 1000 MCG: 1000 INJECTION, SOLUTION INTRAMUSCULAR; SUBCUTANEOUS at 10:43

## (undated) DEVICE — GLV SURG DERMASSURE GRN LF PF 7.0

## (undated) DEVICE — ENDOPATH XCEL BLADELESS TROCARS WITH STABILITY SLEEVES: Brand: ENDOPATH XCEL

## (undated) DEVICE — SKIN AFFIX SURG ADHESIVE 72/CS 0.55ML: Brand: MEDLINE

## (undated) DEVICE — FLTR PLUMEPORT LAP W/CONN STRL

## (undated) DEVICE — ANTIBACTERIAL VIOLET BRAIDED (POLYGLACTIN 910), SYNTHETIC ABSORBABLE SUTURE: Brand: COATED VICRYL

## (undated) DEVICE — CONTN GRAD MEAS TRIANG 32OZ BLK

## (undated) DEVICE — GOWN,NON-REINFORCED,SIRUS,SET IN SLV,XXL: Brand: MEDLINE

## (undated) DEVICE — INTENDED USE FOR SURGICAL MARKING ON INTACT SKIN, ALSO PROVIDES A PERMANENT METHOD OF IDENTIFYING OBJECTS IN THE OPERATING ROOM: Brand: WRITESITE® REGULAR TIP SKIN MARKER

## (undated) DEVICE — GLV SURG SENSICARE MICRO PF LF 6.5 STRL

## (undated) DEVICE — DRN PENRS 1/2X18IN LTX

## (undated) DEVICE — Device: Brand: DEFENDO AIR/WATER/SUCTION AND BIOPSY VALVE

## (undated) DEVICE — TROCAR: Brand: KII FIOS FIRST ENTRY

## (undated) DEVICE — DRSNG TELFA PAD NONADH STR 1S 3X8IN

## (undated) DEVICE — CLMP STD 22CM DISP

## (undated) DEVICE — Device

## (undated) DEVICE — MAX-CORE® DISPOSABLE CORE BIOPSY INSTRUMENT, 18G X 20CM: Brand: MAX-CORE

## (undated) DEVICE — ENDOPATH 5MM ENDOSCOPIC BLUNT TIP DISSECTORS (12 POUCHES CONTAINING 3 DISSECTORS EACH): Brand: ENDOPATH

## (undated) DEVICE — COVER,LIGHT HANDLE,FLX,1/PK: Brand: MEDLINE INDUSTRIES, INC.

## (undated) DEVICE — SUT SILK 0 SH 30IN K834H

## (undated) DEVICE — [HIGH FLOW INSUFFLATOR,  DO NOT USE IF PACKAGE IS DAMAGED,  KEEP DRY,  KEEP AWAY FROM SUNLIGHT,  PROTECT FROM HEAT AND RADIOACTIVE SOURCES.]: Brand: PNEUMOSURE

## (undated) DEVICE — TISSUE RETRIEVAL SYSTEM: Brand: INZII RETRIEVAL SYSTEM

## (undated) DEVICE — APPL CHLORAPREP W/TINT 26ML BLU

## (undated) DEVICE — UNDRPD COMFRT GLD DRYPAD 36X57IN

## (undated) DEVICE — SHT AIR TRANSFR COMFRT GLIDE LT LAT 40X80IN

## (undated) DEVICE — POWER SHELL: Brand: SIGNIA

## (undated) DEVICE — MARYLAND JAW LAPAROSCOPIC SEALER/DIVIDER COATED: Brand: LIGASURE

## (undated) DEVICE — ENDOPATH XCEL UNIVERSAL TROCAR STABLILITY SLEEVES: Brand: ENDOPATH XCEL

## (undated) DEVICE — PK BARIATRIC 10